# Patient Record
Sex: FEMALE | Race: WHITE | NOT HISPANIC OR LATINO | Employment: UNEMPLOYED | URBAN - METROPOLITAN AREA
[De-identification: names, ages, dates, MRNs, and addresses within clinical notes are randomized per-mention and may not be internally consistent; named-entity substitution may affect disease eponyms.]

---

## 2018-03-05 ENCOUNTER — OFFICE VISIT CONVERTED (OUTPATIENT)
Dept: FAMILY MEDICINE CLINIC | Facility: CLINIC | Age: 68
End: 2018-03-05
Attending: FAMILY MEDICINE

## 2018-03-14 ENCOUNTER — OFFICE VISIT CONVERTED (OUTPATIENT)
Dept: FAMILY MEDICINE CLINIC | Facility: CLINIC | Age: 68
End: 2018-03-14
Attending: FAMILY MEDICINE

## 2018-05-23 ENCOUNTER — OFFICE VISIT CONVERTED (OUTPATIENT)
Dept: NEUROLOGY | Facility: CLINIC | Age: 68
End: 2018-05-23
Attending: PSYCHIATRY & NEUROLOGY

## 2018-05-23 ENCOUNTER — CONVERSION ENCOUNTER (OUTPATIENT)
Dept: NEUROLOGY | Facility: CLINIC | Age: 68
End: 2018-05-23

## 2018-06-07 ENCOUNTER — OFFICE VISIT CONVERTED (OUTPATIENT)
Dept: FAMILY MEDICINE CLINIC | Facility: CLINIC | Age: 68
End: 2018-06-07
Attending: NURSE PRACTITIONER

## 2018-07-06 ENCOUNTER — OFFICE VISIT CONVERTED (OUTPATIENT)
Dept: FAMILY MEDICINE CLINIC | Facility: CLINIC | Age: 68
End: 2018-07-06
Attending: FAMILY MEDICINE

## 2018-09-17 ENCOUNTER — OFFICE VISIT CONVERTED (OUTPATIENT)
Dept: FAMILY MEDICINE CLINIC | Facility: CLINIC | Age: 68
End: 2018-09-17
Attending: FAMILY MEDICINE

## 2018-10-11 ENCOUNTER — CONVERSION ENCOUNTER (OUTPATIENT)
Dept: OTHER | Facility: HOSPITAL | Age: 68
End: 2018-10-11

## 2018-10-19 ENCOUNTER — OFFICE VISIT CONVERTED (OUTPATIENT)
Dept: FAMILY MEDICINE CLINIC | Facility: CLINIC | Age: 68
End: 2018-10-19
Attending: FAMILY MEDICINE

## 2018-10-23 ENCOUNTER — OFFICE VISIT CONVERTED (OUTPATIENT)
Dept: GASTROENTEROLOGY | Facility: CLINIC | Age: 68
End: 2018-10-23
Attending: PHYSICIAN ASSISTANT

## 2018-12-14 ENCOUNTER — OFFICE VISIT CONVERTED (OUTPATIENT)
Dept: FAMILY MEDICINE CLINIC | Facility: CLINIC | Age: 68
End: 2018-12-14
Attending: FAMILY MEDICINE

## 2019-01-29 ENCOUNTER — OFFICE VISIT CONVERTED (OUTPATIENT)
Dept: FAMILY MEDICINE CLINIC | Facility: CLINIC | Age: 69
End: 2019-01-29
Attending: FAMILY MEDICINE

## 2019-01-29 ENCOUNTER — HOSPITAL ENCOUNTER (OUTPATIENT)
Dept: FAMILY MEDICINE CLINIC | Facility: CLINIC | Age: 69
Discharge: HOME | End: 2019-01-29

## 2019-02-02 LAB — CONV DRAINAGE CULTURE: NORMAL

## 2019-04-12 ENCOUNTER — OFFICE VISIT CONVERTED (OUTPATIENT)
Dept: FAMILY MEDICINE CLINIC | Facility: CLINIC | Age: 69
End: 2019-04-12
Attending: FAMILY MEDICINE

## 2019-07-02 ENCOUNTER — OFFICE VISIT CONVERTED (OUTPATIENT)
Dept: FAMILY MEDICINE CLINIC | Facility: CLINIC | Age: 69
End: 2019-07-02
Attending: FAMILY MEDICINE

## 2019-07-30 ENCOUNTER — HOSPITAL ENCOUNTER (OUTPATIENT)
Dept: OTHER | Facility: HOSPITAL | Age: 69
Discharge: HOME | End: 2019-07-30

## 2019-07-30 LAB
ALBUMIN SERPL-MCNC: 4.1 G/DL (ref 3.5–5)
ALBUMIN/GLOB SERPL: 1.5 {RATIO} (ref 1.4–2.6)
ALP SERPL-CCNC: 93 U/L (ref 43–160)
ALT SERPL-CCNC: 10 U/L (ref 10–40)
ANION GAP SERPL CALC-SCNC: 17 MMOL/L (ref 8–19)
AST SERPL-CCNC: 13 U/L (ref 15–50)
BASOPHILS # BLD AUTO: 0.06 10*3/UL (ref 0–0.2)
BASOPHILS NFR BLD AUTO: 0.9 % (ref 0–3)
BILIRUB SERPL-MCNC: 0.27 MG/DL (ref 0.2–1.3)
BUN SERPL-MCNC: 8 MG/DL (ref 5–25)
BUN/CREAT SERPL: 9 {RATIO} (ref 6–20)
CALCIUM SERPL-MCNC: 9.5 MG/DL (ref 8.7–10.4)
CHLORIDE SERPL-SCNC: 96 MMOL/L (ref 99–111)
CHOLEST SERPL-MCNC: 121 MG/DL (ref 107–200)
CHOLEST/HDLC SERPL: 2.4 {RATIO} (ref 3–6)
CONV ABS IMM GRAN: 0.02 10*3/UL (ref 0–0.2)
CONV CO2: 28 MMOL/L (ref 22–32)
CONV IMMATURE GRAN: 0.3 % (ref 0–1.8)
CONV TOTAL PROTEIN: 6.9 G/DL (ref 6.3–8.2)
CREAT UR-MCNC: 0.91 MG/DL (ref 0.5–0.9)
DEPRECATED RDW RBC AUTO: 45.6 FL (ref 36.4–46.3)
EOSINOPHIL # BLD AUTO: 0.19 10*3/UL (ref 0–0.7)
EOSINOPHIL # BLD AUTO: 2.9 % (ref 0–7)
ERYTHROCYTE [DISTWIDTH] IN BLOOD BY AUTOMATED COUNT: 12.6 % (ref 11.7–14.4)
GFR SERPLBLD BASED ON 1.73 SQ M-ARVRAT: >60 ML/MIN/{1.73_M2}
GLOBULIN UR ELPH-MCNC: 2.8 G/DL (ref 2–3.5)
GLUCOSE SERPL-MCNC: 79 MG/DL (ref 65–99)
HBA1C MFR BLD: 14.1 G/DL (ref 12–16)
HCT VFR BLD AUTO: 46 % (ref 37–47)
HDLC SERPL-MCNC: 50 MG/DL (ref 40–60)
LDLC SERPL CALC-MCNC: 51 MG/DL (ref 70–100)
LYMPHOCYTES # BLD AUTO: 2.14 10*3/UL (ref 1–5)
MCH RBC QN AUTO: 29.9 PG (ref 27–31)
MCHC RBC AUTO-ENTMCNC: 30.7 G/DL (ref 33–37)
MCV RBC AUTO: 97.7 FL (ref 81–99)
MONOCYTES # BLD AUTO: 0.65 10*3/UL (ref 0.2–1.2)
MONOCYTES NFR BLD AUTO: 9.8 % (ref 3–10)
NEUTROPHILS # BLD AUTO: 3.6 10*3/UL (ref 2–8)
NEUTROPHILS NFR BLD AUTO: 54 % (ref 30–85)
NRBC CBCN: 0 % (ref 0–0.7)
OSMOLALITY SERPL CALC.SUM OF ELEC: 281 MOSM/KG (ref 273–304)
PLATELET # BLD AUTO: 154 10*3/UL (ref 130–400)
PMV BLD AUTO: 9.8 FL (ref 9.4–12.3)
POTASSIUM SERPL-SCNC: 4.2 MMOL/L (ref 3.5–5.3)
RBC # BLD AUTO: 4.71 10*6/UL (ref 4.2–5.4)
SODIUM SERPL-SCNC: 137 MMOL/L (ref 135–147)
TRIGL SERPL-MCNC: 100 MG/DL (ref 40–150)
VARIANT LYMPHS NFR BLD MANUAL: 32.1 % (ref 20–45)
VLDLC SERPL-MCNC: 20 MG/DL (ref 5–37)
WBC # BLD AUTO: 6.66 10*3/UL (ref 4.8–10.8)

## 2019-09-23 ENCOUNTER — OFFICE VISIT CONVERTED (OUTPATIENT)
Dept: FAMILY MEDICINE CLINIC | Facility: CLINIC | Age: 69
End: 2019-09-23
Attending: FAMILY MEDICINE

## 2019-12-17 ENCOUNTER — OFFICE VISIT CONVERTED (OUTPATIENT)
Dept: FAMILY MEDICINE CLINIC | Facility: CLINIC | Age: 69
End: 2019-12-17
Attending: FAMILY MEDICINE

## 2019-12-17 ENCOUNTER — HOSPITAL ENCOUNTER (OUTPATIENT)
Dept: FAMILY MEDICINE CLINIC | Facility: CLINIC | Age: 69
Discharge: HOME | End: 2019-12-17
Attending: FAMILY MEDICINE

## 2019-12-17 LAB
ALBUMIN SERPL-MCNC: 4.5 G/DL (ref 3.5–5)
ALBUMIN/GLOB SERPL: 1.7 {RATIO} (ref 1.4–2.6)
ALP SERPL-CCNC: 83 U/L (ref 43–160)
ALT SERPL-CCNC: 10 U/L (ref 10–40)
ANION GAP SERPL CALC-SCNC: 19 MMOL/L (ref 8–19)
AST SERPL-CCNC: 17 U/L (ref 15–50)
BASOPHILS # BLD AUTO: 0.06 10*3/UL (ref 0–0.2)
BASOPHILS # BLD: 1 % (ref 0–3)
BASOPHILS NFR BLD AUTO: 0.7 % (ref 0–3)
BILIRUB SERPL-MCNC: 0.35 MG/DL (ref 0.2–1.3)
BUN SERPL-MCNC: 14 MG/DL (ref 5–25)
BUN/CREAT SERPL: 14 {RATIO} (ref 6–20)
BURR CELLS BLD QL SMEAR: SLIGHT
CALCIUM SERPL-MCNC: 9.7 MG/DL (ref 8.7–10.4)
CHLORIDE SERPL-SCNC: 100 MMOL/L (ref 99–111)
CHOLEST SERPL-MCNC: 163 MG/DL (ref 107–200)
CHOLEST/HDLC SERPL: 3.1 {RATIO} (ref 3–6)
CONV ABS BANDS: 1 % (ref 1–5)
CONV ABS IMM GRAN: 0.03 10*3/UL (ref 0–0.2)
CONV ANISOCYTES: SLIGHT
CONV ATYPICAL LYMPHOCYTES: 2 % (ref 0–5)
CONV CO2: 26 MMOL/L (ref 22–32)
CONV CREATININE URINE, RANDOM: 51.7 MG/DL (ref 10–300)
CONV IMMATURE GRAN: 0.3 % (ref 0–1.8)
CONV MICROALBUM.,U,RANDOM: <12 MG/L (ref 0–20)
CONV SEGMENTED NEUTROPHILS: 71 % (ref 45–70)
CONV TOTAL PROTEIN: 7.2 G/DL (ref 6.3–8.2)
CREAT UR-MCNC: 1.02 MG/DL (ref 0.5–0.9)
CRP SERPL-MCNC: 1.7 MG/L (ref 0–5)
DACRYOCYTES BLD QL SMEAR: SLIGHT
DEPRECATED RDW RBC AUTO: 51.5 FL (ref 36.4–46.3)
EOSINOPHIL # BLD AUTO: 0.16 10*3/UL (ref 0–0.7)
EOSINOPHIL # BLD AUTO: 1.8 % (ref 0–7)
EOSINOPHIL NFR BLD AUTO: 1 % (ref 0–7)
ERYTHROCYTE [DISTWIDTH] IN BLOOD BY AUTOMATED COUNT: 13.7 % (ref 11.7–14.4)
ERYTHROCYTE [SEDIMENTATION RATE] IN BLOOD: 5 MM/H (ref 0–30)
EST. AVERAGE GLUCOSE BLD GHB EST-MCNC: 94 MG/DL
GFR SERPLBLD BASED ON 1.73 SQ M-ARVRAT: 56 ML/MIN/{1.73_M2}
GLOBULIN UR ELPH-MCNC: 2.7 G/DL (ref 2–3.5)
GLUCOSE SERPL-MCNC: 88 MG/DL (ref 65–99)
HBA1C MFR BLD: 4.9 % (ref 3.5–5.7)
HCT VFR BLD AUTO: 52.6 % (ref 37–47)
HDLC SERPL-MCNC: 53 MG/DL (ref 40–60)
HGB BLD-MCNC: 16.1 G/DL (ref 12–16)
LDLC SERPL CALC-MCNC: 85 MG/DL (ref 70–100)
LYMPHOCYTES # BLD AUTO: 2.69 10*3/UL (ref 1–5)
LYMPHOCYTES NFR BLD AUTO: 30.8 % (ref 20–45)
MCH RBC QN AUTO: 30.7 PG (ref 27–31)
MCHC RBC AUTO-ENTMCNC: 30.6 G/DL (ref 33–37)
MCV RBC AUTO: 100.2 FL (ref 81–99)
MICROALBUMIN/CREAT UR: 23.2 MG/G{CRE} (ref 0–35)
MONOCYTES # BLD AUTO: 0.63 10*3/UL (ref 0.2–1.2)
MONOCYTES NFR BLD AUTO: 7.2 % (ref 3–10)
MONOCYTES NFR BLD MANUAL: 7 % (ref 3–10)
NEUTROPHILS # BLD AUTO: 5.16 10*3/UL (ref 2–8)
NEUTROPHILS NFR BLD AUTO: 59.2 % (ref 30–85)
NRBC CBCN: 0 % (ref 0–0.7)
NUC CELL # PRT MANUAL: 0 /100{WBCS}
OSMOLALITY SERPL CALC.SUM OF ELEC: 292 MOSM/KG (ref 273–304)
PLAT MORPH BLD: NORMAL
PLATELET # BLD AUTO: 150 10*3/UL (ref 130–400)
PMV BLD AUTO: 9.9 FL (ref 9.4–12.3)
POIKILOCYTOSIS BLD QL SMEAR: SLIGHT
POTASSIUM SERPL-SCNC: 4.4 MMOL/L (ref 3.5–5.3)
RBC # BLD AUTO: 5.25 10*6/UL (ref 4.2–5.4)
ROULEAUX BLD QL SMEAR: SLIGHT
SMALL PLATELETS BLD QL SMEAR: ADEQUATE
SODIUM SERPL-SCNC: 141 MMOL/L (ref 135–147)
SPHEROCYTES BLD QL SMEAR: SLIGHT
T4 FREE SERPL-MCNC: 1.4 NG/DL (ref 0.9–1.8)
TRIGL SERPL-MCNC: 126 MG/DL (ref 40–150)
TSH SERPL-ACNC: 1.37 M[IU]/L (ref 0.27–4.2)
VARIANT LYMPHS NFR BLD MANUAL: 17 % (ref 20–45)
VLDLC SERPL-MCNC: 25 MG/DL (ref 5–37)
WBC # BLD AUTO: 8.73 10*3/UL (ref 4.8–10.8)

## 2019-12-18 LAB — HCV AB SER DONR QL: <0.1 S/CO RATIO (ref 0–0.9)

## 2020-01-08 ENCOUNTER — HOSPITAL ENCOUNTER (OUTPATIENT)
Dept: CARDIOLOGY | Facility: HOSPITAL | Age: 70
Discharge: HOME | End: 2020-01-08
Attending: FAMILY MEDICINE

## 2020-01-10 ENCOUNTER — CONVERSION ENCOUNTER (OUTPATIENT)
Dept: FAMILY MEDICINE CLINIC | Facility: CLINIC | Age: 70
End: 2020-01-10

## 2020-01-10 ENCOUNTER — OFFICE VISIT CONVERTED (OUTPATIENT)
Dept: FAMILY MEDICINE CLINIC | Facility: CLINIC | Age: 70
End: 2020-01-10
Attending: FAMILY MEDICINE

## 2020-04-27 ENCOUNTER — TELEPHONE CONVERTED (OUTPATIENT)
Dept: FAMILY MEDICINE CLINIC | Facility: CLINIC | Age: 70
End: 2020-04-27
Attending: FAMILY MEDICINE

## 2020-11-10 ENCOUNTER — TELEPHONE CONVERTED (OUTPATIENT)
Dept: FAMILY MEDICINE CLINIC | Facility: CLINIC | Age: 70
End: 2020-11-10
Attending: FAMILY MEDICINE

## 2021-01-13 ENCOUNTER — HOSPITAL ENCOUNTER (OUTPATIENT)
Dept: FAMILY MEDICINE CLINIC | Facility: CLINIC | Age: 71
Discharge: HOME | End: 2021-01-13
Attending: FAMILY MEDICINE

## 2021-01-13 ENCOUNTER — OFFICE VISIT CONVERTED (OUTPATIENT)
Dept: FAMILY MEDICINE CLINIC | Facility: CLINIC | Age: 71
End: 2021-01-13
Attending: FAMILY MEDICINE

## 2021-01-13 LAB
ALBUMIN SERPL-MCNC: 4.3 G/DL (ref 3.5–5)
ALBUMIN/GLOB SERPL: 1.3 {RATIO} (ref 1.4–2.6)
ALP SERPL-CCNC: 92 U/L (ref 43–160)
ALT SERPL-CCNC: 7 U/L (ref 10–40)
ANION GAP SERPL CALC-SCNC: 15 MMOL/L (ref 8–19)
AST SERPL-CCNC: 15 U/L (ref 15–50)
BASOPHILS # BLD AUTO: 0.05 10*3/UL (ref 0–0.2)
BASOPHILS # BLD: 1 % (ref 0–3)
BASOPHILS NFR BLD AUTO: 0.6 % (ref 0–3)
BILIRUB SERPL-MCNC: 0.28 MG/DL (ref 0.2–1.3)
BUN SERPL-MCNC: 15 MG/DL (ref 5–25)
BUN/CREAT SERPL: 13 {RATIO} (ref 6–20)
BURR CELLS BLD QL SMEAR: SLIGHT
CALCIUM SERPL-MCNC: 10.2 MG/DL (ref 8.7–10.4)
CHLORIDE SERPL-SCNC: 102 MMOL/L (ref 99–111)
CHOLEST SERPL-MCNC: 177 MG/DL (ref 107–200)
CHOLEST/HDLC SERPL: 3.6 {RATIO} (ref 3–6)
CLUMPED PLATELETS: ABNORMAL
CONV ABS BANDS: 0 % (ref 1–5)
CONV ABS IMM GRAN: 0.04 10*3/UL (ref 0–0.2)
CONV ANISOCYTES: ABNORMAL
CONV ATYPICAL LYMPHOCYTES: 6 % (ref 0–5)
CONV CO2: 29 MMOL/L (ref 22–32)
CONV IMMATURE GRAN: 0.5 % (ref 0–1.8)
CONV SEGMENTED NEUTROPHILS: 58 % (ref 45–70)
CONV TOTAL PROTEIN: 7.6 G/DL (ref 6.3–8.2)
CREAT UR-MCNC: 1.17 MG/DL (ref 0.5–0.9)
DACRYOCYTES BLD QL SMEAR: SLIGHT
DEPRECATED RDW RBC AUTO: 51 FL (ref 36.4–46.3)
EOSINOPHIL # BLD AUTO: 0.17 10*3/UL (ref 0–0.7)
EOSINOPHIL # BLD AUTO: 2 % (ref 0–7)
EOSINOPHIL NFR BLD AUTO: 3 % (ref 0–7)
ERYTHROCYTE [DISTWIDTH] IN BLOOD BY AUTOMATED COUNT: 14.1 % (ref 11.7–14.4)
EST. AVERAGE GLUCOSE BLD GHB EST-MCNC: 108 MG/DL
GFR SERPLBLD BASED ON 1.73 SQ M-ARVRAT: 47 ML/MIN/{1.73_M2}
GIANT PLATELETS: ABNORMAL
GLOBULIN UR ELPH-MCNC: 3.3 G/DL (ref 2–3.5)
GLUCOSE SERPL-MCNC: 80 MG/DL (ref 65–99)
HBA1C MFR BLD: 5.4 % (ref 3.5–5.7)
HCT VFR BLD AUTO: 52.3 % (ref 37–47)
HDLC SERPL-MCNC: 49 MG/DL (ref 40–60)
HGB BLD-MCNC: 16.8 G/DL (ref 12–16)
HYPOGRANULAR PLATELETS: SLIGHT
LARGE PLATELETS: SLIGHT
LDLC SERPL CALC-MCNC: 100 MG/DL (ref 70–100)
LYMPHOCYTES # BLD AUTO: 2.16 10*3/UL (ref 1–5)
LYMPHOCYTES NFR BLD AUTO: 25.5 % (ref 20–45)
MACROCYTES BLD QL SMEAR: ABNORMAL
MCH RBC QN AUTO: 31.3 PG (ref 27–31)
MCHC RBC AUTO-ENTMCNC: 32.1 G/DL (ref 33–37)
MCV RBC AUTO: 97.4 FL (ref 81–99)
MONOCYTES # BLD AUTO: 0.67 10*3/UL (ref 0.2–1.2)
MONOCYTES NFR BLD AUTO: 7.9 % (ref 3–10)
MONOCYTES NFR BLD MANUAL: 10 % (ref 3–10)
NEUTROPHILS # BLD AUTO: 5.37 10*3/UL (ref 2–8)
NEUTROPHILS NFR BLD AUTO: 63.5 % (ref 30–85)
NRBC CBCN: 0 % (ref 0–0.7)
NUC CELL # PRT MANUAL: 0 /100{WBCS}
OSMOLALITY SERPL CALC.SUM OF ELEC: 292 MOSM/KG (ref 273–304)
OVALOCYTES BLD QL SMEAR: SLIGHT
PLASMA CELL PREC NFR BLD MANUAL: 1 %
PLAT MORPH BLD: NORMAL
PLATELET # BLD AUTO: 150 10*3/UL (ref 130–400)
PMV BLD AUTO: 10 FL (ref 9.4–12.3)
POIKILOCYTOSIS BLD QL SMEAR: SLIGHT
POTASSIUM SERPL-SCNC: 5 MMOL/L (ref 3.5–5.3)
RBC # BLD AUTO: 5.37 10*6/UL (ref 4.2–5.4)
SMALL PLATELETS BLD QL SMEAR: ADEQUATE
SODIUM SERPL-SCNC: 141 MMOL/L (ref 135–147)
T4 FREE SERPL-MCNC: 1.3 NG/DL (ref 0.9–1.8)
TRIGL SERPL-MCNC: 140 MG/DL (ref 40–150)
TSH SERPL-ACNC: 2.04 M[IU]/L (ref 0.27–4.2)
VARIANT LYMPHS NFR BLD MANUAL: 21 % (ref 20–45)
VLDLC SERPL-MCNC: 28 MG/DL (ref 5–37)
WBC # BLD AUTO: 8.46 10*3/UL (ref 4.8–10.8)

## 2021-05-07 NOTE — PROGRESS NOTES
Progress Note      Patient Name: Bessy Leggett   Patient ID: [id number]   Sex: Female   YOB: 1950    Primary Care Provider: Traci Jensen MD   Referring Provider: Traci Jensen MD    Visit Date: November 10, 2020    Provider: Erich Joya DO   Location: [facility]   Location Address: [address] [place] [address] [place]  [address]   Location Phone: [phone]          Chief Complaint     RX REFILLS       History Of Present Illness  TELEHEALTH TELEPHONE VISIT  Chief Complaint: RX REFILLS   Bessy Leggett is a 70 year old /White female who is presenting for evaluation via telehealth telephone visit conducted from the [facility] in [place], [place]. Verbal consent obtained before beginning visit. No other person present with the patient during the visit.   Provider spent 5:10 minutes with patient during telehealth visit.   The following staff were present during this visit: Erich Joya DO   Past Medical History/Overview of Patient Symptoms     *The patient presents with a past medical history significant for NIDDM, COPD and seizures. She is requesting refills of all of her medications. She presents with no significant complaints today. She is due for labs.*       Past Medical History  Disease Name Date Onset Notes   Arthritis --  --    Asthma --  --    Cataract --  --    COPD (chronic obstructive pulmonary disease) --  --    Hyperlipidemia --  --    Hypertension --  --    IBS (irritable bowel syndrome) --  --    Non-insulin dependent type 2 diabetes mellitus --  --    Reflux --  --    Rheumatoid arthritis --  --    Seizure --  --          Past Surgical History  Procedure Name Date Notes   Colonoscopy and EGD, with anesthesia 11-02-18 --    Gallbladder --  --    Hysterectomy --  --          Medication List  Name Date Started Instructions   amlodipine 10 mg oral tablet 07/20/2020 TAKE 1 TABLET BY MOUTH EVERY DAY   Anoro Ellipta 62.5-25  mcg/actuation inhalation blister with device 06/07/2020 inhale 1 puff by inhalation route once daily at the same time each day for 30 days   Flovent HFA 44 mcg/actuation inhalation HFA aerosol inhaler 09/28/2020 INHALE 2 PUFFS(88 MCG) BY MOUTH TWICE DAILY   hydroxyzine pamoate 50 mg oral capsule 09/28/2020 TAKE 1 CAPSULE BY MOUTH TWICE DAILY AS NEEDED FOR ANXIETY   Keppra 500 mg oral tablet 04/27/2020 take 1 tablet (500 mg) by oral route 2 times per day for 30 days   levalbuterol HCl 0.31 mg/3 mL inhalation solution for nebulization 12/17/2019 use in nebulizer as directed 3 times a day for 30 days   lisinopril 20 mg oral tablet 08/11/2020 TAKE 1 TABLET BY MOUTH ONCE DAILY   mirtazapine 30 mg oral tablet 09/28/2020 TAKE 1 TABLET BY MOUTH EVERY DAY AT BEDTIME   omeprazole 20 mg oral capsule,delayed release(DR/EC) 07/27/2020 TAKE 1 CAPSULE BY MOUTH EVERY DAY   pravastatin 40 mg oral tablet 07/20/2020 TAKE 1 TABLET BY MOUTH AT BEDTIME   Remeron 30 mg oral tablet 04/27/2020 take 1 tablet (30 mg) by oral route once daily before bedtime for 30 days   Ventolin HFA 90 mcg/actuation inhalation HFA aerosol inhaler 04/27/2020 INHALE 1 TO 2 PUFFS BY MOUTH AND INTO THE LUNGS EVERY 4 TO 6 HOURS IF NEEDED         Allergy List  Allergen Name Date Reaction Notes   BuSpar --  --  --    Celexa --  --  --    Darvocet-N 100 --  --  --    Elavil --  --  --    ibuprofen --  --  --    Imitrex --  --  --    Paxil --  --  --    SULFA (SULFONAMIDES) --  --  --    tramadol --  --  --    Tylenol-Codeine #3 --  --  --    Vioxx --  --  --    Zoloft --  --  --          Family Medical History  Disease Name Relative/Age Notes   Stroke  --    Heart Disease  --    Hypertension  --    Cancer, Unspecified  --    Diabetes  --          Social History  Finding Status Start/Stop Quantity Notes   Alcohol Never --/-- --  03/14/2018 -    Tobacco Former --/-- --  FORMER SMOKER         Immunizations  NameDate Admin Mfg Trade Name Lot Number Route Inj VIS Given  VIS Publication   Fkaxsirxu13/17/2019 [facility] FLUZONE-HIGH DOSE [id number] IM LD 12/17/2019    Comments: [id number]         Review of Systems  · Constitutional  o Denies  o : fatigue, fever  · Eyes  o Denies  o : discharge from eye, redness  · HENT  o Denies  o : headaches, congestion  · Cardiovascular  o Denies  o : chest pain, palpitations  · Respiratory  o Denies  o : shortness of breath, wheezing, cough  · Gastrointestinal  o Denies  o : vomiting, diarrhea, constipation  · Genitourinary  o Denies  o : dysuria, hematuria  · Integument  o Denies  o : rash, new skin lesions  · Neurologic  o Denies  o : altered mental status, seizures  · Musculoskeletal  o Denies  o : weakness, joint swelling  · Psychiatric  o Denies  o : anxiety, depression  · Heme-Lymph  o Denies  o : lymph node enlargement, tenderness          Assessment  · COPD (chronic obstructive pulmonary disease)     496/J44.9    A.) Rx refilled as noted.     · Seizure disorder     345.90/G40.909    A.) Rx refilled as noted.        *Chronic rx refilled as noted. The patient has been advised that she will need to follow up in office in 3 months, as she will be well overdue for labs at that time.*       Plan  · Orders  o ACO-39: Current medications updated and reviewed (1159F, , 1159F, ) - - 11/10/2020  o Physican Telephone evaluation, 5-10 min (98837) - 496/J44.9, 345.90/G40.909 - 11/10/2020  · Medications  o levalbuterol HCl 0.31 mg/3 mL inhalation solution for nebulization   SIG: Use 3 ml via nebulizer up to three times a day PRN   DISP: (10) Milliliter with 2 refills  Adjusted on 11/10/2020     o omeprazole 20 mg oral capsule,delayed release(DR/EC)   SIG: TAKE 1 CAPSULE BY MOUTH EVERY DAY PRN   DISP: (90) Capsule with 0 refills  Adjusted on 11/10/2020     o amlodipine 10 mg oral tablet   SIG: TAKE 1 TABLET BY MOUTH EVERY DAY   DISP: (90) Tablet with 0 refills  Refilled on 11/10/2020     o Anoro Ellipta 62.5-25 mcg/actuation inhalation blister with  device   SIG: inhale 1 puff by inhalation route once daily at the same time each day for 30 days   DISP: (60) Blister with 2 refills  Refilled on 11/10/2020     o Flovent HFA 44 mcg/actuation inhalation HFA aerosol inhaler   SIG: INHALE 2 PUFFS(88 MCG) BY MOUTH TWICE DAILY   DISP: (10.6) Gram with 1 refills  Refilled on 11/10/2020     o hydroxyzine pamoate 50 mg oral capsule   SIG: TAKE 1 CAPSULE BY MOUTH TWICE DAILY AS NEEDED FOR ANXIETY for 90 days   DISP: (180) Capsule with 0 refills  Refilled on 11/10/2020     o Keppra 500 mg oral tablet   SIG: take 1 tablet (500 mg) by oral route 2 times per day for 30 days   DISP: (60) Tablet with 2 refills  Refilled on 11/10/2020     o lisinopril 20 mg oral tablet   SIG: TAKE 1 TABLET BY MOUTH ONCE DAILY   DISP: (90) Tablet with 0 refills  Refilled on 11/10/2020     o mirtazapine 30 mg oral tablet   SIG: TAKE 1 TABLET BY MOUTH EVERY DAY AT BEDTIME   DISP: (30) Tablet with 2 refills  Refilled on 11/10/2020     o pravastatin 40 mg oral tablet   SIG: TAKE 1 TABLET BY MOUTH AT BEDTIME   DISP: (90) Tablet with 0 refills  Refilled on 11/10/2020     o Remeron 30 mg oral tablet   SIG: take 1 tablet (30 mg) by oral route once daily before bedtime for 30 days   DISP: (30) Tablet with 2 refills  Refilled on 11/10/2020     o Ventolin HFA 90 mcg/actuation inhalation HFA aerosol inhaler   SIG: INHALE 1 TO 2 PUFFS BY MOUTH AND INTO THE LUNGS EVERY 4 TO 6 HOURS IF NEEDED   DISP: (18) Gram with 2 refills  Refilled on 11/10/2020     · Instructions  o Plan Of Care: See assessment section.  o Take all medications as prescribed/directed.  o FOLLOW UP IN OFFICE IN 3 MONTHS.            Electronically Signed by: Erich Joya DO - on November 10, 2020 02:26:28 PM

## 2021-05-07 NOTE — PROGRESS NOTES
Progress Note      Patient Name: Bessy Leggett   Patient ID: [id number]   Sex: Female   YOB: 1950    Primary Care Provider: Traci Jensen MD   Referring Provider: Traci Jensen MD    Visit Date: July 2, 2019    Provider: Traci Jensen MD   Location: [facility]   Location Address: [address] [place] [address] [place]  [id number]   Location Phone: [phone]          Chief Complaint     has been hurting all over, medication check up.       History Of Present Illness  Bessy Leggett is a 69 year old /White female who presents for evaluation and treatment of:      She had her surgery on the abdominal aortic aneurysm and she is still sore from it.  She has beenhurting in her back and neck and arms.  She has been taking tylenol .  She had to have surgery on the RLE because of a clot after surgery .  She has fallen and hit the door frame.  She has nearly fallen several times.  She says the Xanax isn't working anymore.  She has been saving 2 for bedtime and she still isn't sleeping.  She feels like she is suffocating and has hot spells. She is anxious over her daughter whose cancer has returned.  She reiterates that the neurologist said she could never wean off the Xanax and they ask if there is something stronger.  She only has tylenol and aspirin for pain.  She has had a bleed with ASA in the past  Sometimes she has constipation.  That is only if she takes a certain medication  She has continued to have a spell in which she threw up.  She thinks it is because she had eaten and she didn't feel like eating.  Sometimes it feels like there is something sharp when her bowels move.       Past Medical History  Disease Name Date Onset Notes   Arthritis --  --    Asthma --  --    Cataract --  --    COPD (chronic obstructive pulmonary disease) --  --    Diabetes --  --    Hyperlipidemia --  --    Hypertension --  --    IBS (irritable bowel syndrome) --  --    Reflux --   --    Rheumatoid arthritis --  --    Seizure --  --          Past Surgical History  Procedure Name Date Notes   Gallbladder --  --    Hysterectomy --  --          Medication List  Name Date Started Instructions   amlodipine 10 mg oral tablet 01/15/2019 take 1 tablet by mouth once daily   Anoro Ellipta 62.5-25 mcg/actuation inhalation blister with device 06/14/2019 inhale 1 puff by inhalation route once daily at the same time each day for 30 days   hydrocodone-acetaminophen 5-325 mg oral tablet 06/14/2019 take 1 tablet by oral route daily for 10 days prn   Keppra 500 mg oral tablet 06/14/2019 take 1 tablet (500 mg) by oral route 2 times per day for 30 days   levalbuterol HCl 0.31 mg/3 mL inhalation solution for nebulization 12/12/2017 use in nebulizer as directed 3 times a day for 30 days   lisinopril 20 mg oral tablet 06/14/2019 take 1 tablet (20 mg) by oral route once daily for 30 days   omeprazole 20 mg oral capsule,delayed release(/EC) 06/14/2019 take 1 capsule by mouth once daily   pravastatin 40 mg oral tablet 06/14/2019 take 1 tablet by mouth at bedtime   Ventolin HFA 90 mcg/actuation inhalation HFA aerosol inhaler 06/14/2019 inhale 1 - 2 puffs (90 - 180 mcg) by inhalation route every 4-6 hours as needed for 30 days   Vistaril 25 mg oral capsule 06/14/2019 take 1 capsule (25 mg) by oral route 3 times per day for 30 days   Xanax 0.5 mg oral tablet 06/14/2019 take 1 tablet (0.5 mg) by oral route 3 times per day for 30 days         Allergy List  Allergen Name Date Reaction Notes   BuSpar --  --  --    Celexa --  --  --    Darvocet-N 100 --  --  --    Elavil --  --  --    ibuprofen --  --  --    Imitrex --  --  --    Paxil --  --  --    SULFA (SULFONAMIDES) --  --  --    tramadol --  --  --    Tylenol-Codeine #3 --  --  --    Vioxx --  --  --    Zoloft --  --  --          Family Medical History  Disease Name Relative/Age Notes   Stroke  --    Heart Disease  --    Hypertension  --    Cancer, Unspecified  --     Diabetes  --          Social History  Finding Status Start/Stop Quantity Notes   Alcohol Never --/-- --  03/14/2018 -    Former smoker --  --/-- --  03/14/2018 -    Tobacco Former --/-- --  FORMER SMOKER         Immunizations  NameDate Admin Mfg Trade Name Lot Number Route Inj VIS Given VIS Publication   Ibetqynsr03/17/2018 [facility] FLUZONE-HIGH DOSE [id number]  09/17/2018 08/07/2015   Comments: ndc [id number]         Vitals  Date Time BP Position Site L\R Cuff Size HR RR TEMP (F) WT  HT  BMI kg/m2 BSA m2 O2 Sat HC       07/02/2019 01:52 /84 Sitting    114 - R  98.8 105lbs 2oz    95 %          Physical Examination  · Constitutional  o Appearance  o : chronically ill and somewhat anxious   · Head and Face  o HEENT  o : Lips are dry  · Eyes  o Conjunctivae  o : conjunctivae normal  · Neck  o Inspection/Palpation  o : supple  o Thyroid  o : no thyromegaly  · Respiratory  o Respiratory Effort  o : breathing unlabored  o Auscultation of Lungs  o : clear to ascultation  · Cardiovascular  o Heart  o :   § Auscultation of Heart  § : mild tachycardia  o Peripheral Vascular System  o :   § Extremities  § : no edema  · Lymphatic  o Neck  o : no lymphadenopathy present  · Musculoskeletal  o General  o :   § General Musculoskeletal  § : tremulous kind of all over  · Skin and Subcutaneous Tissue  o General Inspection  o : She has great looking wounds on both groins.  · Neurologic  o Gait and Station  o :   § Gait Screening  § : normal gait  · Psychiatric  o Mood and Affect  o : mood normal, affect appropriate          Assessment  · Anxiety disorder     300.00/F41.9  · Essential hypertension     401.9/I10  · GERD (gastroesophageal reflux disease)     530.81/K21.9  · Hyperlipidemia     272.4/E78.5  · Hypertension     401.9/I10      Plan  · Orders  o CBC with Auto Diff [facility] ([id number]) - 401.9/I10 - 07/02/2019  o CMP [facility] ([id number]) - 401.9/I10, 272.4/E78.5 - 07/02/2019  o Lipid Panel [facility] ([id number]) - 272.4/E78.5 - 07/02/2019  o ACO-39:  Current medications updated and reviewed () - - 07/02/2019  · Medications  o Remeron 15 mg oral tablet   SIG: take 1 tablet (15 mg) by oral route once daily before bedtime for 30 days   DISP: (30) tablets with 3 refills  Prescribed on 07/02/2019     o amlodipine 10 mg oral tablet   SIG: take 1 tablet by mouth once daily   DISP: (90) Tablet with 1 refills  Adjusted on 07/02/2019     o Anoro Ellipta 62.5-25 mcg/actuation inhalation blister with device   SIG: inhale 1 puff by inhalation route once daily at the same time each day for 30 days   DISP: (60) Blister with 5 refills  Adjusted on 07/02/2019     o Keppra 500 mg oral tablet   SIG: take 1 tablet (500 mg) by oral route 2 times per day for 30 days   DISP: (60) tablets with 5 refills  Adjusted on 07/02/2019     o levalbuterol HCl 0.31 mg/3 mL inhalation solution for nebulization   SIG: use in nebulizer as directed 3 times a day for 30 days   DISP: (10) 3 ml vial with 5 refills  Adjusted on 07/02/2019     o lisinopril 20 mg oral tablet   SIG: take 1 tablet (20 mg) by oral route once daily for 90 days   DISP: (90) tablets with 1 refills  Adjusted on 07/02/2019     o omeprazole 20 mg oral capsule,delayed release(DR/EC)   SIG: take 1 capsule by mouth once daily   DISP: (90) Capsule with 1 refills  Adjusted on 07/02/2019     o pravastatin 40 mg oral tablet   SIG: take 1 tablet by mouth at bedtime   DISP: (90) Tablet with 1 refills  Adjusted on 07/02/2019     o Ventolin HFA 90 mcg/actuation inhalation HFA aerosol inhaler   SIG: inhale 1 - 2 puffs (90 - 180 mcg) by inhalation route every 4-6 hours as needed for 30 days   DISP: (18) Gram with 5 refills  Adjusted on 07/02/2019     o Xanax 0.5 mg oral tablet   SIG: take 1 tablet (0.5 mg) by oral route 3 times per day for 30 days   DISP: (90) tablets with 2 refills  Adjusted on 07/02/2019     · Instructions  o Advised that cheeses and other sources of dairy fats, animal fats, fast food, and the extras (candy, pasteries,  pies, doughnuts and cookies) all contain LDL raising nutrients. Advised to increase fruits, vegetables, whole grains, and to monitor portion sizes.   o Patient was educated/instructed on their diagnosis, treatment and medications prior to discharge from the clinic today.            Electronically Signed by: Traci Jensen MD -Author on July 2, 2019 02:29:53 PM

## 2021-05-07 NOTE — PROGRESS NOTES
Progress Note      Patient Name: Bessy Leggett   Patient ID: [id number]   Sex: Female   YOB: 1950    Primary Care Provider: Traci Jensen MD   Referring Provider: Traci Jensen MD    Visit Date: December 14, 2018    Provider: Traci Jensen MD   Location: [facility]   Location Address: [address] [place] [address]  [id number]   Location Phone: [phone]          Chief Complaint  · Follow up office visit within 7 calender days of discharge from inpatient status (high complexity).      History Of Present Illness  FOLLOW UP OFFICE VISIT WITHIN 7 CALENDER DAYS OF INPATIENT STATUS (SEVERE COMPLEXITY)  Bessy Leggett presents to office for follow up post discharge from inpatient status within 7 calender days. Patient was contacted within 2 business days via phone conversation. Documentation of that phone contact is present in the patient's electronic chart. Patient was admitted to an inpatient faciliity on 12/03/2018 and discharged on 12/08/2018 due to: Encephalopathy   Admitting MD: Dr. Oliveros   Her discharge summary has been reviewed and placed in the patient's electronic chart.   Patient's problem list is: Diabetes, Hyperlipidemia, Hypertension, and encephalopathy, Seizure disorder, weight loss without trying, abdominal aortic aneurysm COPD, tobacco abuse   Patient's medication list has been updated/reconcilled from discharge summary. Medication list is: MEDICATION LIST   Bessy Leggett is a 68 year old /White female who presents for evaluation and treatment of:      She has a long history of agoraphobia with worsening over the years.  She has been on Ativan or Valium or Xanax over the years.  She has never remained on any antidepressants long enough for them to do any good.  She has always reported side effects with any medication that would treat her anxiety/agoraphobia.  I have never thought that she had auditory hallucinations in the past.  She has often thought that people were giving her medications or taking her medications.  I suspected that this might be true.  Mignon, her grand daughter in law checks on her.  She is an CADY.  She has been helping with keeping her meds.  She alleges that her brother came and gave her some extra medication.  She states that the Xanax runs out too soon on the 5 mg TID.  I think medication is her problem and have been trying to wean her for sometime.  She says that she still has voices that she hears that are talking about her.  This time her daughter says her symptoms began when she had her colonoscopy.  She says she woke during the colonoscopy.    The urine C&S was negative this time.  Psychiatry is thinking that she has auditory hallucinations.  She definitely has had paranoid thoughts.  They stopped the Mirtazaine because she had blurred visionn and felt hot but she has continued the Trazadone 50 mg qhs.  She would like to go back to Dr. Couch since Dr. Cardona said that her case is too difficult.  She has an abdominal aortic aneurysm 5.2 x 5.0 x 8.6 infrarenal.  She has an appointment on 12/17/18  She no longer has oxygen.  While she was in the hospital her oxygen was good.  She thinks her oxygen drops at nighttime.  That is when she is SOB.  We will moniter her at home and see if she drops at night.       Past Medical History  Disease Name Date Onset Notes   Arthritis --  --    Asthma --  --    Cataract --  --    COPD (chronic obstructive pulmonary disease) --  --    Diabetes --  --    Hyperlipidemia --  --    Hypertension --  --    IBS (irritable bowel syndrome) --  --    Reflux --  --    Rheumatoid arthritis --  --    Seizure --  --          Past Surgical History  Procedure Name Date Notes   Gallbladder --  --    Hysterectomy --  --          Medication List  Name Date Started Instructions   amlodipine 10 mg oral tablet 07/20/2018 take 1 tablet by mouth once daily   Anoro Ellipta 62.5-25 mcg/actuation inhalation  blister with device 12/11/2018 inhale 1 puff by inhalation route once daily at the same time each day for 30 days   Ativan 0.5 mg oral tablet 09/17/2018 take 1 tablet (0.5 mg) by oral route 3 times per day as needed for 30 days   hydrochlorothiazide 12.5 mg oral capsule 11/13/2018 take 1 capsule by mouth once daily   Keppra 500 mg oral tablet 03/14/2018 take 1 tablet (500 mg) by oral route 2 times per day for 30 days   levalbuterol HCl 0.31 mg/3 mL inhalation solution for nebulization 12/12/2017 use in nebulizer as directed 3 times a day for 30 days   lisinopril 40 mg oral tablet 11/13/2018 take 1 tablet by mouth once daily   omeprazole 20 mg oral capsule,delayed release(DR/EC) 12/04/2018 take 1 capsule by mouth once daily   omeprazole 40 mg oral capsule,delayed release(DR/EC) 06/18/2018 take 1 capsule by oral route daily for 30 days   pravastatin 40 mg oral tablet 07/24/2018 take 1 tablet by mouth at bedtime   Ventolin HFA 90 mcg/actuation inhalation HFA aerosol inhaler 03/28/2018 inhale 1 - 2 puffs (90 - 180 mcg) by inhalation route every 4-6 hours as needed for 30 days   Viibryd 10 mg oral tablet 12/12/2017 take 1 tablet by oral route daily for 30 days   Vitamin B-12 1,000 mcg/mL injection solution 03/14/2018 inject 1 milliliter (1,000 mcg) by subcutaneous route once a month for 30 days   Xanax 0.5 mg oral tablet 12/11/2018 take 1 tablet (0.5 mg) by oral route 3 times per day for 10 days   Zofran ODT 4 mg oral tablet,disintegrating 03/14/2018 dissolve 1 tablet by oral route 3 times a day for 7 days         Allergy List  Allergen Name Date Reaction Notes   BuSpar --  --  --    Celexa --  --  --    Darvocet-N 100 --  --  --    Elavil --  --  --    ibuprofen --  --  --    Imitrex --  --  --    Paxil --  --  --    SULFA (SULFONAMIDES) --  --  --    tramadol --  --  --    Tylenol-Codeine #3 --  --  --    Vioxx --  --  --    Zoloft --  --  --          Family Medical History  Disease Name Relative/Age Notes   Cancer,  Unspecified / --    Diabetes / --    Heart Disease / --    Hypertension / --    Stroke / --          Social History  Finding Status Start/Stop Quantity Notes   Alcohol Never --/-- --  03/14/2018 -    Former smoker --  --/-- --  03/14/2018 -    Tobacco Former --/-- --  FORMER SMOKER         Immunizations  NameDate Admin Mfg Trade Name Lot Number Route Inj VIS Given VIS Publication   [id number]/17/2018 PMC Fluzone High-Dose [id number] IM  09/17/2018 08/07/2015   Comments: ndc [id number]         Review of Systems  · Constitutional  o Denies  o : fever, weight gain, weight loss, malaise/fatigue  · Cardiovascular  o Denies  o : palpitation, chest pain, claudication, lower extremity edema  · Respiratory  o Denies  o : shortness of breath, hemoptysis, wheezing, productive cough  · Gastrointestinal  o Denies  o : nausea, vomiting, diarrhea, constipation, abdominal pain  · Neurologic  o Denies  o : unsteady gait, weakness, dizziness, H/A      Vitals  Date Time BP Position Site L\R Cuff Size HR RR TEMP(F) WT  HT  BMI kg/m2 BSA m2 O2 Sat        12/14/2018 08:46 /68 Sitting    104 - R  97.8 104lbs 0oz    94 %           Physical Examination  · Constitutional  o Appearance  o : well developed, well-nourished, in no acute distress She is still a little unsure of her history Her daughter is with her today  · Eyes  o Conjunctivae  o : conjunctivae normal  · Neck  o Inspection/Palpation  o : supple  o Thyroid  o : no thyromegaly  · Respiratory  o Respiratory Effort  o : breathing unlabored  o Auscultation of Lungs  o : clear to ascultation  · Cardiovascular  o Heart  o :   § Auscultation of Heart  § : regular rate and rhythm  o Peripheral Vascular System  o :   § Extremities  § : no edema  · Gastrointestinal  o Abdominal Examination  o :   § Abdomen  § : diffuse tenderness  · Lymphatic  o Neck  o : no lymphadenopathy present  · Musculoskeletal  o General  o :   § General Musculoskeletal  § : generalized weakness  · Skin and  Subcutaneous Tissue  o General Inspection  o : normal  · Neurologic  o Gait and Station  o :   § Gait Screening  § : normal gait  · Psychiatric  o Mood and Affect  o : mood normal, affect appropriate still sligihtly anxious           Assessment  · Anxiety disorder     300.00/F41.9  · COPD (chronic obstructive pulmonary disease)     496/J44.9  · Essential hypertension     401.9/I10  · Major depressive disorder     296.20/F32.2  · Abdominal aortic aneurysm     441.4/I71.4  · Agoraphobia with panic attacks     300.21/F40.01  · Paranoia     297.1/F22  · Weight loss, non-intentional     783.21/R63.4      Plan  · Orders  o Discharge medications reconciled with the current medication list (1111F) - - 12/14/2018  o ACO-39: Current medications updated and reviewed () - - 12/14/2018  o VASCULAR SURGERY CONSULTATION ([facility]) - 441.4/I71.4 - 12/14/2018   either needs Dr. Campos or Dr. Couch She prefers [facility] this is an infrarenal aortic aneurysm 5.2x 5.0 x 8.6 infrarenal  o Home Health Consultation ([facility]) - - 12/14/2018   She has COPD and thinks her oxygen drops at night. Please due overnight oximetry  · Medications  o lisinopril 20 mg oral tablet   SIG: take 1 tablet (20 mg) by oral route once daily for 30 days   DISP: (30) tablets with 5 refills  Adjusted on 12/14/2018     o Ativan 0.5 mg oral tablet   SIG: take 1 tablet (0.5 mg) by oral route 3 times per day as needed for 30 days   DISP: (90) tablets with 3 refills  Discontinued on 12/14/2018     o Viibryd 10 mg oral tablet   SIG: take 1 tablet by oral route daily for 30 days   DISP: (30) Bottle with 5 refills  Discontinued on 12/14/2018     · Instructions  o Patient discharge summary has been reviewed and placed in patient's electronic medical record.  o Patient received a phone call from my office within 2 business days of discharge from inpatient status, and documented within the patient's chart.  o Also patient was seen (face to face) for follow up evaluation within  7 calender days of discharge from inpatient status for a high complexity issue.  o Patient was educated on their diagnosis, treatment and any medication changes while being evaluated today.  o Patient was educated/instructed on their diagnosis, treatment and medications prior to discharge from the clinic today.            Electronically Signed by: Traci Jensen MD -Author on December 14, 2018 11:33:39 AM

## 2021-05-07 NOTE — PROGRESS NOTES
Progress Note      Patient Name: Bessy Leggett   Patient ID: [id number]   Sex: Female   YOB: 1950    Primary Care Provider: Traci Jensen MD   Referring Provider: Traci Jensen MD    Visit Date: September 23, 2019    Provider: Traci Jensen MD   Location: [facility]   Location Address: [address] [place] [address] [place]  [id number]   Location Phone: [phone]          Chief Complaint     refill xanax and Remron       History Of Present Illness  Bessy Leggett is a 69 year old /White female who presents for evaluation and treatment of:      She is here for refills.  Her weight has remained stable.  She says she craves sweets more than she used to crave.  She has not been to the podiatrist.  Her daughter has another round of chemo and then will have surgery.    The pill at bedtime doesn't keep her asleep .  She still wakes through the night.    She woke up one night and had a dream.  She is doing everything for herself.  At times everything nearly blacks out and she has to hold onto something.  She uses a cane part of the time.  She has been getting out of the house some with only taking 1 Xanax. She has been to [facility] which is quite an accomplishment.  She had a bad migraine in which she lost vision.  Her legs feel shaky and she feels like she will fall.  She is asking for pain pills. She asks for tylenol #3.  She has had 3 migraines in a week.  She has minor headaches that she uses tylenol or Excedrin migraine.  Sometimes she gets ringing in her ears.  I recommend that she see a neurologist. She has had issues with taking too many medications in the past.  I am not comfortable with giving her anything for headaches prn.  Her daughter Candy -that has the cancer.  This has caused her to have more headaches.  She needs to have lenses removed from her eyes.   She has quit smoking       Past Medical History  Disease Name Date Onset Notes   Arthritis --  --     Asthma --  --    Cataract --  --    COPD (chronic obstructive pulmonary disease) --  --    Diabetes --  --    Hyperlipidemia --  --    Hypertension --  --    IBS (irritable bowel syndrome) --  --    Reflux --  --    Rheumatoid arthritis --  --    Seizure --  --          Past Surgical History  Procedure Name Date Notes   Gallbladder --  --    Hysterectomy --  --          Medication List  Name Date Started Instructions   amlodipine 10 mg oral tablet 07/02/2019 take 1 tablet by mouth once daily   Anoro Ellipta 62.5-25 mcg/actuation inhalation blister with device 07/02/2019 inhale 1 puff by inhalation route once daily at the same time each day for 30 days   hydrocodone-acetaminophen 5-325 mg oral tablet 06/14/2019 take 1 tablet by oral route daily for 10 days prn   hydroxyzine pamoate 25 mg oral capsule 09/04/2019 take 1 capsule (25 mg) by oral route 3 times per day for 30 days   Keppra 500 mg oral tablet 07/02/2019 take 1 tablet (500 mg) by oral route 2 times per day for 30 days   levalbuterol HCl 0.31 mg/3 mL inhalation solution for nebulization 07/02/2019 use in nebulizer as directed 3 times a day for 30 days   lisinopril 20 mg oral tablet 07/02/2019 take 1 tablet (20 mg) by oral route once daily for 90 days   omeprazole 20 mg oral capsule,delayed release(DR/EC) 07/02/2019 take 1 capsule by mouth once daily   pravastatin 40 mg oral tablet 07/02/2019 take 1 tablet by mouth at bedtime   Remeron 15 mg oral tablet 07/02/2019 take 1 tablet (15 mg) by oral route once daily before bedtime for 30 days   Ventolin HFA 90 mcg/actuation inhalation HFA aerosol inhaler 07/02/2019 inhale 1 - 2 puffs (90 - 180 mcg) by inhalation route every 4-6 hours as needed for 30 days   Xanax 0.5 mg oral tablet 07/02/2019 take 1 tablet (0.5 mg) by oral route 3 times per day for 30 days         Allergy List  Allergen Name Date Reaction Notes   BuSpar --  --  --    Celexa --  --  --    Darvocet-N 100 --  --  --    Elavil --  --  --    ibuprofen  "--  --  --    Imitrex --  --  --    Paxil --  --  --    SULFA (SULFONAMIDES) --  --  --    tramadol --  --  --    Tylenol-Codeine #3 --  --  --    Vioxx --  --  --    Zoloft --  --  --          Family Medical History  Disease Name Relative/Age Notes   Stroke  --    Heart Disease  --    Hypertension  --    Cancer, Unspecified  --    Diabetes  --          Social History  Finding Status Start/Stop Quantity Notes   Alcohol Never --/-- --  03/14/2018 -    Former smoker --  --/-- --  03/14/2018 -    Tobacco Former --/-- --  FORMER SMOKER         Immunizations  NameDate Admin Mfg Trade Name Lot Number Route Inj VIS Given VIS Publication   [id number]/17/2018 PMC FLUZONE-HIGH DOSE [id number] IM  09/17/2018 08/07/2015   Comments: ndc [id number]         Vitals  Date Time BP Position Site L\R Cuff Size HR RR TEMP (F) WT  HT  BMI kg/m2 BSA m2 O2 Sat        09/23/2019 02:36 /70 Sitting    97 - R  98.5 111lbs 2oz 5'  3\" 19.68 1.5 91 %          Physical Examination  · Constitutional  o Appearance  o : well developed, well-nourished, she is talking less jumbled and more coherently today.   · Eyes  o Conjunctivae  o : conjunctivae normal  · Neck  o Thyroid  o : no thyromegaly  · Respiratory  o Respiratory Effort  o : breathing unlabored  o Auscultation of Lungs  o : clear to ascultation  · Cardiovascular  o Heart  o :   § Auscultation of Heart  § : regular rate and rhythm  o Peripheral Vascular System  o :   § Extremities  § : no edema  · Lymphatic  o Neck  o : no lymphadenopathy present  · Musculoskeletal  o General  o :   § General Musculoskeletal  § : grossly normal.mild tremor involving her head  · Skin and Subcutaneous Tissue  o General Inspection  o : normal  · Neurologic  o Gait and Station  o :   § Gait Screening  § : normal gait  · Psychiatric  o Mood and Affect  o : mild anxiety, affect appropriate          Assessment  · Anxiety disorder     300.00/F41.9  · Headache     784.0/R51    Problems " Reconciled  Plan  · Orders  o ACO-39: Current medications updated and reviewed () - - 09/23/2019  o NEUROLOGY CONSULTATION. (NEURO) - 784.0/R51 - 09/23/2019  · Medications  o Remeron 30 mg oral tablet   SIG: take 1 tablet (30 mg) by oral route once daily before bedtime for 30 days   DISP: (30) tablets with 5 refills  Adjusted on 09/23/2019     o Xanax 0.5 mg oral tablet   SIG: take 1 tablet (0.5 mg) by oral route 3 times per day for 30 days   DISP: (90) tablets with 2 refills  Adjusted on 09/23/2019     o Medications have been Reconciled  o Transition of Care or Provider Policy  · Instructions  o Patient was educated/instructed on their diagnosis, treatment and medications prior to discharge from the clinic today.            Electronically Signed by: Traci Jensen MD -Author on September 23, 2019 03:27:37 PM

## 2021-05-07 NOTE — PROGRESS NOTES
Progress Note      Patient Name: Bessy Leggett   Patient ID: [id number]   Sex: Female   YOB: 1950    Primary Care Provider: Traci Jensen MD   Referring Provider: Traci Jensen MD    Visit Date: March 14, 2018    Provider: Traci Jensen MD   Location: [facility]   Location Address: [address] [place] [address]  [id number]   Location Phone: [phone]          Chief Complaint           Patient  needs med refills and also needs some questions answered regarding her seizures.       History Of Present Illness  Bessy Leggett is a 68 year old /White female who presents for evaluation and treatment of:      She has been getting sick from the B12 and the cholesterol medications.  We will switch to giving her the shot instead.  She vomited last night.  She has been more nervous.  She gets really nervous when she leaves the house or when the family brings all the kids.    When she came home from the hospital they gave her Zofran for nausea and she needs a refill on it.    She has the seizure medication at home.  She has not been taking the gabapentin for awhile.  She thinks it blows her up.  She thinks the Keppra doesn't bother her.    She needs a referral to Dr. Martin Jeffrey.  I think we already did that.  She thinks her memory is getting better.  She brought in her paper about her agoraphobia.  I think the reason is that I suggested that she needs off the Xanax and the paper says it is the best treatment for agoraphobia.  She took the pain pills.  She had 14 pills.  She saves them for road trips because it hurts to ride.  She hurts in her pelvis and back both.    She had an appointment in [place] on the day it was icy and she didn't go. It was with Dr. Couch and she has rescheduled it .  She is unsure what she is suppose to do with the Xanax.  Last visit I told her that the plan was to go to Xanax 0.5mg TID but she had already picked up her Xanax and  "she had #60 from the old script.  [facility]  She mentions how she could go out shopping and do things when she was on Xanax 1 mg TID.  I have offered a referral to a psychiatrist once again.  She knows she needs to see a psychiatrist.  I think Dr. Fred Martinez would be a good fit.  I have explained that she will have seizures if she decides to hold her pills and save them for visits to the doctor.       Vitals  Date Time BP Position Site L\R Cuff Size HR RR TEMP(F) WT  HT  BMI kg/m2 BSA m2 O2 Sat HC       03/14/2018 11:19 /80 Sitting    118 - R  98.2 125lbs 0oz 5'  3\" 22.14 1.59 94 %          Physical Examination  · Constitutional  o Appearance  o : well developed, well-nourished, in no acute distress She is alone today. She is able to talk and make better sense this week. She is less confused and less anxious  · Head and Face  o HEENT  o : Lips are dry.  · Eyes  o Conjunctivae  o : conjunctivae normal  · Neck  o Inspection/Palpation  o : supple  o Thyroid  o : no thyromegaly  · Respiratory  o Respiratory Effort  o : breathing unlabored  o Auscultation of Lungs  o : clear to ascultation  · Cardiovascular  o Heart  o :   § Auscultation of Heart  § : regular rate and rhythm  o Peripheral Vascular System  o :   § Extremities  § : no edema  · Lymphatic  o Neck  o : no lymphadenopathy present  · Musculoskeletal  o General  o :   § General Musculoskeletal  § : She is not as feeble as she was last visit. She is able to get up on the table  · Skin and Subcutaneous Tissue  o General Inspection  o : tenting on her hands  · Neurologic  o Gait and Station  o :   § Gait Screening  § : normal gait  · Psychiatric  o Mood and Affect  o : mood normal, affect appropriate          Assessment  · Vitamin B12 deficiency     266.2/E53.8  · Vitamin B 12 deficiency     266.2/E53.8  · Seizure     780.39/R56.9  · PUD (peptic ulcer disease)     533.90/K27.9  · Nausea & " vomiting     787.01/R11.2      Plan  · Orders  o Vitamin B12 Injection () - 266.2/E53.8 - 03/14/2018  o ACO-39: Current medications updated and reviewed () - - 03/14/2018  o Cyanocobalamin (Vit B12) (61132) - 266.2/E53.8 - 03/14/2018   LOT [id number] EXP 10/2019 NDC 7122-6811-76  o NEUROLOGY CONSULTATION. (NEURO) - 780.39/R56.9 - 03/14/2018   She may already have a referral. She is going for seizures. I think she had withdrawal  o GASTROENTEROLOGY (GASTR) - 787.01/R11.2 - 03/14/2018   She had colonoscopy in the past and she continues to complain of nausea and vomiting. She is followed by Dr. Bernabe and is due a follow up  · Medications  o Keppra 500 mg oral tablet   SIG: take 1 tablet (500 mg) by oral route 2 times per day for 30 days   DISP: (60) tablets with 3 refills  Prescribed on 03/14/2018     o Zofran ODT 4 mg oral tablet,disintegrating   SIG: dissolve 1 tablet by oral route 3 times a day for 7 days   DISP: (21) tablets with 0 refills  Prescribed on 03/14/2018     o Vitamin B-12 1,000 mcg/mL injection solution   SIG: inject 1 milliliter (1,000 mcg) by subcutaneous route once a month for 30 days   DISP: (1) 30 ml vial with 11 refills  Prescribed on 03/14/2018     o gabapentin 300 mg oral capsule   SIG: take 1 capsule (300 mg) by oral route 3 times per day for 30 days   DISP: (90) capsule with 5 refills  Discontinued on 03/14/2018     o Macrodantin 100 mg oral capsule   SIG: take 1 capsule by oral route 2 times a day for 7 days   DISP: (14) capsules with 0 refills  Discontinued on 03/14/2018     · Instructions  o Patient was educated/instructed on their diagnosis, treatment and medications prior to discharge from the clinic today.  o I called [facility] and let Garth know that she is NOT to receive anymore of the Xanax 1mg BID. They do have the Xanax 0.5 mg TID on hold. He has cancelled the other script refills.            Electronically Signed by: Traci Jensen MD -Author on March 23,  2018 04:24:09 PM

## 2021-05-07 NOTE — PROGRESS NOTES
Progress Note      Patient Name: Bessy Leggett   Patient ID: [id number]   Sex: Female   YOB: 1950    Primary Care Provider: Traci Jensen MD   Referring Provider: Traci Jensen MD    Visit Date: January 10, 2020    Provider: Erich Joya DO   Location: [facility]   Location Address: [address] [place] [address] [place]  [address]   Location Phone: [phone]          Chief Complaint     BREATHING CONCERNS; ANXIETY       History Of Present Illness  Bessy Leggett is a 69 year old /White female who presents for evaluation and treatment of:      1.) DIFFICULTY IN BREATHING : Presents for follow-up regarding difficulty in breathing. A recent PFT was performed at [facility]. However PFT is not noted in chart (will request). She reports that she continues to experience intermittent difficulty breathing. She is currently prescribed Anuro and Qvar. Denies any recent upper respiratory illnesses.     2.) ANXIETY : Patient presents with complaints of anxiety symptoms. History of depression where she is currently prescribed antidepressant medications. PHQ 9 score is noted at 15. She reports treated with familial stressors. She notes a previous intermittent prescription for alprazolam, which helped w/ insomnia sxs, as well as anxiety sxs.       Past Medical History  Disease Name Date Onset Notes   Arthritis --  --    Asthma --  --    Cataract --  --    COPD (chronic obstructive pulmonary disease) --  --    Diabetes mellitus, type 2 --  --    Hyperlipidemia --  --    Hypertension --  --    IBS (irritable bowel syndrome) --  --    Reflux --  --    Rheumatoid arthritis --  --    Seizure --  --          Past Surgical History  Procedure Name Date Notes   Colonoscopy and EGD, with anesthesia 11-02-18 --    Gallbladder --  --    Hysterectomy --  --          Medication List  Name Date Started Instructions   amlodipine 10 mg oral tablet 12/17/2019 take 1 tablet by  mouth once daily   Anoro Ellipta 62.5-25 mcg/actuation inhalation blister with device 12/17/2019 inhale 1 puff by inhalation route once daily at the same time each day for 30 days   dicyclomine 10 mg oral capsule 12/17/2019 take 1 capsule (10 mg) by oral route 3 times per day PRN abdominal pain   hydroxyzine pamoate 50 mg oral capsule 12/17/2019 take 1 capsule by oral route BID PRN anxiety   Keppra 500 mg oral tablet 12/17/2019 take 1 tablet (500 mg) by oral route 2 times per day for 30 days   levalbuterol HCl 0.31 mg/3 mL inhalation solution for nebulization 12/17/2019 use in nebulizer as directed 3 times a day for 30 days   lisinopril 20 mg oral tablet 12/17/2019 take 1 tablet (20 mg) by oral route once daily for 90 days   omeprazole 20 mg oral capsule,delayed release(DR/EC) 12/17/2019 take 1 capsule by mouth once daily   pravastatin 40 mg oral tablet 12/17/2019 take 1 tablet by mouth at bedtime   Qvar RediHaler 40 mcg/actuation inhalation HFA aerosol breath activated 12/17/2019 inhale 1 puff (40 mcg) by inhalation route 2 times per day   Remeron 30 mg oral tablet 12/17/2019 take 1 tablet (30 mg) by oral route once daily before bedtime for 30 days   Ventolin HFA 90 mcg/actuation inhalation HFA aerosol inhaler 12/17/2019 inhale 1 - 2 puffs (90 - 180 mcg) by inhalation route every 4-6 hours as needed for 30 days   Zofran 8 mg oral tablet 12/17/2019 Take 1 tablet PO BID PRN nausea         Allergy List  Allergen Name Date Reaction Notes   BuSpar --  --  --    Celexa --  --  --    Darvocet-N 100 --  --  --    Elavil --  --  --    ibuprofen --  --  --    Imitrex --  --  --    Paxil --  --  --    SULFA (SULFONAMIDES) --  --  --    tramadol --  --  --    Tylenol-Codeine #3 --  --  --    Vioxx --  --  --    Zoloft --  --  --          Family Medical History  Disease Name Relative/Age Notes   Stroke  --    Heart Disease  --    Hypertension  --    Cancer, Unspecified  --    Diabetes  --          Social History  Finding Status  "Start/Stop Quantity Notes   Alcohol Never --/-- --  03/14/2018 -    Tobacco Former --/-- --  FORMER SMOKER         Immunizations  NameDate Admin Mfg Trade Name Lot Number Route Inj VIS Given VIS Publication   [id number]/17/2019 [facility] FLUZONE-HIGH DOSE [id number] [facility] 12/17/2019    Comments: [id number]   Pbuyjxjsa99/17/2018 [facility] FLUZONE-HIGH DOSE [id number] IM  09/17/2018 08/07/2015   Comments: ndc [id number]         Review of Systems  · Constitutional  o Denies  o : fever, chills  · Eyes  o Denies  o : discharge from eye, eye discomfort  · HENT  o Denies  o : lightheadedness, nasal congestion  · Cardiovascular  o Denies  o : chest pain, syncope  · Respiratory  o Admits  o : shortness of breath, cough  · Gastrointestinal  o Denies  o : nausea, vomiting, diarrhea  · Integument  o Denies  o : rash, skin dryness  · Neurologic  o Denies  o : altered mental status, tingling or numbness  · Musculoskeletal  o Denies  o : limitation of motion, muscular weakness  · Psychiatric  o Admits  o : anxiety, depression  o Denies  o : suicidal ideation, homicidal ideation  · Heme-Lymph  o Denies  o : petechiae, purpura      Vitals  Date Time BP Position Site L\R Cuff Size HR RR TEMP (F) WT  HT  BMI kg/m2 BSA m2 O2 Sat        01/10/2020 11:07 /68 Sitting    106 - R  98 112lbs 6oz 5'  3\" 19.91 1.51 93 %          Physical Examination  · Constitutional  o Appearance  o : frail and chronically ill appearing  · Head and Face  o HEENT  o : Unremarkable  · Eyes  o Conjunctivae  o : conjunctivae normal  · Neck  o Inspection/Palpation  o : supple  · Respiratory  o Respiratory Effort  o : breathing unlabored  o Auscultation of Lungs  o : decreased breath sounds with bilateral wheezing noted  · Cardiovascular  o Heart  o :   § Auscultation of Heart  § : regular rate and rhythm  o Peripheral Vascular System  o :   § Extremities  § : no edema  · Lymphatic  o Neck  o : no lymphadenopathy present  · Musculoskeletal  o General  o :   § General " Musculoskeletal  § : no joint deformity   · Skin and Subcutaneous Tissue  o General Inspection  o : no rashes appreciated  · Neurologic  o Gait and Station  o :   § Gait Screening  § : normal gait  · Psychiatric  o Mood and Affect  o : mood normal, affect appropriate              Assessment  · Anxiety disorder     300.00/F41.9    A.) Patient was advised that the prescription of Alprazolam will only be used for the short term and she should not expect to be on the medication chronically; she will continue to take anti-depressant PRN; she is not interested in talk therapy at this time.    · Dyspnea     786.09/R06.00    A.) Requesting PFT; continue Anuro; discontinue Qvar and start   Flovent; pending PFT results will likely consider referral to pulmonology.      Problems Reconciled  Plan  · Orders  o ACO-19: Colorectal cancer screening results documented and reviewed (3017F) - - 01/10/2020  o ACO-39: Current medications updated and reviewed () - - 01/10/2020  · Medications  o alprazolam 1 mg oral tablet   SIG: Take 1 tablet PO PRN anxiety   DISP: (30) tablets with 0 refills  Prescribed on 01/10/2020     o Flovent HFA 44 mcg/actuation inhalation HFA aerosol inhaler   SIG: inhale 2 puffs (88 mcg) by inhalation route 2 times per day   DISP: (1) 10.6 gm aer w/adap with 0 refills  Prescribed on 01/10/2020     o Qvar RediHaler 40 mcg/actuation inhalation HFA aerosol breath activated   SIG: inhale 1 puff (40 mcg) by inhalation route 2 times per day   DISP: (1) Hfa aerosol breath activated with 2 refills  Discontinued on 01/10/2020     o Medications have been Reconciled  o Transition of Care or Provider Policy  · Instructions  o Take all medications as prescribed/directed.  o Patient was educated/instructed on their diagnosis, treatment and medications prior to discharge from the clinic today.  o FOLLOW UP IN 1 MONTH.            Electronically Signed by: Erich Joya DO - on January 10, 2020 12:46:51 PM

## 2021-05-07 NOTE — PROGRESS NOTES
"   Progress Note      Patient Name: Bessy Leggett   Patient ID: [id number]   Sex: Female   YOB: 1950    Primary Care Provider: Traci Jensen MD   Referring Provider: Traci Jensen MD    Visit Date: January 13, 2021    Provider: Erich Joya DO   Location: [facility]   Location Address: [address] [place] [address]   [place]  [address]   Location Phone: [phone]          Chief Complaint     Legs weak/numb and feels shaky all the time -- has been going on for \"a very long time\"       History Of Present Illness  Bessy Leggett is a 70 year old /White female who presents for evaluation and treatment of:      1.) NIDDM : Most recent hemoglobin A1c within normal limits. The patient does not take any medications for diabetes. She notes that she does not routinely check her blood glucose at home. She notes that she is in need of a new diabetic testing meter. Admits to numbness and tingling of her feet - admits to an unstable gait. No recent dilated retinal exam in chart - she admits to loss of vision.    2.) COPD : Patient presents with complaints of difficulty breathing. She is prescribed Flovent and Anoro. She uses a rescue inhaler as needed. Recent PFT completed in 1/20/2020 revealing severe obstructive airway disease. She is not followed by pulmonology. History of tobacco use - smoked up to 1 pack/day x 15 years -quit 4 months ago.    3.) HISTORY OF SEIZURES : Prescribed Keppra. The patient is not followed by neurology. Notes that she was previously followed by neurology, but does not remember the name of the practice or physician. She is taking her medication as prescribed. She denies any seizures since her last visit here in our clinic.    4.) DIZZINESS : The patient presents with dizziness. Unknown onset of symptoms. She notes that she feels as if the room is always spinning. She admits to difficulty with her gait secondary to her dizziness. No recent URI symptoms. " She denies any ear symptoms such as congestion, tinnitus or hearing loss.       Past Medical History  Disease Name Date Onset Notes   Arthritis --  --    Asthma --  --    Cataract --  --    COPD (chronic obstructive pulmonary disease) --  --    GERD (gastroesophageal reflux disease) --  --    Hyperlipidemia --  --    Hypertension --  --    IBS (irritable bowel syndrome) --  --    Non-insulin dependent type 2 diabetes mellitus --  --    Rheumatoid arthritis --  --    Seizure --  --          Past Surgical History  Procedure Name Date Notes   Colonoscopy and EGD, with anesthesia 11-02-18 --    Gallbladder --  --    Hysterectomy --  --          Medication List  Name Date Started Instructions   albuterol sulfate 2.5 mg/0.5 mL inhalation solution for nebulization 11/18/2020 inhale 0.5 milliliter (2.5 mg) by nebulization route 3 times per day as needed   amlodipine 10 mg oral tablet 11/10/2020 TAKE 1 TABLET BY MOUTH EVERY DAY   Anoro Ellipta 62.5-25 mcg/actuation inhalation blister with device 11/10/2020 inhale 1 puff by inhalation route once daily at the same time each day for 30 days   Flovent HFA 44 mcg/actuation inhalation HFA aerosol inhaler 11/10/2020 INHALE 2 PUFFS(88 MCG) BY MOUTH TWICE DAILY   FLOVENT HFA 44MCG ORAL INH 120INH 01/05/2021 INHALE 2 PUFFS(88 MCG) BY MOUTH TWICE DAILY   hydroxyzine pamoate 50 mg oral capsule 11/10/2020 TAKE 1 CAPSULE BY MOUTH TWICE DAILY AS NEEDED FOR ANXIETY for 90 days   Keppra 500 mg oral tablet 11/10/2020 take 1 tablet (500 mg) by oral route 2 times per day for 30 days   lisinopril 20 mg oral tablet 11/10/2020 TAKE 1 TABLET BY MOUTH ONCE DAILY   mirtazapine 30 mg oral tablet 11/10/2020 TAKE 1 TABLET BY MOUTH EVERY DAY AT BEDTIME   omeprazole 20 mg oral capsule,delayed release(DR/EC) 11/10/2020 TAKE 1 CAPSULE BY MOUTH EVERY DAY PRN   pravastatin 40 mg oral tablet 11/10/2020 TAKE 1 TABLET BY MOUTH AT BEDTIME   Remeron 30 mg oral tablet 11/10/2020 take 1 tablet (30 mg) by oral route  once daily before bedtime for 30 days   Ventolin HFA 90 mcg/actuation inhalation HFA aerosol inhaler 11/10/2020 INHALE 1 TO 2 PUFFS BY MOUTH AND INTO THE LUNGS EVERY 4 TO 6 HOURS IF NEEDED         Allergy List  Allergen Name Date Reaction Notes   BuSpar --  --  --    Celexa --  --  --    Darvocet-N 100 --  --  --    Elavil --  --  --    ibuprofen --  --  --    Imitrex --  --  --    Paxil --  --  --    SULFA (SULFONAMIDES) --  --  --    tramadol --  --  --    Tylenol-Codeine #3 --  --  --    Vioxx --  --  --    Zoloft --  --  --        Allergies Reconciled  Family Medical History  Disease Name Relative/Age Notes   Stroke  --    Heart Disease  --    Hypertension  --    Cancer, Unspecified  --    Diabetes  --          Social History  Finding Status Start/Stop Quantity Notes   Alcohol Never --/-- --  03/14/2018 -    Tobacco Former --/-- --  FORMER SMOKER         Immunizations  NameDate Admin Mfg Trade Name Lot Number Route Inj VIS Given VIS Publication   Vhfclxfjx49/17/2019 [facility] FLUZONE-HIGH DOSE [id number] IM LD 12/17/2019    Comments: [id number]         Review of Systems  · Constitutional  o Denies  o : fatigue, night sweats  · Eyes  o Admits  o : impaired vision  · HENT  o Admits  o : dizziness  o Denies  o : recent head injury  · Cardiovascular  o Denies  o : chest pain, irregular heart beats  · Respiratory  o Admits  o : shortness of breath, cough  · Gastrointestinal  o Denies  o : nausea, vomiting  · Genitourinary  o Denies  o : dysuria, urinary retention  · Integument  o Denies  o : hair growth change, new skin lesions  · Neurologic  o Denies  o : altered mental status, seizures  · Musculoskeletal  o Admits  o : numbness and tingling of feet  o Denies  o : joint swelling, limitation of motion  · Endocrine  o Denies  o : cold intolerance, heat intolerance  · Psychiatric  o Denies  o : hallucinations, delusions  · Heme-Lymph  o Denies  o : petechiae, lymph node enlargement or  tenderness  · Allergic-Immunologic  o Denies  o : frequent illnesses      Vitals  Date Time BP Position Site L\R Cuff Size HR RR TEMP (F) WT  HT  BMI kg/m2 BSA m2 O2 Sat FR L/min FiO2 HC       01/13/2021 08:35 /94 Sitting    110 - R  97.3 132lbs 0oz    95 %  21%          Physical Examination  · Constitutional  o Appearance  o : alert, in no acute distress  · Eyes  o Conjunctivae  o : conjunctivae normal  · Ears, Nose, Mouth and Throat  o Ears  o :   § External Ears  § : appearance within normal limits, no lesions present  § Otoscopic Examination  § : tympanic membrane appearance within normal limits bilaterally  § Hearing  § : intact to conversational voice both ears  o Nose  o :   § External Nose  § : appearance normal  o Oral Cavity  o :   § Oral Mucosa  § : oral mucosa normal without pallor or cyanosis  § Lips  § : lip appearance normal  § Teeth  § : normal dentition for age  § Gums  § : gums pink, non-swollen, no bleeding present  § Tongue  § : tongue appearance normal  § Palate  § : hard palate normal, soft palate appearance normal  o Throat  o :   § Oropharynx  § : no inflammation or lesions present, tonsils within normal limits  · Neck  o Inspection/Palpation  o : supple  · Respiratory  o Respiratory Effort  o : breathing unlabored  o Auscultation of Lungs  o : diminished breath sounds present    · Cardiovascular  o Heart  o :   § Auscultation of Heart  § : regular rate and rhythm  o Peripheral Vascular System  o :   § Extremities  § : no edema  · Lymphatic  o Neck  o : no lymphadenopathy present  · Skin and Subcutaneous Tissue  o General Inspection  o : xerosis of skin  · Neurologic  o Gait and Station  o :   § Gait Screening  § : normal gait  · Psychiatric  o Mood and Affect  o : mood normal, affect appropriate  · Left DM Foot Exam  o Sensation  o : reduced sensation in some areas   o Visual Inspection  o : visual inspection reveals dry skin, untrimmed nails  o Vascular  o : palpable dorsalis pedis    · Right DM Foot Exam  o Sensation  o : reduced sensation in some areas   o Visual Inspection  o : visual inspection reveals dry skin and untrimmed nails  o Vascular  o : palpable dorsalis pedis               Assessment  · COPD (chronic obstructive pulmonary disease)     496/J44.9    A.) Course of PO Prednisone as noted. Inhalers refilled. Will refer to pulmonology for an evaluation and recommendations.     · Hypertension     401.9/I10    A.)Prescription for blood pressure cuff as an noted. Rx refilled as noted.     · Non-insulin dependent type 2 diabetes mellitus     250.00/E11.9    A.) Labs as noted. Diabetic foot exam completed, but will refer to podiatry for foot care. Will refer to both dibetic eye exam and evaluation of vision loss.     · Screening for thyroid disorder     V77.0/Z13.29    A.) TSH as noted.     · History of seizure     V12.49/Z87.898    A.) Anti-convulsant refilled - needs to re-establish care with neurology - referred.     · Vision loss     369.9/H54.7    A.) See above.     · Dizziness     780.4/R42    A.) Likely multifactorial - labs as noted. Meclizine PRN. Re-evaluation in 1 week.         Plan  · Orders  o ACO-39: Current medications updated and reviewed (1159F, ) - - 01/13/2021  o Diabetes 2 Panel (CMP, Lipid, A1c, Urine Microalbumin) [facility] (96803, 78876, 19365, 01781) - 250.00/E11.9 - 01/13/2021  o CBC with Manual Diff if indicated [facility] (80641, 23295) - 401.9/I10 - 01/13/2021  o TSH + free t4 (15658, 22569) - V77.0/Z13.29 - 01/13/2021  o Diabetic Dilated Eye Exam Consult with Ophthalmology/Optometry (DMEYE) - 250.00/E11.9 - 01/13/2021  o Diabetic Foot (Motor and Sensory) Exam Completed [facility] (, , 2028F) - 250.00/E11.9 - 01/13/2021  o NEUROLOGY CONSULTATION. (NEURO) - V12.49/Z87.898 - 01/13/2021  o Pulmonology Consultation (26) - 496/J44.9 - 01/13/2021   Please send most recent PFT.   o PODIATRY CONSULTATION (PODIA) - 250.00/E11.9 - 01/13/2021  · Medications  o meclizine 12.5 mg  oral tablet   SIG: take 1 tablet by oral route 2 times a day as needed DIZZINESS   DISP: (20) Tablet with 0 refills  Prescribed on 01/13/2021     o prednisone 20 mg oral tablet   SIG: take 3 tablets by oral route daily for 5 days   DISP: (15) Tablet with 0 refills  Prescribed on 01/13/2021     o amlodipine 10 mg oral tablet   SIG: TAKE 1 TABLET BY MOUTH EVERY DAY   DISP: (90) Tablet with 1 refills  Refilled on 01/13/2021     o Anoro Ellipta 62.5-25 mcg/actuation inhalation blister with device   SIG: inhale 1 puff by inhalation route once daily at the same time each day for 30 days   DISP: (60) Blister with 5 refills  Refilled on 01/13/2021     o Flovent HFA 44 mcg/actuation inhalation HFA aerosol inhaler   SIG: INHALE 2 PUFFS(88 MCG) BY MOUTH TWICE DAILY   DISP: (10.6) Gram with 5 refills  Refilled on 01/13/2021     o hydroxyzine pamoate 50 mg oral capsule   SIG: TAKE 1 CAPSULE BY MOUTH TWICE DAILY AS NEEDED FOR ANXIETY for 90 days   DISP: (180) Capsule with 1 refills  Refilled on 01/13/2021     o Keppra 500 mg oral tablet   SIG: take 1 tablet (500 mg) by oral route 2 times per day for 30 days   DISP: (60) Tablet with 5 refills  Refilled on 01/13/2021     o lisinopril 20 mg oral tablet   SIG: TAKE 1 TABLET BY MOUTH ONCE DAILY   DISP: (90) Tablet with 1 refills  Refilled on 01/13/2021     o mirtazapine 30 mg oral tablet   SIG: TAKE 1 TABLET BY MOUTH EVERY DAY AT BEDTIME   DISP: (30) Tablet with 5 refills  Refilled on 01/13/2021     o omeprazole 20 mg oral capsule,delayed release(DR/EC)   SIG: TAKE 1 CAPSULE BY MOUTH EVERY DAY PRN   DISP: (90) Capsule with 1 refills  Refilled on 01/13/2021     o pravastatin 40 mg oral tablet   SIG: TAKE 1 TABLET BY MOUTH AT BEDTIME   DISP: (90) Tablet with 1 refills  Refilled on 01/13/2021     o Ventolin HFA 90 mcg/actuation inhalation HFA aerosol inhaler   SIG: INHALE 1 TO 2 PUFFS BY MOUTH AND INTO THE LUNGS EVERY 4 TO 6 HOURS IF NEEDED   DISP: (18) Gram with 5 refills  Refilled on  01/13/2021     o Medications have been Reconciled  o Transition of Care or Provider Policy  · Instructions  o Take all medications as prescribed/directed.  o Patient was educated/instructed on their diagnosis, treatment and medications prior to discharge from the clinic today.  o SCHEDULE MEDICARE WELLNESS VISIT. RETURN IN 1 WEEK.  · Disposition  o Care Transition  o BENNETT Sent            Electronically Signed by: Erich Joya DO -Author on January 13, 2021 12:21:03 PM

## 2021-05-07 NOTE — PROGRESS NOTES
"   Progress Note      Patient Name: Bessy Leggett   Patient ID: [id number]   Sex: Female   YOB: 1950    Primary Care Provider: Traci Jensen MD   Referring Provider: Traci Jensen MD    Visit Date: June 7, 2018    Provider: CALEB Meadows   Location: [facility]   Location Address: [address] [place] [address]  [id number]   Location Phone: [phone]          Chief Complaint     PATIENT WENT TO [facility] ER 5-30-18.  SHE WAS THERE FOR ABOUT 10 HOURS.  SHE WAS VERY SICK WITH DIARRHEA AND VOMITING.  SHE WAS  VERY CONFUSED.   PUT HER BACK ON THE [facility] MEDICINE.  SHE HAS LOST 16 POUNDS SINCE DECEMBER.       History Of Present Illness  Bessy Leggett is a 68 year old /White female who presents for evaluation and treatment of:      20 yrs--    pt was on the keppra per DR Jeffrey for the seizures--and then sees another neurologist for a scan to check for a brain aneurysm     and already sees a vascular who watches her AAA--was scanning her 2 times yearly and now will be doing in 3 times a yr--    was placed on Ativan at the [facility] --    and the pt been off her Xanax for approx. 1 month-due to not being able to F/U with DR Jensen--and pt admits to canceling several     pt gets very anxious and nervous feeling and then dreams and states she feels like they are real--    and pt states the dreams were so vivid she thought was real--and then she would think was in different times or places--like [place] or the old time western days or would be paranoid--    then the pt had very elevated BP when at the ER and then was so sleep deprived before as well--    and pt admits would tear up things and break the remote and TV and throw her phone--    the ER MD felt like needed to cont the Ativan--and not be taken off--    \">pt was hospitalized on 5/30/18 DC 5/31/18--at [facility]-- having N/V/D--and talking out of her head--  pt was on Xanax per Dr Jensen--and she was tapering " her off slowly--decreasing the dosing and frequency--and pt was weaned down to the 0.5 mg BID on the Xanax--used to be on the Xanax 1 mg TID then over 1 yr was weaned down to BID then was on the full 1 mg BID for approx. 2 months then weaned to the 0.5 mg in March--BID--pt had been on them for >20 yrs--    pt was on the keppra per DR Jeffrey for the seizures--and then sees another neurologist for a scan to check for a brain aneurysm     and already sees a vascular who watches her AAA--was scanning her 2 times yearly and now will be doing in 3 times a yr--    was placed on Ativan at the [facility] --    and the pt been off her Xanax for approx. 1 month-due to not being able to F/U with DR Jensen--and pt admits to canceling several     pt gets very anxious and nervous feeling and then dreams and states she feels like they are real--    and pt states the dreams were so vivid she thought was real--and then she would think was in different times or places--like [place] or the old time western days or would be paranoid--    then the pt had very elevated BP when at the ER and then was so sleep deprived before as well--    and pt admits would tear up things and break the remote and TV and throw her phone--    the ER MD felt like needed to cont the Ativan--and not be taken off--           Past Medical History  Disease Name Date Onset Notes   Arthritis --  --    Asthma --  --    Cataract --  --    COPD (chronic obstructive pulmonary disease) --  --    Diabetes --  --    Hyperlipidemia --  --    Hypertension --  --    IBS (irritable bowel syndrome) --  --    Reflux --  --    Rheumatoid arthritis --  --    Seizure --  --          Past Surgical History  Procedure Name Date Notes   Gallbladder --  --    Hysterectomy --  --          Medication List  Name Date Started Instructions   alprazolam 0.5 mg oral tablet 03/05/2018 take 1 tablet (0.5 mg) by oral route 3 times per day for 30 days   amlodipine 10 mg oral tablet 12/12/2017 take 1  tablet (10 mg) by oral route once daily for 90 days   Anoro Ellipta 62.5-25 mcg/actuation inhalation blister with device 12/12/2017 inhale 1 puff by inhalation route once daily at the same time each day for 30 days   hydrochlorothiazide 12.5 mg oral capsule 12/12/2017 take 1 capsule (12.5 mg) by oral route once daily for 30 days   Keppra 500 mg oral tablet 03/14/2018 take 1 tablet (500 mg) by oral route 2 times per day for 30 days   levalbuterol HCl 0.31 mg/3 mL inhalation solution for nebulization 12/12/2017 use in nebulizer as directed 3 times a day for 30 days   lisinopril 40 mg oral tablet 12/12/2017 take 1 tablet (40 mg) by oral route once daily for 30 days   omeprazole 40 mg oral capsule,delayed release(DR/EC) 12/12/2017 take 1 capsule by oral route daily for 30 days   pravastatin 40 mg oral tablet 12/12/2017 take 1 tablet (40 mg) by oral route once daily at bedtime for 30 days   Ventolin HFA 90 mcg/actuation inhalation HFA aerosol inhaler 03/28/2018 inhale 1 - 2 puffs (90 - 180 mcg) by inhalation route every 4-6 hours as needed for 30 days   Viibryd 10 mg oral tablet 12/12/2017 take 1 tablet by oral route daily for 30 days   Vitamin B-12 1,000 mcg/mL injection solution 03/14/2018 inject 1 milliliter (1,000 mcg) by subcutaneous route once a month for 30 days   Zofran ODT 4 mg oral tablet,disintegrating 03/14/2018 dissolve 1 tablet by oral route 3 times a day for 7 days         Allergy List  Allergen Name Date Reaction Notes   BuSpar --  --  --    Celexa --  --  --    Darvocet-N 100 --  --  --    Elavil --  --  --    ibuprofen --  --  --    Imitrex --  --  --    Paxil --  --  --    SULFA (SULFONAMIDES) --  --  --    tramadol --  --  --    Tylenol-Codeine #3 --  --  --    Vioxx --  --  --    Zoloft --  --  --          Family Medical History  Disease Name Relative/Age Notes   Cancer, Unspecified / --    Diabetes / --    Heart Disease / --    Hypertension / --    Stroke / --          Social History  Finding Status  "Start/Stop Quantity Notes   Alcohol Never --/-- --  03/14/2018 -    Former smoker --  --/-- --  03/14/2018 -    Tobacco --  --/-- --  FORMER SMOKER         Review of Systems  · Constitutional  o Admits  o : fatigue  · HENT  o Denies  o : vertigo, recent head injury  · Cardiovascular  o Denies  o : chest pain, irregular heart beats  · Respiratory  o Denies  o : shortness of breath, productive cough  · Gastrointestinal  o Admits  o : nausea, diarrhea  o Denies  o : vomiting  · Genitourinary  o Denies  o : dysuria, urinary retention  · Integument  o Denies  o : hair growth change, new skin lesions  · Neurologic  o Denies  o : altered mental status, seizures  · Musculoskeletal  o Admits  o : joint pain, muscle pain  o Denies  o : joint swelling, limitation of motion  · Endocrine  o Denies  o : cold intolerance, heat intolerance  · Psychiatric  o Admits  o : anxiety  o Denies  o : suicidal ideation, homicidal ideation  · Heme-Lymph  o Denies  o : petechiae, lymph node enlargement or tenderness  · Allergic-Immunologic  o Denies  o : frequent illnesses      Vitals  Date Time BP Position Site L\R Cuff Size HR RR TEMP(F) WT  HT  BMI kg/m2 BSA m2 O2 Sat        06/07/2018 02:38 /84 Sitting    102 - R  97.8 120lbs 8oz 5'  3\" 21.35 1.56 93 %           Physical Examination  · Constitutional  o Appearance  o : well developed, well-nourished, in no acute distress--DROPPED 16# SINCE DEC 2017  · Eyes  o Conjunctivae  o : conjunctivae normal  · Neck  o Inspection/Palpation  o : supple  o Thyroid  o : no thyromegaly  · Respiratory  o Respiratory Effort  o : breathing unlabored  o Auscultation of Lungs  o : clear to ascultation  · Cardiovascular  o Heart  o :   § Auscultation of Heart  § : regular rate and rhythm  o Peripheral Vascular System  o :   § Extremities  § : no edema  · Gastrointestinal  o Abdominal Examination  o :   § Abdomen  § : soft  · Lymphatic  o Neck  o : no lymphadenopathy " present  · Musculoskeletal  o General  o :   § General Musculoskeletal  § : pt slightly tremulous at times-and appears very anxious   · Skin and Subcutaneous Tissue  o General Inspection  o : skin turgor normal, texture normal  · Neurologic  o Gait and Station  o :   § Gait Screening  § : normal gait  · Psychiatric  o Mood and Affect  o : daughter with her today and pt at times will not make eye contact--then other times makes good eye contact--very nervous acting-          Results     reviewed all the ER dept notes they brought and then also the notes I could find and print from the meditech--       Assessment  · Anxiety     300.00/F41.9  · Insomnia     780.52/G47.00  · Nausea     787.02/R11.0  · Follow up     V67.9/Z09  · Benzodiazepine withdrawal with complication       Sedative, hypnotic or anxiolytic dependence with withdrawal, unspecified     292.0/F13.239      Plan  · Orders  o ACO-39: Current medications updated and reviewed () - - 06/07/2018  · Medications  o Ativan 0.5 mg oral tablet   SIG: take 1 tablet (0.5 mg) by oral route 3 times per day as needed for 30 days   DISP: (90) tablets with 0 refills  Prescribed on 06/07/2018     o alprazolam 0.5 mg oral tablet   SIG: take 1 tablet (0.5 mg) by oral route 3 times per day for 30 days   DISP: (90) tablets with 0 refills  Discontinued on 06/07/2018     · Instructions  o Obtained a written consent for CHRIS query. Discussed the risk and benefits of the use of controlled substances with the patient, including the risk of tolerance and drug dependence. The patient has been counseled on the need to have an exit strategy, including potentially discontinuing the use of controlled substances. CHRIS has or will be reviewed as soon as it becomes avaliable.  o Take all medications as prescribed/directed.  o Rest. Increase Fluids.  o Patient was educated/instructed on their diagnosis, treatment and medications prior to discharge from the clinic today.  o Patient  instructed to seek medical attention urgently for new or worsening symptoms.  o Call the office with any concerns or questions.  o Minutes spent with patient including greater than 50% in Education/Counseling/Care Coordination.  o Time spent with the patient was minutes, more than 50% face to face.  o Time In: 2:40 pm   o Time Out:3:28  o Risks, benefits, and alternatives were discussed with the patient. The patient is aware of risks associated with: CS use   o Chronic conditions reviewed and taken into consideration for today's treatment plan.  · Disposition  o Call or Return if symptoms worsen or persist.  o Appointment Requested ([id number])  Careprovider : Traci Jensen MD ([id number])  Location : [facility]  Appointment : Followup / Office Visit  Date : 1 month +/- 2 days  Override : No  Comments/Instructions : pt to F/U 1 month with DR Jensen             Electronically Signed by: CALEB Meadows -Author on June 7, 2018 05:16:28 PM

## 2021-05-07 NOTE — PROGRESS NOTES
Progress Note      Patient Name: Bessy Leggett   Patient ID: [id number]   Sex: Female   YOB: 1950    Primary Care Provider: Traci Jensen MD   Referring Provider: Traci Jensen MD    Visit Date: October 19, 2018    Provider: Traci Jensen MD   Location: [facility] [place]   Location Address: [address] [place] [address]  [id number]   Location Phone: [phone]          Chief Complaint     Follow up on weight loss       History Of Present Illness  Bessy Leggett is a 68 year old /White female who presents for evaluation and treatment of:      She continues to lose weight.  She eats but continues to lose weight.  She also has 2-3 BOOST per day.  She is either constipated or has diarrhea.  She has had colonoscopies in the past but Dr. Peacock wouldn't repeat the colonoscopy .  She has bleeding from her hemorrhoid.  She is either constipated or diarrhea.  HE said he couldn't do it because of the aneurysm in the aorta.  She has a 4.8 cm aneurysm infrarenal in the aorta.  She needs this taken care of so that she can have a nother colonoscopy.  We are concerned that there may be colon cancer and she can't have the colonoscopy.  She is losing weight despite eating.    She had the mammogram and it was okay.   She had her flu vaccine last time She had a pneumococcal also  When her daughter with cancer got out of the hospital she had extra pain medication and offered it to her.  She refused it.  The family brings her medication.  She says she only takes what I give her but she took the pills that she was given and broke them in half because they were 10 mg.  She has some more at home.  She only takes them if she is in a lot of pain.  She has a lot of pain in her legs  The 3 months she was off the medication her memories started coming back.  Family members have put stuff in her food also.       Past Medical History  Disease Name Date Onset Notes   Arthritis --  --     Asthma --  --    Cataract --  --    COPD (chronic obstructive pulmonary disease) --  --    Diabetes --  --    Hyperlipidemia --  --    Hypertension --  --    IBS (irritable bowel syndrome) --  --    Reflux --  --    Rheumatoid arthritis --  --    Seizure --  --          Past Surgical History  Procedure Name Date Notes   Gallbladder --  --    Hysterectomy --  --          Medication List  Name Date Started Instructions   amlodipine 10 mg oral tablet 07/20/2018 take 1 tablet by mouth once daily   Anoro Ellipta 62.5-25 mcg/actuation inhalation blister with device 06/18/2018 inhale 1 puff by inhalation route once daily at the same time each day for 30 days   Ativan 0.5 mg oral tablet 09/17/2018 take 1 tablet (0.5 mg) by oral route 3 times per day as needed for 30 days   hydrochlorothiazide 12.5 mg oral capsule 12/12/2017 take 1 capsule (12.5 mg) by oral route once daily for 30 days   Keppra 500 mg oral tablet 03/14/2018 take 1 tablet (500 mg) by oral route 2 times per day for 30 days   levalbuterol HCl 0.31 mg/3 mL inhalation solution for nebulization 12/12/2017 use in nebulizer as directed 3 times a day for 30 days   lisinopril 40 mg oral tablet 12/12/2017 take 1 tablet (40 mg) by oral route once daily for 30 days   omeprazole 20 mg oral capsule,delayed release(DR/EC) 06/08/2018 take 1 capsule by mouth once daily   omeprazole 40 mg oral capsule,delayed release(DR/EC) 06/18/2018 take 1 capsule by oral route daily for 30 days   pravastatin 40 mg oral tablet 07/24/2018 take 1 tablet by mouth at bedtime   Ventolin HFA 90 mcg/actuation inhalation HFA aerosol inhaler 03/28/2018 inhale 1 - 2 puffs (90 - 180 mcg) by inhalation route every 4-6 hours as needed for 30 days   Viibryd 10 mg oral tablet 12/12/2017 take 1 tablet by oral route daily for 30 days   Vitamin B-12 1,000 mcg/mL injection solution 03/14/2018 inject 1 milliliter (1,000 mcg) by subcutaneous route once a month for 30 days   Zofran ODT 4 mg oral  tablet,disintegrating 03/14/2018 dissolve 1 tablet by oral route 3 times a day for 7 days         Allergy List  Allergen Name Date Reaction Notes   BuSpar --  --  --    Celexa --  --  --    Darvocet-N 100 --  --  --    Elavil --  --  --    ibuprofen --  --  --    Imitrex --  --  --    Paxil --  --  --    SULFA (SULFONAMIDES) --  --  --    tramadol --  --  --    Tylenol-Codeine #3 --  --  --    Vioxx --  --  --    Zoloft --  --  --          Family Medical History  Disease Name Relative/Age Notes   Cancer, Unspecified / --    Diabetes / --    Heart Disease / --    Hypertension / --    Stroke / --          Social History  Finding Status Start/Stop Quantity Notes   Alcohol Never --/-- --  03/14/2018 -    Former smoker --  --/-- --  03/14/2018 -    Tobacco Former --/-- --  FORMER SMOKER         Immunizations  NameDate Admin Mfg Trade Name Lot Number Route Inj VIS Given VIS Publication   Phofcnrxh10/17/2018 PMC Fluzone High-Dose [id number]  09/17/2018 08/07/2015   Comments: ndc [id number]         Vitals  Date Time BP Position Site L\R Cuff Size HR RR TEMP(F) WT  HT  BMI kg/m2 BSA m2 O2 Sat        10/19/2018 10:33 /60 Sitting    119 - R  99.2 109lbs 0oz    96 %           Physical Examination  · Constitutional  o Appearance  o : well developed, losing weight, chronically ill  · Head and Face  o HEENT  o : lips dry  · Eyes  o Conjunctivae  o : conjunctivae normal  · Neck  o Inspection/Palpation  o : supple  o Thyroid  o : no thyromegaly  · Respiratory  o Respiratory Effort  o : breathing unlabored  o Auscultation of Lungs  o : clear to ascultation  · Cardiovascular  o Heart  o :   § Auscultation of Heart  § : mild tachycardia  o Peripheral Vascular System  o :   § Extremities  § : no edema  · Gastrointestinal  o Abdominal Examination  o :   § Abdomen  § : soft, diffusely tender  · Lymphatic  o Neck  o : no lymphadenopathy present  · Musculoskeletal  o General  o :   § General Musculoskeletal  § : slightly weak and  tremulous some tremor  · Skin and Subcutaneous Tissue  o General Inspection  o : slightly pale  · Neurologic  o Gait and Station  o :   § Gait Screening  § : normal gait  · Psychiatric  o Mood and Affect  o : mood normal, affect appropriate          Assessment  · Abdominal pain     789.00/R10.9  · Abdominal aortic aneurysm     441.4/I71.4  · Weight loss     783.21/R63.4  · Constipation     564.00/K59.00      Plan  · Orders  o CBC with Auto Diff [facility] (28831) - 783.21/R63.4 - 10/19/2018  o CMP [facility] ([id number]) - 441.4/I71.4, 783.21/R63.4 - 10/19/2018  o Thyroid Profile (THYII) - 783.21/R63.4 - 10/19/2018  o Urine Drug Screen ([facility]) (16853) - - 10/19/2018  o ACO-13: Fall Risk Screening with 2 or more falls in past year or any fall with injury in the past year (1100F) - - 10/19/2018  o ACO-14: Influenza immunization administered or previously received () - - 10/19/2018  o ACO-15: Pneumococcal Vaccine Administered or Previously Received (4040F) - - 10/19/2018  o ACO-20: Screening Mammography documented and reviewed (3014F) - - 10/19/2018  o ACO-39: Current medications updated and reviewed () - - 10/19/2018  o VASCULAR SURGERY CONSULTATION (VASCU) - 441.4/I71.4 - 10/19/2018   She wants to go to the woman at [facility] Dr. Couch hasn't done anything and it is holding up getting the colonoscopy and she keeps losing weight gradually.  o GASTROENTEROLOGY (GASTR) - 783.21/R63.4 - 10/19/2018   is losing weight needs colonoscopy has diarrhea or constipation She also had an abdominal aortic aneurysm 4.8 cm  · Instructions  o Instructed to seek medical attention urgently for new or worsening symptoms.  o Patient was educated/instructed on their diagnosis, treatment and medications prior to discharge from the clinic today.            Electronically Signed by: Traci Jensen MD -Author on October 19, 2018 05:46:16 PM

## 2021-05-07 NOTE — PROGRESS NOTES
Progress Note      Patient Name: Bessy Leggett   Patient ID: [id number]   Sex: Female   YOB: 1950    Primary Care Provider: Traci Jensen MD   Referring Provider: Traci Jensen MD    Visit Date: July 6, 2018    Provider: Traci Jensen MD   Location: [facility]   Location Address: [address] [place] [address]  [id number]   Location Phone: [phone]          Chief Complaint     FOLLOW UP ON MEDS   DAUGHTER IS IN TREATMENT FOR CANCER AND SHE IS REALLY NERVOUS       History Of Present Illness  Bessy Leggett is a 68 year old /White female who presents for evaluation and treatment of:      Her daughter just found out and told her last night about her daughter's ca.  She is in [facility].  She has been eating a lot because she eats when she is nervous but she is not gaining weight.  She has another daughter that was in [facility] that has a growth on her GB .  Both daughters have been asking for her.  So far she hasn't gone out of her head but she has cried.  She is not able to sleep but she is eating a lot-beans, cornbread and brownies.  Her BP  is high.  She skipped the water pill today and yesterday.  She usually takes it every other day.  She will take it when she goes home  She was referred to psychiatry by Dr Jeffrey , neurology but she hasn't made that appt.  She is taking the Ativan that Nati prescribed and has several of those pills remaining in her bottle.    One day last month she was nauseated and vomited and she thinks the [facility] did it ,  It made her feel like her dreams were real,  She is afraid to take them. She hasn' had them for a month. This is a familiar story  She has a headache that is smashing.   It's on the R side and affects her vision.  Headaches come daily since the stress.  for a month.     She never had the colonoscopy because she went out of her head  She says there will be a hydrocodone in her system because Pat gave daina  one so that shr could get here.       Past Medical History  Disease Name Date Onset Notes   Arthritis --  --    Asthma --  --    Cataract --  --    COPD (chronic obstructive pulmonary disease) --  --    Diabetes --  --    Hyperlipidemia --  --    Hypertension --  --    IBS (irritable bowel syndrome) --  --    Reflux --  --    Rheumatoid arthritis --  --    Seizure --  --          Past Surgical History  Procedure Name Date Notes   Gallbladder --  --    Hysterectomy --  --          Medication List  Name Date Started Instructions   amlodipine 10 mg oral tablet 12/12/2017 take 1 tablet (10 mg) by oral route once daily for 90 days   Anoro Ellipta 62.5-25 mcg/actuation inhalation blister with device 06/18/2018 inhale 1 puff by inhalation route once daily at the same time each day for 30 days   Ativan 0.5 mg oral tablet 06/07/2018 take 1 tablet (0.5 mg) by oral route 3 times per day as needed for 30 days   hydrochlorothiazide 12.5 mg oral capsule 12/12/2017 take 1 capsule (12.5 mg) by oral route once daily for 30 days   Keppra 500 mg oral tablet 03/14/2018 take 1 tablet (500 mg) by oral route 2 times per day for 30 days   levalbuterol HCl 0.31 mg/3 mL inhalation solution for nebulization 12/12/2017 use in nebulizer as directed 3 times a day for 30 days   lisinopril 40 mg oral tablet 12/12/2017 take 1 tablet (40 mg) by oral route once daily for 30 days   omeprazole 20 mg oral capsule,delayed release(DR/EC) 06/08/2018 take 1 capsule by mouth once daily   omeprazole 40 mg oral capsule,delayed release(DR/EC) 06/18/2018 take 1 capsule by oral route daily for 30 days   pravastatin 40 mg oral tablet 12/12/2017 take 1 tablet (40 mg) by oral route once daily at bedtime for 30 days   Ventolin HFA 90 mcg/actuation inhalation HFA aerosol inhaler 03/28/2018 inhale 1 - 2 puffs (90 - 180 mcg) by inhalation route every 4-6 hours as needed for 30 days   Viibryd 10 mg oral tablet 12/12/2017 take 1 tablet by oral route daily for 30 days    Vitamin B-12 1,000 mcg/mL injection solution 03/14/2018 inject 1 milliliter (1,000 mcg) by subcutaneous route once a month for 30 days   Zofran ODT 4 mg oral tablet,disintegrating 03/14/2018 dissolve 1 tablet by oral route 3 times a day for 7 days         Allergy List  Allergen Name Date Reaction Notes   BuSpar --  --  --    Celexa --  --  --    Darvocet-N 100 --  --  --    Elavil --  --  --    ibuprofen --  --  --    Imitrex --  --  --    Paxil --  --  --    SULFA (SULFONAMIDES) --  --  --    tramadol --  --  --    Tylenol-Codeine #3 --  --  --    Vioxx --  --  --    Zoloft --  --  --          Family Medical History  Disease Name Relative/Age Notes   Cancer, Unspecified / --    Diabetes / --    Heart Disease / --    Hypertension / --    Stroke / --          Social History  Finding Status Start/Stop Quantity Notes   Alcohol Never --/-- --  03/14/2018 -    Former smoker --  --/-- --  03/14/2018 -    Tobacco --  --/-- --  FORMER SMOKER         Vitals  Date Time BP Position Site L\R Cuff Size HR RR TEMP(F) WT  HT  BMI kg/m2 BSA m2 O2 Sat HC       07/06/2018 09:15 /118 Sitting    110 - R  98.6 118lbs 0oz    98 %           Physical Examination  · Constitutional  o Appearance  o : well developed, well-nourished, in no acute distress She is giving her history pretty well. Pale  · Head and Face  o HEENT  o : lips are dry.  · Eyes  o Conjunctivae  o : conjunctivae normal  · Neck  o Inspection/Palpation  o : supple  o Thyroid  o : no thyromegaly  · Respiratory  o Respiratory Effort  o : breathing unlabored  o Auscultation of Lungs  o : clear to ascultation  · Cardiovascular  o Heart  o :   § Auscultation of Heart  § : regular rate and rhythm  o Peripheral Vascular System  o :   § Extremities  § : no edema  · Lymphatic  o Neck  o : no lymphadenopathy present  · Musculoskeletal  o General  o :   § General Musculoskeletal  § : normal.  · Skin and Subcutaneous Tissue  o General Inspection  o : ok  · Neurologic  o Gait and  Station  o :   § Gait Screening  § : normal gait somewhat tremulous  · Psychiatric  o Mood and Affect  o : mood normal, affect appropriate          Assessment  · Anxiety disorder     300.00/F41.9  · Diabetes mellitus, type 2     250.00/E11.9  · Essential hypertension     401.9/I10  · Headache     784.0/R51  · Hyperlipidemia     272.4/E78.5  · Weight loss, non-intentional     783.21/R63.4      Plan  · Orders  o CBC with Auto Diff [facility] ([id number]) - - 07/06/2018  o CMP [facility] ([id number]) - 783.21/R63.4 - 07/06/2018  o Hgb A1c [facility] ([id number]) - - 07/06/2018  o Lipid Panel [facility] ([id number]) - 272.4/E78.5 - 07/06/2018  o Thyroid Profile (THYII) - - 07/06/2018  o Urine Drug Screen ([facility]) ([id number]) - - 07/06/2018  o ACO-39: Current medications updated and reviewed () - - 07/06/2018  o COLONOSCOPY REFERRAL (COLON) - - 07/06/2018  · Medications  o Ativan 0.5 mg oral tablet   SIG: take 1 tablet (0.5 mg) by oral route 3 times per day as needed for 30 days   DISP: (90) tablets with 0 refills  Adjusted on 07/06/2018     · Instructions  o Advised that cheeses and other sources of dairy fats, animal fats, fast food, and the extras (candy, pasteries, pies, doughnuts and cookies) all contain LDL raising nutrients. Advised to increase fruits, vegetables, whole grains, and to monitor portion sizes.   o Patient was educated/instructed on their diagnosis, treatment and medications prior to discharge from the clinic today.            Electronically Signed by: Traci Jensen MD -Author on July 6, 2018 07:34:07 PM

## 2021-05-07 NOTE — PROGRESS NOTES
Progress Note      Patient Name: Bessy Leggett   Patient ID: [id number]   Sex: Female   YOB: 1950    Primary Care Provider: Traci Jensen MD   Referring Provider: Traci Jensen MD    Visit Date: January 29, 2019    Provider: Traci Jensen MD   Location: [facility]   Location Address: [address] [place] [address]  [id number]   Location Phone: [phone]          Chief Complaint     Refills and needs sore on chest checked before surgery       History Of Present Illness  Bessy Leggett is a 68 year old /White female who presents for evaluation and treatment of:      She has been eating better and has gained some weight.  She has seen Dr. Couch who plans to AAA repair.  She has an appointment with Dr. Campos for cardiac clearance.  They understand there is risk for kidney damage.  She understands her part is to eat as healthy as she can.    Dr. Couch wanted us to treat the chest pustule.  He thought it needed to be drained but it opened on its own.  She has been taking the Cipro he prescribed.  She is having more migraines.  They are lasting 2-3 days.  She is losing vision when it happens.  She vomits and can't see her target.  She thinks the migraines are more frequent since she is stressing over the surgery.  She is a good candidate for Aimovig.  She has a lot of pain in the R hip and she feels like there is a tender area where she sits.  Her BP med was not decreased  Once again they are requesting higher doses of anxiety meds.  She complains of her heart pounding.  She has worried about the surgery.  She has had second thoughts about surgery.  She worries about the grandchildren if something happens to her.  She also requests Bupap for her headaches another controlled substance.          Past Medical History  Disease Name Date Onset Notes   Arthritis --  --    Asthma --  --    Cataract --  --    COPD (chronic obstructive pulmonary disease) --   --    Diabetes --  --    Hyperlipidemia --  --    Hypertension --  --    IBS (irritable bowel syndrome) --  --    Reflux --  --    Rheumatoid arthritis --  --    Seizure --  --          Past Surgical History  Procedure Name Date Notes   Gallbladder --  --    Hysterectomy --  --          Medication List  Name Date Started Instructions   amlodipine 10 mg oral tablet 01/15/2019 take 1 tablet by mouth once daily   Anoro Ellipta 62.5-25 mcg/actuation inhalation blister with device 12/11/2018 inhale 1 puff by inhalation route once daily at the same time each day for 30 days   hydrochlorothiazide 12.5 mg oral capsule 11/13/2018 take 1 capsule by mouth once daily   Keppra 500 mg oral tablet 03/14/2018 take 1 tablet (500 mg) by oral route 2 times per day for 30 days   levalbuterol HCl 0.31 mg/3 mL inhalation solution for nebulization 12/12/2017 use in nebulizer as directed 3 times a day for 30 days   lisinopril 20 mg oral tablet 12/14/2018 take 1 tablet (20 mg) by oral route once daily for 30 days   omeprazole 20 mg oral capsule,delayed release(DR/EC) 12/04/2018 take 1 capsule by mouth once daily   omeprazole 40 mg oral capsule,delayed release(DR/EC) 06/18/2018 take 1 capsule by oral route daily for 30 days   pravastatin 40 mg oral tablet 01/22/2019 take 1 tablet by mouth at bedtime   Ventolin HFA 90 mcg/actuation inhalation HFA aerosol inhaler 03/28/2018 inhale 1 - 2 puffs (90 - 180 mcg) by inhalation route every 4-6 hours as needed for 30 days   Vitamin B-12 1,000 mcg/mL injection solution 03/14/2018 inject 1 milliliter (1,000 mcg) by subcutaneous route once a month for 30 days   Xanax 0.5 mg oral tablet 12/17/2018 take 1 tablet (0.5 mg) by oral route 3 times per day for 30 days   Zofran ODT 4 mg oral tablet,disintegrating 03/14/2018 dissolve 1 tablet by oral route 3 times a day for 7 days         Allergy List  Allergen Name Date Reaction Notes   BuSpar --  --  --    Celexa --  --  --    Darvocet-N 100 --  --  --    Elavil  --  --  --    ibuprofen --  --  --    Imitrex --  --  --    Paxil --  --  --    SULFA (SULFONAMIDES) --  --  --    tramadol --  --  --    Tylenol-Codeine #3 --  --  --    Vioxx --  --  --    Zoloft --  --  --          Family Medical History  Disease Name Relative/Age Notes   Cancer, Unspecified / --    Diabetes / --    Heart Disease / --    Hypertension / --    Stroke / --          Social History  Finding Status Start/Stop Quantity Notes   Alcohol Never --/-- --  03/14/2018 -    Former smoker --  --/-- --  03/14/2018 -    Tobacco Former --/-- --  FORMER SMOKER         Immunizations  NameDate Admin Mfg Trade Name Lot Number Route Inj VIS Given VIS Publication   [id number]/17/2018 [facility] Fluzone High-Dose [id number] IM  09/17/2018 08/07/2015   Comments: ndc [id number]         Vitals  Date Time BP Position Site L\R Cuff Size HR RR TEMP(F) WT  HT  BMI kg/m2 BSA m2 O2 Sat        01/29/2019 03:12 /90 Sitting    119 - R  98 109lbs 0oz    96 %           Physical Examination  · Constitutional  o Appearance  o : well developed, well-nourished, in no acute distress  · Eyes  o Conjunctivae  o : conjunctivae normal  · Neck  o Inspection/Palpation  o : supple  o Thyroid  o : no thyromegaly  · Respiratory  o Respiratory Effort  o : breathing unlabored  o Auscultation of Lungs  o : clear to ascultation  · Cardiovascular  o Heart  o :   § Auscultation of Heart  § : regular rate and rhythm  o Peripheral Vascular System  o :   § Extremities  § : no edema  · Lymphatic  o Neck  o : no lymphadenopathy present  · Musculoskeletal  o General  o :   § General Musculoskeletal  § : R hip feels like there is a bursae that is bulging posteriorly  · Skin and Subcutaneous Tissue  o General Inspection  o : Between her breasts is a skin lesion that has drainage and the cap is coming off and there is a little oozing. It is slightly bloody  · Neurologic  o Gait and Station  o :   § Gait Screening  § : normal gait  · Psychiatric  o Mood and  Affect  o : mood normal, affect appropriate          Assessment  · Headache     784.0/R51  · Pustule     686.9/L08.9      Plan  · Orders  o ACO-39: Current medications updated and reviewed () - - 01/29/2019  o Aerobic and anaerobic culture with Gram stain ([id number], [id number], [id number]) - 686.9/L08.9 - 01/29/2019  · Medications  o prednisone 20 mg oral tablet   SIG: take 1 tablet (20 mg) by oral route once daily for 7 days   DISP: (7) tablets with 0 refills  Prescribed on 01/29/2019     o Vistaril 25 mg oral capsule   SIG: take 1 capsule (25 mg) by oral route 3 times per day for 30 days   DISP: (90) capsules with 2 refills  Prescribed on 01/29/2019     · Instructions  o Patient was educated/instructed on their diagnosis, treatment and medications prior to discharge from the clinic today.     Aimovig SQ             Electronically Signed by: Traci Jensen MD -Author on January 29, 2019 05:49:02 PM

## 2021-05-07 NOTE — PROGRESS NOTES
Progress Note      Patient Name: Bessy Leggett   Patient ID: [id number]   Sex: Female   YOB: 1950    Primary Care Provider: Traci Jensen MD   Referring Provider: Traci Jensen MD    Visit Date: March 5, 2018    Provider: Traci Jensen MD   Location: [facility]   Location Address: [address] [place] [address]  [id number]   Location Phone: [phone]          Chief Complaint  · Follow up office visit within 14 calender days of discharge from inpatient status (moderate complexity).      History Of Present Illness  FOLLOW UP OFFICE VISIT WITHIN 14 CALENDER DAYS OF INPATIENT STATUS (MODERATE COMPLEXITY)  Bessy Leggett presents to office for follow up post discharge from inpatient status within 14 calender days. Patient was contacted within 2 business days via phone conversation. Documentation of that phone contact is present in the patient's electronic chart. Patient was admitted to inpatient facility on 02/20/2018 and discharged 02/24/2018 due to: pyelonephritis with delirium.   Admitting MD: justine   Her discharge summary has been reviewed and placed in the patient's electronic chart.   Patient's problem list is: COPD (chronic obstructive pulmonary disease), Diabetes, Hyperlipidemia, Hypertension, and UTI, seizure disorder, peripheral neuropathy,   Patient's medication list has been updated/reconcilled from discharge summary. Medication list is: gabapentin 300 mg oral capsule, hydrochlorothiazide 12.5 mg oral capsule, lisinopril 40 mg oral tablet, omeprazole 40 mg oral capsule,delayed release(DR/EC), pravastatin 40 mg oral tablet, Ventolin HFA 90 mcg/actuation inhalation HFA aerosol inhaler, Vitamin B-12 1,000 mcg/mL injection solution, and keppra 500mg BID, Duo-neb per nebulizer   Bessy Leggett is a 68 year old /White female who presents for evaluation and treatment of:      She is feeling nervous.  She cannot get out of the house unless  she has something for panic attacks.  She starts shaking when she gets around people.    She saw a urologist while she was in the hospital.  She has had several UTI's that have caused confusion.    She has been followed by Dr. Couch but would like to transfer care to the vascular doctor at [facility].  She needs to see Dr. Sosa for follow up of the seizures.    She had low potassium while she was in the hospital.    She isn't doing much at home.  It is painful to get up out of her bed.    She hasn't gotten any sleep.  She is struggling to get out of the tub.  Everything she does takes a long time.  She mentions the pain pills twice.  She was told that she would need to get a doctor that understood what is going on with her own body.  She took her last 1/2 on Friday.    She is on Xanax .  She took Ativan while in the hospital.  She has agoraphobia.  She is not taking the Viibyrd.       Past Medical History  Disease Name Date Onset Notes   Arthritis --  --    Asthma --  --    Cataract --  --    COPD (chronic obstructive pulmonary disease) --  --    Diabetes --  --    Hyperlipidemia --  --    Hypertension --  --    IBS (irritable bowel syndrome) --  --    Reflux --  --    Rheumatoid arthritis --  --    Seizure --  --          Past Surgical History  Procedure Name Date Notes   Gallbladder --  --    Hysterectomy --  --          Medication List  Name Date Started Instructions   alprazolam 1 mg oral tablet 12/12/2017 take 1 tabelt twice daliy   amlodipine 10 mg oral tablet 12/12/2017 take 1 tablet (10 mg) by oral route once daily for 90 days   Anoro Ellipta 62.5-25 mcg/actuation inhalation blister with device 12/12/2017 inhale 1 puff by inhalation route once daily at the same time each day for 30 days   gabapentin 300 mg oral capsule 12/14/2017 take 1 capsule (300 mg) by oral route 3 times per day for 30 days   hydrochlorothiazide 12.5 mg oral capsule 12/12/2017 take 1 capsule (12.5 mg) by oral route once daily for 30 days    levalbuterol HCl 0.31 mg/3 mL inhalation solution for nebulization 12/12/2017 use in nebulizer as directed 3 times a day for 30 days   lisinopril 40 mg oral tablet 12/12/2017 take 1 tablet (40 mg) by oral route once daily for 30 days   Macrodantin 100 mg oral capsule 12/12/2017 take 1 capsule by oral route 2 times a day for 7 days   omeprazole 40 mg oral capsule,delayed release(DR/EC) 12/12/2017 take 1 capsule by oral route daily for 30 days   pravastatin 40 mg oral tablet 12/12/2017 take 1 tablet (40 mg) by oral route once daily at bedtime for 30 days   Ventolin HFA 90 mcg/actuation inhalation HFA aerosol inhaler 12/12/2017 inhale 1 - 2 puffs (90 - 180 mcg) by inhalation route every 4-6 hours as needed for 30 days   Viibryd 10 mg oral tablet 12/12/2017 take 1 tablet by oral route daily for 30 days   Vitamin B-12 1,000 mcg/mL injection solution  inject 1 milliliter (1,000 mcg) by subcutaneous route once a month         Allergy List  Allergen Name Date Reaction Notes   BuSpar --  --  --    Celexa --  --  --    Darvocet-N 100 --  --  --    Elavil --  --  --    ibuprofen --  --  --    Imitrex --  --  --    Paxil --  --  --    SULFA (SULFONAMIDES) --  --  --    tramadol --  --  --    Tylenol-Codeine #3 --  --  --    Vioxx --  --  --    Zoloft --  --  --          Family Medical History  Disease Name Relative/Age Notes   Cancer, Unspecified / --    Diabetes / --    Heart Disease / --    Hypertension / --    Stroke / --          Social History  Finding Status Start/Stop Quantity Notes   Former smoker --  --/-- --  --          Review of Systems  · Constitutional  o Denies  o : fever, weight gain, weight loss, malaise/fatigue  · Eyes  o Denies  o : diplopia, recent changes, blurred vision, redness of eye, eye pain, discharge from eye  · HENT  o Denies  o : sore throat, hearing changes, tinnitus, nose bleeding, sinus pain, nasal discharge, ear pain  · Cardiovascular  o Denies  o : palpitation, chest pain, claudication,  lower extremity edema  · Respiratory  o Denies  o : shortness of breath, MCARTHUR, PND/Orthopnea, hemoptysis, dry cough, productive cough  · Gastrointestinal  o Denies  o : nausea, vomiting, reflux, diarrhea, constipation, abdominal pain, blood in stools  · Neurologic  o Denies  o : unsteady gait, weakness, dizziness, H/A  · Musculoskeletal  o Denies  o : myalgias, joint pain, joint swelling  · Psychiatric  o Denies  o : suicidal ideation, homicidal ideation, mood changes, hallucinations, memory  · Heme-Lymph  o Denies  o : easy bleeding, easy bruising, edema, lymph node enlargement or tenderness  · Allergic-Immunologic  o Denies  o : bees, frequent illnesses, seasonal allergies      Vitals  Date Time BP Position Site L\R Cuff Size HR RR TEMP(F) WT  HT  BMI kg/m2 BSA m2 O2 Sat HC       03/05/2018 03:22 /60 Sitting    114 - R  97.4 127lbs 0oz    99 %           Physical Examination  · Constitutional  o Appearance  o : chronically ill and feeble appearing with some confusion  · Head and Face  o HEENT  o : R TM is red like it would be from a hard cough Mouth is dry  · Eyes  o Conjunctivae  o : conjunctivae normal  · Neck  o Inspection/Palpation  o : supple  o Thyroid  o : no thyromegaly  · Respiratory  o Respiratory Effort  o : breathing unlabored  o Auscultation of Lungs  o : clear to ascultation  · Cardiovascular  o Heart  o :   § Auscultation of Heart  § : regular rate and rhythm  o Peripheral Vascular System  o :   § Extremities  § : no edema  · Lymphatic  o Neck  o : no lymphadenopathy present  · Musculoskeletal  o General  o :   § General Musculoskeletal  § : Holds her muscles stiffly.  · Skin and Subcutaneous Tissue  o General Inspection  o : her skin is dry and there is a little tenting Her nails are yellow colored She has several bruises on her forearms  · Neurologic  o Gait and Station  o :   § Gait Screening  § : Gait is very unsteady. It is wide based and unsteady. She is slow getting out of her  chair  · Psychiatric  o Mood and Affect  o : anxious, confused-gets part of the story correctly          Assessment  · UTI (urinary tract infection)     599.0/N39.0  · Anxiety disorder     300.00/F41.9  · Urinary tract infection     599.0/N39.0  · Hypokalemia     276.8/E87.6      Plan  · Orders  o Discharge medications reconciled with the current medication list (1111F) - - 03/05/2018  o CMP [facility] ([id number]) - - 03/05/2018  o IOP - Urinalysis without Microscopy (Clinitek) [facility] ([id number]) - 599.0/N39.0 - 03/05/2018   glu- neg alyssa- neg ket- neg sg- 1.015 blo- neg ph- 5.5 pro- neg uro- .2 nit- neg kiara- neg   o ACO-39: Current medications updated and reviewed () - - 03/05/2018  · Medications  o alprazolam 0.5 mg oral tablet   SIG: take 1 tablet (0.5 mg) by oral route 3 times per day for 30 days   DISP: (90) tablets with 0 refills  Adjusted on 03/05/2018     · Instructions  o Patient discharge summary has been reviewed and placed in patient's electronic medical record.  o Patient received a phone call from my office within 2 business days of discharge from inpatient status, and documented within the patient's chart.  o Also patient was seen (face to face) for follow up evaluation within 14 calender days of discharge from inpatient status for a severe complexity issue.  o Patient was educated on their diagnosis, treatment and any medication changes while being evaluated today.  o Patient was educated/instructed on their diagnosis, treatment and medications prior to discharge from the clinic today.  o Once before I recommended weaning from all controlled substances. I think that is where we are again. I suspect this is the biggest part of her problem. I want to give Xanax 0.5mg #90 1 po TID this month with plan to drop to less in 1 month. She eventually needs off these medications completely            Electronically Signed by: Traci Jensen MD -Author on March 5, 2018 06:31:23 PM

## 2021-05-07 NOTE — PROGRESS NOTES
Progress Note      Patient Name: Bessy Leggett   Patient ID: [id number]   Sex: Female   YOB: 1950    Primary Care Provider: Traci Jensen MD   Referring Provider: Traci Jensen MD    Visit Date: April 12, 2019    Provider: Traci Jensen MD   Location: [facility]   Location Address: [address] [place] [address]  [id number]   Location Phone: [phone]          Chief Complaint     due medication refills, possibly labs.       History Of Present Illness  Bessy Leggett is a 69 year old /White female who presents for evaluation and treatment of:      She was admitted to [facility] when she was having visual and auditory hallucinations. She is taking every thing they gave her except the HCTZ which she felt drained her.  The BP is actually controlled without it.  She does not have any edema.    She is to have surgery for her abdominal aneurysm.    She was hospitalized 3/30-4/2/19.    She claims to eat well except sometimes she is nauseous.    She needs a refill on Ventolin and she will be out of the Xanax in 7 days.  She has scripts for Haldol and Risperdal.  She has made her Xanax last.  She wants a referral to podiatry because of her toenails bothering her.       Past Medical History  Disease Name Date Onset Notes   Arthritis --  --    Asthma --  --    Cataract --  --    COPD (chronic obstructive pulmonary disease) --  --    Diabetes --  --    Hyperlipidemia --  --    Hypertension --  --    IBS (irritable bowel syndrome) --  --    Reflux --  --    Rheumatoid arthritis --  --    Seizure --  --          Past Surgical History  Procedure Name Date Notes   Gallbladder --  --    Hysterectomy --  --          Medication List  Name Date Started Instructions   amlodipine 10 mg oral tablet 01/15/2019 take 1 tablet by mouth once daily   Anoro Ellipta 62.5-25 mcg/actuation inhalation blister with device 12/11/2018 inhale 1 puff by inhalation route once  daily at the same time each day for 30 days   Keppra 500 mg oral tablet 03/14/2018 take 1 tablet (500 mg) by oral route 2 times per day for 30 days   levalbuterol HCl 0.31 mg/3 mL inhalation solution for nebulization 12/12/2017 use in nebulizer as directed 3 times a day for 30 days   lisinopril 20 mg oral tablet 12/14/2018 take 1 tablet (20 mg) by oral route once daily for 30 days   omeprazole 20 mg oral capsule,delayed release(DR/EC) 12/04/2018 take 1 capsule by mouth once daily   pravastatin 40 mg oral tablet 01/22/2019 take 1 tablet by mouth at bedtime   Ventolin HFA 90 mcg/actuation inhalation HFA aerosol inhaler 04/12/2019 inhale 1 - 2 puffs (90 - 180 mcg) by inhalation route every 4-6 hours as needed for 30 days   Vistaril 25 mg oral capsule 01/29/2019 take 1 capsule (25 mg) by oral route 3 times per day for 30 days   Xanax 0.5 mg oral tablet 04/12/2019 take 1 tablet (0.5 mg) by oral route 3 times per day for 30 days         Allergy List  Allergen Name Date Reaction Notes   BuSpar --  --  --    Celexa --  --  --    Darvocet-N 100 --  --  --    Elavil --  --  --    ibuprofen --  --  --    Imitrex --  --  --    Paxil --  --  --    SULFA (SULFONAMIDES) --  --  --    tramadol --  --  --    Tylenol-Codeine #3 --  --  --    Vioxx --  --  --    Zoloft --  --  --          Family Medical History  Disease Name Relative/Age Notes   Stroke  --    Heart Disease  --    Hypertension  --    Cancer, Unspecified  --    Diabetes  --          Social History  Finding Status Start/Stop Quantity Notes   Alcohol Never --/-- --  03/14/2018 -    Former smoker --  --/-- --  03/14/2018 -    Tobacco Former --/-- --  FORMER SMOKER         Immunizations  NameDate Admin Mfg Trade Name Lot Number Route Inj VIS Given VIS Publication   Dtkyilrtq04/17/2018 [facility] FLUZONE-HIGH DOSE [id number] IM  09/17/2018 08/07/2015   Comments: ndc [id number]         Vitals  Date Time BP Position Site L\R Cuff Size HR RR TEMP (F) WT  HT  BMI kg/m2 BSA m2 O2 Sat HC   "     04/12/2019 09:22 /86 Sitting    110 - R  97.8 104lbs 6oz 5'  3\" 18.49 1.45 93 %          Physical Examination  · Constitutional  o Appearance  o : well developed, well-nourished, is having some difficulty with giving her history  · Head and Face  o HEENT  o : lips are dry  · Eyes  o Conjunctivae  o : conjunctivae normal  · Neck  o Thyroid  o : no thyromegaly  · Respiratory  o Respiratory Effort  o : breathing unlabored  o Auscultation of Lungs  o : clear to ascultation  · Cardiovascular  o Heart  o :   § Auscultation of Heart  § : regular rate and rhythm  o Peripheral Vascular System  o :   § Extremities  § : no edema  · Gastrointestinal  o Abdominal Examination  o :   § Abdomen  § : soft, non-tender  · Musculoskeletal  o General  o :   § General Musculoskeletal  § : development grossly normal.  · Skin and Subcutaneous Tissue  o General Inspection  o : normal  · Neurologic  o Gait and Station  o :   § Gait Screening  § : normal gait  · Psychiatric  o Mood and Affect  o : mood normal, affect appropriate She smiles occasionally She is a little anxious and struggles with telling her story           Assessment  · Anxiety disorder     300.00/F41.9  · Hypertension     401.9/I10  · Toenail deformity     703.9/L60.8      Plan  · Orders  o ACO-39: Current medications updated and reviewed () - - 04/12/2019  o PODIATRY CONSULTATION (PODIA) - 703.9/L60.8 - 04/12/2019  · Medications  o Ventolin HFA 90 mcg/actuation inhalation HFA aerosol inhaler   SIG: inhale 1 - 2 puffs (90 - 180 mcg) by inhalation route every 4-6 hours as needed for 30 days   DISP: (18) Gram with 2 refills  Adjusted on 04/12/2019     o Xanax 0.5 mg oral tablet   SIG: take 1 tablet (0.5 mg) by oral route 3 times per day for 30 days   DISP: (90) tablets with 1 refills  Adjusted on 04/12/2019     · Instructions  o Patient was educated/instructed on their diagnosis, treatment and medications prior to discharge from the clinic " today.            Electronically Signed by: Traci Jensen MD -Author on April 12, 2019 10:12:59 AM

## 2021-05-07 NOTE — PROGRESS NOTES
Progress Note      Patient Name: Bessy Leggett   Patient ID: [id number]   Sex: Female   YOB: 1950    Primary Care Provider: Traci Jensen MD   Referring Provider: Traci Jensen MD    Visit Date: September 17, 2018    Provider: Traci Jensen MD   Location: [facility]   Location Address: [address] [place] [address]  [id number]   Location Phone: [phone]          Chief Complaint     Follow up for refills and weight loss       History Of Present Illness  Bessy Leggett is a 68 year old /White female who presents for evaluation and treatment of:      She has been out of her medication for 1 month.  She has been shaky.  Last week she was vomiting and diarrhea and sneezing.  She was weak.  She was achy like the flu. She has had some chills.  She couldn't go see Dr. Sosa last week.  She re-scheduled  She takes the Ativan TID  She hasn't had diarrhea this week.  She has been using BOOST.  She has a daughter that has cancer and she seemed to make her anxiety worse.  She is not vomiting now.  Her abdominal aortic aneurysm is 4.8 cm and she needs to go back to Dr. Couch.  He has gone to [place]       Past Medical History  Disease Name Date Onset Notes   Arthritis --  --    Asthma --  --    Cataract --  --    COPD (chronic obstructive pulmonary disease) --  --    Diabetes --  --    Hyperlipidemia --  --    Hypertension --  --    IBS (irritable bowel syndrome) --  --    Reflux --  --    Rheumatoid arthritis --  --    Seizure --  --          Past Surgical History  Procedure Name Date Notes   Gallbladder --  --    Hysterectomy --  --          Medication List  Name Date Started Instructions   amlodipine 10 mg oral tablet 07/20/2018 take 1 tablet by mouth once daily   Anoro Ellipta 62.5-25 mcg/actuation inhalation blister with device 06/18/2018 inhale 1 puff by inhalation route once daily at the same time each day for 30 days   Ativan 0.5 mg oral tablet  07/06/2018 take 1 tablet (0.5 mg) by oral route 3 times per day as needed for 30 days   hydrochlorothiazide 12.5 mg oral capsule 12/12/2017 take 1 capsule (12.5 mg) by oral route once daily for 30 days   Keppra 500 mg oral tablet 03/14/2018 take 1 tablet (500 mg) by oral route 2 times per day for 30 days   levalbuterol HCl 0.31 mg/3 mL inhalation solution for nebulization 12/12/2017 use in nebulizer as directed 3 times a day for 30 days   lisinopril 40 mg oral tablet 12/12/2017 take 1 tablet (40 mg) by oral route once daily for 30 days   omeprazole 20 mg oral capsule,delayed release(DR/EC) 06/08/2018 take 1 capsule by mouth once daily   omeprazole 40 mg oral capsule,delayed release(DR/EC) 06/18/2018 take 1 capsule by oral route daily for 30 days   pravastatin 40 mg oral tablet 07/24/2018 take 1 tablet by mouth at bedtime   Ventolin HFA 90 mcg/actuation inhalation HFA aerosol inhaler 03/28/2018 inhale 1 - 2 puffs (90 - 180 mcg) by inhalation route every 4-6 hours as needed for 30 days   Viibryd 10 mg oral tablet 12/12/2017 take 1 tablet by oral route daily for 30 days   Vitamin B-12 1,000 mcg/mL injection solution 03/14/2018 inject 1 milliliter (1,000 mcg) by subcutaneous route once a month for 30 days   Zofran ODT 4 mg oral tablet,disintegrating 03/14/2018 dissolve 1 tablet by oral route 3 times a day for 7 days         Allergy List  Allergen Name Date Reaction Notes   BuSpar --  --  --    Celexa --  --  --    Darvocet-N 100 --  --  --    Elavil --  --  --    ibuprofen --  --  --    Imitrex --  --  --    Paxil --  --  --    SULFA (SULFONAMIDES) --  --  --    tramadol --  --  --    Tylenol-Codeine #3 --  --  --    Vioxx --  --  --    Zoloft --  --  --          Family Medical History  Disease Name Relative/Age Notes   Cancer, Unspecified / --    Diabetes / --    Heart Disease / --    Hypertension / --    Stroke / --          Social History  Finding Status Start/Stop Quantity Notes   Alcohol Never --/-- --  03/14/2018  -    Former smoker --  --/-- --  03/14/2018 -    Tobacco --  --/-- --  FORMER SMOKER         Vitals  Date Time BP Position Site L\R Cuff Size HR RR TEMP(F) WT  HT  BMI kg/m2 BSA m2 O2 Sat HC       09/17/2018 01:14 /80 Sitting    112 - R  98.4 111lbs 0oz    98 %           Physical Examination  · Constitutional  o Appearance  o : well developed, well-nourished, in no acute distress  · Eyes  o Conjunctivae  o : conjunctivae normal  · Neck  o Inspection/Palpation  o : supple  o Thyroid  o : no thyromegaly  · Respiratory  o Respiratory Effort  o : breathing unlabored  o Auscultation of Lungs  o : clear to ascultation  · Cardiovascular  o Heart  o :   § Auscultation of Heart  § : regular rate and rhythm  o Peripheral Vascular System  o :   § Extremities  § : no edema  · Lymphatic  o Neck  o : no lymphadenopathy present  · Musculoskeletal  o General  o :   § General Musculoskeletal  § : grossly normal with generalized weakness  · Skin and Subcutaneous Tissue  o General Inspection  o : no lesions present, no areas of discoloration, skin turgor normal, texture normal  · Neurologic  o Gait and Station  o :   § Gait Screening  § : normal gait  · Psychiatric  o Mood and Affect  o : mood normal, affect appropriate, anxious          Assessment  · Need for influenza vaccination     V04.81/Z23  · Visit for screening mammogram     V76.12/Z12.31  · Anxiety disorder     300.00/F41.9  · Weight loss     783.21/R63.4  · Aortic aneurysm     441.9/I71.9  · Screening for colon cancer     V76.51/Z12.11      Plan  · Orders  o Screening Mammogram 2D Bilateral (28348, ) - V76.12/Z12.31 - 09/17/2018  o CBC with Auto Diff H (49762) - - 09/17/2018  o CMP [facility] ([id number]) - - 09/17/2018  o Thyroid Profile (THYII) - - 09/17/2018  o Fluzone High Dose Flu Vaccine (31710) - V04.81/Z23 - 09/17/2018   Vaccine - Influenza; Dose: 0.5; Site: Left Upper Arm; Route: Intramuscular; Date: 09/17/2018 14:06:00; Exp: 03/30/2019; Lot: [id number]; Mfg: [employer]; TradeName: Fluzone High-Dose; Administered By: Paz Wright MA; Comment: ndc [id number]  o ACO-39: Current medications updated and reviewed () - - 09/17/2018  o VASCULAR SURGERY CONSULTATION (VASCU) - 441.9/I71.9 - 09/17/2018   refer to Dr. Couch who is now at [facility]  o Fecal Occult Blood Diagnostic (Send to Lab) [facility] ([id number]) - V76.51/Z12.11 - 09/17/2018  · Medications  o Ativan 0.5 mg oral tablet   SIG: take 1 tablet (0.5 mg) by oral route 3 times per day as needed for 30 days   DISP: (90) tablets with 3 refills  Adjusted on 09/17/2018     · Instructions  o Patient was educated/instructed on their diagnosis, treatment and medications prior to discharge from the clinic today.            Electronically Signed by: Traci Jensen MD -Author on September 17, 2018 04:10:24 PM

## 2021-05-07 NOTE — PROGRESS NOTES
Progress Note      Patient Name: Bessy Leggett   Patient ID: [id number]   Sex: Female   YOB: 1950    Primary Care Provider: Traci Jensen MD   Referring Provider: Erich Joya DO    Visit Date: December 17, 2019    Provider: Erich Joya DO   Location: [facility]   Location Address: [address] [place] [address] [place]  [address]   Location Phone: [phone]          Chief Complaint     Abdominal sxs; Respiratory sxs; Migraines.          History Of Present Illness  Bessy Leggett is a 69 year old /White female who presents for evaluation and treatment of:      1.) Abdominal sxs : Patient presents with 3 months of abdominal symptoms. She reports symptoms noted at her mid-abdominal region extending from her epigastric region to just below above her bladder. Hx of a ventral hernia. She reports an intermittent achy pain, which is sometimes worsened with the ingestion of food. She admits to intermittent nausea and vomiting.  She denies diarrhea. She admits to at least one episode of ?black stools.  She denies night sweats more than usual. She denies fevers or chills. Her most recent colonoscopy was performed in 2018. Denies any history of colon cancer in her family. She does admit to a brother diagnosed with Crohn's disease. She is status post a cholecystectomy. She has an appendix.     2.) Respiratory sxs : Hx of COPD. The patient presents with complaints of months of experiencing difficulty in breathing. She is currently prescribed Anuro and nebulized albuterol treatments PRN. Admits to difficulty with breathing associated with exertion. She also admits to episodes of feeling lightheaded when moving from a sitting to standing position where she sometimes has to stop for a moment.  No recent pulmonary function tests on file. The patient has not been evaluated by pulmonologist in at least 2 years.    3.) Migraines : Patient presents w/ a history of migraines. She  reports that she has suffered from migraines for years. She admits to auras associated with her migraines.  he admits that she was previously prescribed Topamax for prophylactic therapy, which she continues to take but she reports that she continues to experience migraines 2-3 times a week. She is interested in trying a different medication for her symptoms she feels like her migraine symptoms are now debilitating.    4.) Anxiety : Currently prescribed Xanax, which the patient reports 'is not working.'    5.) Medication management : The patient is requesting refills of her medications.       Past Medical History  Disease Name Date Onset Notes   Arthritis --  --    Asthma --  --    Cataract --  --    COPD (chronic obstructive pulmonary disease) --  --    Diabetes --  --    Hyperlipidemia --  --    Hypertension --  --    IBS (irritable bowel syndrome) --  --    Reflux --  --    Rheumatoid arthritis --  --    Seizure --  --          Past Surgical History  Procedure Name Date Notes   Colonoscopy and EGD, with anesthesia 03-02-18 --    Gallbladder --  --    Hysterectomy --  --          Medication List  Name Date Started Instructions   amlodipine 10 mg oral tablet 12/17/2019 take 1 tablet by mouth once daily   Anoro Ellipta 62.5-25 mcg/actuation inhalation blister with device 12/17/2019 inhale 1 puff by inhalation route once daily at the same time each day for 30 days   hydroxyzine pamoate 50 mg oral capsule 12/17/2019 take 1 capsule by oral route BID PRN anxiety   Keppra 500 mg oral tablet 12/17/2019 take 1 tablet (500 mg) by oral route 2 times per day for 30 days   levalbuterol HCl 0.31 mg/3 mL inhalation solution for nebulization 12/17/2019 use in nebulizer as directed 3 times a day for 30 days   lisinopril 20 mg oral tablet 12/17/2019 take 1 tablet (20 mg) by oral route once daily for 90 days   omeprazole 20 mg oral capsule,delayed release(DR/EC) 12/17/2019 take 1 capsule by mouth once daily   pravastatin 40 mg oral  tablet 12/17/2019 take 1 tablet by mouth at bedtime   Remeron 30 mg oral tablet 12/17/2019 take 1 tablet (30 mg) by oral route once daily before bedtime for 30 days   Ventolin HFA 90 mcg/actuation inhalation HFA aerosol inhaler 12/17/2019 inhale 1 - 2 puffs (90 - 180 mcg) by inhalation route every 4-6 hours as needed for 30 days         Allergy List  Allergen Name Date Reaction Notes   BuSpar --  --  --    Celexa --  --  --    Darvocet-N 100 --  --  --    Elavil --  --  --    ibuprofen --  --  --    Imitrex --  --  --    Paxil --  --  --    SULFA (SULFONAMIDES) --  --  --    tramadol --  --  --    Tylenol-Codeine #3 --  --  --    Vioxx --  --  --    Zoloft --  --  --          Family Medical History  Disease Name Relative/Age Notes   Stroke  --    Heart Disease  --    Hypertension  --    Cancer, Unspecified  --    Diabetes  --          Social History  Finding Status Start/Stop Quantity Notes   Alcohol Never --/-- --  03/14/2018 -    Tobacco Former --/-- --  FORMER SMOKER         Immunizations  NameDate Admin Mfg Trade Name Lot Number Route Inj VIS Given VIS Publication   Tcduiwdtz87/17/2019 [facility] FLUZONE-HIGH DOSE [id number]  LD 12/17/2019    Comments: [id number]   Pqhtkqclx78/17/2018 [facility] FLUZONE-HIGH DOSE [id number]   09/17/2018 08/07/2015   Comments: ndc [id number]         Review of Systems  · Constitutional  o Denies  o : fever, chills, night sweats  · Eyes  o Denies  o : discharge from eye, eye discomfort, eye pain  · HENT  o Admits  o : headaches  o Denies  o : lightheadedness, sinus pain, nasal congestion  · Cardiovascular  o Denies  o : chest pain, rapid heart rate, syncope  · Respiratory  o Admits  o : shortness of breath  o Denies  o : abnormal sputum production, hemoptysis, productive cough  · Gastrointestinal  o Admits  o : nausea, vomiting, abdominal pain, ?black stools  · Genitourinary  o Denies  o : urgency, frequency, hematuria  · Integument  o Denies  o : rash, itching, skin  "dryness  · Neurologic  o Denies  o : altered mental status, tingling or numbness, tremors  · Musculoskeletal  o Denies  o : muscle pain, muscular weakness, muscle cramps  · Psychiatric  o Denies  o : hallucinations, delusions, feeling confused  · Heme-Lymph  o Denies  o : petechiae, purpura, pica      Vitals  Date Time BP Position Site L\R Cuff Size HR RR TEMP (F) WT  HT  BMI kg/m2 BSA m2 O2 Sat        12/17/2019 08:30 /90 Sitting    119 - R  97.9 111lbs 8oz 5'  3\" 19.75 1.5 91 %          Physical Examination  · Constitutional  o Appearance  o : thin   · Head and Face  o HEENT  o : Unremarkable  · Eyes  o Conjunctivae  o : conjunctivae normal  · Neck  o Inspection/Palpation  o : supple  · Respiratory  o Respiratory Effort  o : breathing unlabored  o Auscultation of Lungs  o : diminished breath sounds present, bilateral diffuse wheezing appreciated  · Cardiovascular  o Heart  o :   § Auscultation of Heart  § : regular rate and rhythm  o Peripheral Vascular System  o :   § Extremities  § : no edema  · Gastrointestinal  o Abdominal Examination  o :   § Abdomen  § : normoactive bowel sounds x 4; soft; tender with palpation at epigastric region; no overt hernia's appreciated; no masses appreciated  · Musculoskeletal  o General  o :   § General Musculoskeletal  § : No joint swelling or deformity.  · Skin and Subcutaneous Tissue  o General Inspection  o : no rashes present  · Neurologic  o Gait and Station  o :   § Gait Screening  § : normal gait  · Psychiatric  o Mood and Affect  o : mood normal, affect appropriate              Assessment  · Need for influenza vaccination     V04.81/Z23    A.) Administered here in clinic.     · Abdominal pain     789.00/R10.9    A.) Will order occult stool; will order abdominal series to assess for any structural etiology; will prescribe Bentyl PRN abdominal pain as noted below; Zofran PRN nausea as noted below; the patient will return in 2 weeks for a re-evaluation and plan " moving forward.     · COPD (chronic obstructive pulmonary disease)     496/J44.9    A.) No recent PFT noted in chart, will order; patient is already prescribed a combo anti-cholinergic and LABA; will add an inhaled steroid; will also refill Ventolin PRN; will re-evaluate in 2 weeks.     · Diabetes mellitus, type 2     250.00/E11.9    A.) History of diabetes with no recent labs noted; ordered as noted below and will discuss during next visit.    · Migraine     346.90/G43.909    A.) Continue Remeron as the patient is allergic to many abortive medications; cannot start Propranolol for abortive therapy due to COPD; will continue to discuss during upcoming visits.    · Anxiety     300.00/F41.9    A.) Patient reports no control with Xanax; will increase the dose of Hydroxyzine.    · Medication management     V58.69/Z79.899    A.) Medications refilled as noted below.       Problems Reconciled  Plan  · Orders  o Fluzone High Dose Flu Vaccine (71676) - V04.81/Z23 - 12/17/2019  o CMP [facility] (49160) - 250.00/E11.9 - 12/17/2019  o Hgb A1c [facility] (75144) - 250.00/E11.9 - 12/17/2019  o Lipid Panel [facility] (24120) - 250.00/E11.9 - 12/17/2019  o Urine microalbumin (16956) - 250.00/E11.9 - 12/17/2019  o Immunization Admin Fee (Single) ([facility]) (14089) - V04.81/Z23 - 12/17/2019  o ACO-14: Influenza immunization administered or previously received () - V04.81/Z23 - 12/17/2019  o ACO-39: Current medications updated and reviewed () - - 12/17/2019  o CBC with Manual Diff if indicated [facility] (85835, 13242) - 250.00/E11.9 - 12/17/2019  o TSH + free t4 (49177, 67688) - 789.00/R10.9 - 12/17/2019  o Hepatitis C antibody (41287) - 789.00/R10.9 - 12/17/2019  o ESR (erythrocyte sedimentation rate) (47602) - 789.00/R10.9 - 12/17/2019  o CRP (Inflammation) [facility] (07910) - 789.00/R10.9 - 12/17/2019  o Occult blood, fecal, guaiac, 1-3 samples [facility] (47655) - 789.00/R10.9 - 12/17/2019  o Fluzone High Dose Flu Vaccine (38380) - V04.81/Z23 -  12/17/2019  o Abdomen (Flat and Upright) [facility] (02015) - 789.00/R10.9 - 12/17/2019   New onset abdominal pain.  o PFT Complete/Full Study (including pre/post) [facility] (74848, 25216, 69176) - 496/J44.9 - 12/17/2019  · Medications  o dicyclomine 10 mg oral capsule   SIG: take 1 capsule (10 mg) by oral route 3 times per day PRN abdominal pain   DISP: (60) capsules with 0 refills  Prescribed on 12/17/2019     o Zofran 8 mg oral tablet   SIG: Take 1 tablet PO BID PRN nausea   DISP: (20) tablets with 0 refills  Prescribed on 12/17/2019     o Qvar RediHaler 40 mcg/actuation inhalation HFA aerosol breath activated   SIG: inhale 1 puff (40 mcg) by inhalation route 2 times per day   DISP: (1) Hfa aerosol breath activated with 2 refills  Prescribed on 12/17/2019     o hydroxyzine pamoate 50 mg oral capsule   SIG: take 1 capsule by oral route BID PRN anxiety   DISP: (60) capsules with 2 refills  Adjusted on 12/17/2019     o amlodipine 10 mg oral tablet   SIG: take 1 tablet by mouth once daily   DISP: (90) Tablet with 0 refills  Refilled on 12/17/2019     o Anoro Ellipta 62.5-25 mcg/actuation inhalation blister with device   SIG: inhale 1 puff by inhalation route once daily at the same time each day for 30 days   DISP: (60) Blister with 2 refills  Refilled on 12/17/2019     o Keppra 500 mg oral tablet   SIG: take 1 tablet (500 mg) by oral route 2 times per day for 30 days   DISP: (60) tablets with 2 refills  Refilled on 12/17/2019     o levalbuterol HCl 0.31 mg/3 mL inhalation solution for nebulization   SIG: use in nebulizer as directed 3 times a day for 30 days   DISP: (10) 3 ml vial with 2 refills  Refilled on 12/17/2019     o lisinopril 20 mg oral tablet   SIG: take 1 tablet (20 mg) by oral route once daily for 90 days   DISP: (90) tablets with 0 refills  Refilled on 12/17/2019     o omeprazole 20 mg oral capsule,delayed release(DR/EC)   SIG: take 1 capsule by mouth once daily   DISP: (90) Capsule with 0  refills  Refilled on 12/17/2019     o pravastatin 40 mg oral tablet   SIG: take 1 tablet by mouth at bedtime   DISP: (90) Tablet with 0 refills  Refilled on 12/17/2019     o Remeron 30 mg oral tablet   SIG: take 1 tablet (30 mg) by oral route once daily before bedtime for 30 days   DISP: (30) tablets with 2 refills  Refilled on 12/17/2019     o Ventolin HFA 90 mcg/actuation inhalation HFA aerosol inhaler   SIG: inhale 1 - 2 puffs (90 - 180 mcg) by inhalation route every 4-6 hours as needed for 30 days   DISP: (18) Gram with 2 refills  Refilled on 12/17/2019     o Xanax 0.5 mg oral tablet   SIG: take 1 tablet (0.5 mg) by oral route 3 times per day for 30 days   DISP: (90) tablets with 2 refills  Discontinued on 12/17/2019     o Medications have been Reconciled  o Transition of Care or Provider Policy  · Instructions  o Instructed to seek medical attention urgently for new or worsening symptoms.  o Patient was educated/instructed on their diagnosis, treatment and medications prior to discharge from the clinic today.            Electronically Signed by: Erich Joya DO - on December 20, 2019 09:43:21 AM

## 2021-05-07 NOTE — PROGRESS NOTES
Quick Note      Patient Name: Bessy Leggett   Patient ID: [id number]   Sex: Female   YOB: 1950    Primary Care Provider: Trcai Jensen MD   Referring Provider: Traci Jensen MD    Visit Date: April 27, 2020    Provider: Erich Joya DO   Location: [facility]   Location Address: [address] [place] [address] [place]  [address]   Location Phone: [phone]          History Of Present Illness  TELEHEALTH TELEPHONE VISIT  Chief Complaint: RX REFILLS   Bessy Leggett is a 70 year old /White female who is presenting for evaluation via telehealth telephone visit conducted remotely from provider's home. Verbal consent obtained before beginning visit. No other persons present in the room during this visit.   Provider spent 12 minutes with the patient during telehealth visit.   The following staff were present during this visit: Erich Joya DO   Past Medical History/Overview of Patient Symptoms     1.) RX REFILLS : The patient presents for medications refills. History includes diabetes mellitus type II, COPD, HLD and depression. She reports that she needs refills of all of her medications. Her most recent labs were completed in 12/2019. She denies any current complaints regarding any of her medications.           Assessment  · Anxiety disorder     300.00/F41.9    A.) Hydroxyzine PRN as noted.     · COPD (chronic obstructive pulmonary disease)     496/J44.9    A.) Anuro, Flovent and rescue inhaler refilled as noted. Pt denies any breathing concerns today.    · Depression     311/F32.9    A.) Remeron refilled as noted.     · Diabetes mellitus, type 2     250.00/E11.9    A.) Healthy diet and exercise recommended. Pt does not take any diabetic medications.     · Essential hypertension     401.9/I10    A.) Lisinopril and Amlodipine refilled as noted.     · GERD (gastroesophageal reflux disease)     530.81/K21.9    A.) Omeprazole refilled as noted.      · Hyperlipidemia     272.4/E78.5    A.) Pravastatin refilled as noted.     · Seizure disorder     345.90/G40.909    A.) Keppra refilled as noted.       Problems Reconciled  Plan  · Orders  o Physician Telephone Evaluation, 11-20 minutes (88870) - 250.00/E11.9, 496/J44.9, 272.4/E78.5, 401.9/I10 - 04/27/2020  · Medications  o amlodipine 10 mg oral tablet   SIG: take 1 tablet by mouth once daily   DISP: (90) Tablet with 0 refills  Refilled on 04/27/2020     o Anoro Ellipta 62.5-25 mcg/actuation inhalation blister with device   SIG: inhale 1 puff by inhalation route once daily at the same time each day for 30 days   DISP: (60) Blister with 2 refills  Refilled on 04/27/2020     o Flovent HFA 44 mcg/actuation inhalation HFA aerosol inhaler   SIG: inhale 2 puffs (88 mcg) by inhalation route 2 times per day   DISP: (1) 10.6 gm aer w/adap with 2 refills  Refilled on 04/27/2020     o hydroxyzine pamoate 50 mg oral capsule   SIG: take 1 capsule by oral route BID PRN anxiety   DISP: (60) capsules with 2 refills  Refilled on 04/27/2020     o Keppra 500 mg oral tablet   SIG: take 1 tablet (500 mg) by oral route 2 times per day for 30 days   DISP: (60) tablets with 2 refills  Refilled on 04/27/2020     o lisinopril 20 mg oral tablet   SIG: take 1 tablet (20 mg) by oral route once daily for 90 days   DISP: (90) tablets with 0 refills  Refilled on 04/27/2020     o omeprazole 20 mg oral capsule,delayed release(DR/EC)   SIG: take 1 capsule by mouth once daily   DISP: (90) Capsule with 0 refills  Refilled on 04/27/2020     o pravastatin 40 mg oral tablet   SIG: take 1 tablet by mouth at bedtime   DISP: (90) Tablet with 0 refills  Refilled on 04/27/2020     o Remeron 30 mg oral tablet   SIG: take 1 tablet (30 mg) by oral route once daily before bedtime for 30 days   DISP: (30) tablets with 2 refills  Refilled on 04/27/2020     o Ventolin HFA 90 mcg/actuation inhalation HFA aerosol inhaler   SIG: INHALE 1 TO 2 PUFFS BY MOUTH AND INTO  THE LUNGS EVERY 4 TO 6 HOURS IF NEEDED   DISP: (18) Gram with 2 refills  Refilled on 04/27/2020     o alprazolam 1 mg oral tablet   SIG: Take 1 tablet PO PRN anxiety   DISP: (30) tablets with 0 refills  Discontinued on 04/27/2020     o dicyclomine 10 mg oral capsule   SIG: take 1 capsule (10 mg) by oral route 3 times per day PRN abdominal pain   DISP: (60) capsules with 0 refills  Discontinued on 04/27/2020     o Zofran 8 mg oral tablet   SIG: Take 1 tablet PO BID PRN nausea   DISP: (20) tablets with 0 refills  Discontinued on 04/27/2020     · Instructions  o Continue blood sugar monitoring daily and record. Bring your log to office visits. Call the office for readings below 70 and above 250 or any complications.  o Daily foot care. Avoid walking barefoot. Annual Dilated Eye Exam.  o Discussed with patient blood pressure monitoring, hemoglobin A1C levels need to be below 7.0, and LDL (Lipid) goals below 70.  o Patient advised to monitor blood pressure (B/P) at home and journal readings. Patient informed that a B/P reading at home of more than 130/80 is considered hypertension. For readings greater ewto105/90 or higher patient is advised to follow up in the office with readings for management. Patient advised to limit sodium intake.  o Maintain a healthy weight. Avoid tight fitting clothes. Avoid fried, fatty foods, tomato sauce, chocolate, mint, garlic, onion, alcohol. caffeine. Eat smaller meals, don't lie down after a meal, don't smoke. Elevate the head of your bed 6-9 inches.  o Plan Of Care: See assessment section. Follow up in office in 3 months.            Electronically Signed by: Erich Joya DO -Author on April 27, 2020 01:41:59 PM

## 2021-05-09 VITALS
OXYGEN SATURATION: 98 % | SYSTOLIC BLOOD PRESSURE: 124 MMHG | HEART RATE: 112 BPM | DIASTOLIC BLOOD PRESSURE: 80 MMHG | WEIGHT: 111 LBS | TEMPERATURE: 98.4 F

## 2021-05-09 VITALS
WEIGHT: 112.37 LBS | TEMPERATURE: 98 F | BODY MASS INDEX: 19.91 KG/M2 | SYSTOLIC BLOOD PRESSURE: 128 MMHG | DIASTOLIC BLOOD PRESSURE: 68 MMHG | OXYGEN SATURATION: 93 % | HEIGHT: 63 IN | HEART RATE: 106 BPM

## 2021-05-09 VITALS
DIASTOLIC BLOOD PRESSURE: 70 MMHG | WEIGHT: 111.12 LBS | HEIGHT: 63 IN | BODY MASS INDEX: 19.69 KG/M2 | OXYGEN SATURATION: 91 % | HEART RATE: 97 BPM | SYSTOLIC BLOOD PRESSURE: 118 MMHG | TEMPERATURE: 98.5 F

## 2021-05-09 VITALS
OXYGEN SATURATION: 94 % | HEIGHT: 63 IN | HEART RATE: 118 BPM | DIASTOLIC BLOOD PRESSURE: 80 MMHG | BODY MASS INDEX: 22.15 KG/M2 | WEIGHT: 125 LBS | SYSTOLIC BLOOD PRESSURE: 124 MMHG | TEMPERATURE: 98.2 F

## 2021-05-09 VITALS
OXYGEN SATURATION: 93 % | HEART RATE: 110 BPM | BODY MASS INDEX: 18.49 KG/M2 | WEIGHT: 104.37 LBS | HEIGHT: 63 IN | TEMPERATURE: 97.8 F | DIASTOLIC BLOOD PRESSURE: 86 MMHG | SYSTOLIC BLOOD PRESSURE: 124 MMHG

## 2021-05-09 VITALS
TEMPERATURE: 97.8 F | HEART RATE: 102 BPM | SYSTOLIC BLOOD PRESSURE: 142 MMHG | DIASTOLIC BLOOD PRESSURE: 84 MMHG | BODY MASS INDEX: 21.35 KG/M2 | WEIGHT: 120.5 LBS | HEIGHT: 63 IN | OXYGEN SATURATION: 93 %

## 2021-05-09 VITALS
WEIGHT: 111.5 LBS | DIASTOLIC BLOOD PRESSURE: 90 MMHG | TEMPERATURE: 97.9 F | HEART RATE: 119 BPM | SYSTOLIC BLOOD PRESSURE: 128 MMHG | HEIGHT: 63 IN | BODY MASS INDEX: 19.76 KG/M2 | OXYGEN SATURATION: 91 %

## 2021-05-09 VITALS
WEIGHT: 132 LBS | DIASTOLIC BLOOD PRESSURE: 94 MMHG | OXYGEN SATURATION: 95 % | SYSTOLIC BLOOD PRESSURE: 142 MMHG | HEART RATE: 110 BPM | TEMPERATURE: 97.3 F

## 2021-05-09 VITALS
SYSTOLIC BLOOD PRESSURE: 144 MMHG | OXYGEN SATURATION: 96 % | DIASTOLIC BLOOD PRESSURE: 90 MMHG | HEART RATE: 119 BPM | TEMPERATURE: 98 F | WEIGHT: 109 LBS

## 2021-05-09 VITALS
DIASTOLIC BLOOD PRESSURE: 118 MMHG | WEIGHT: 118 LBS | TEMPERATURE: 98.6 F | HEART RATE: 110 BPM | OXYGEN SATURATION: 98 % | SYSTOLIC BLOOD PRESSURE: 190 MMHG

## 2021-05-09 VITALS
DIASTOLIC BLOOD PRESSURE: 84 MMHG | TEMPERATURE: 98.8 F | SYSTOLIC BLOOD PRESSURE: 134 MMHG | HEART RATE: 114 BPM | WEIGHT: 105.12 LBS | OXYGEN SATURATION: 95 %

## 2021-05-09 VITALS
HEART RATE: 114 BPM | TEMPERATURE: 97.4 F | DIASTOLIC BLOOD PRESSURE: 60 MMHG | WEIGHT: 127 LBS | SYSTOLIC BLOOD PRESSURE: 122 MMHG | OXYGEN SATURATION: 99 %

## 2021-05-09 VITALS
DIASTOLIC BLOOD PRESSURE: 68 MMHG | TEMPERATURE: 97.8 F | SYSTOLIC BLOOD PRESSURE: 108 MMHG | OXYGEN SATURATION: 94 % | HEART RATE: 104 BPM | WEIGHT: 104 LBS

## 2021-05-09 VITALS
OXYGEN SATURATION: 96 % | DIASTOLIC BLOOD PRESSURE: 60 MMHG | SYSTOLIC BLOOD PRESSURE: 142 MMHG | WEIGHT: 109 LBS | HEART RATE: 119 BPM | TEMPERATURE: 99.2 F

## 2021-05-16 VITALS
HEART RATE: 105 BPM | WEIGHT: 109 LBS | OXYGEN SATURATION: 98 % | BODY MASS INDEX: 19.31 KG/M2 | DIASTOLIC BLOOD PRESSURE: 106 MMHG | SYSTOLIC BLOOD PRESSURE: 160 MMHG

## 2021-05-16 VITALS
DIASTOLIC BLOOD PRESSURE: 96 MMHG | SYSTOLIC BLOOD PRESSURE: 159 MMHG | WEIGHT: 128.5 LBS | HEIGHT: 63 IN | HEART RATE: 97 BPM | BODY MASS INDEX: 22.77 KG/M2

## 2021-08-03 ENCOUNTER — TELEPHONE (OUTPATIENT)
Dept: FAMILY MEDICINE CLINIC | Facility: CLINIC | Age: 71
End: 2021-08-03

## 2021-08-03 RX ORDER — MIRTAZAPINE 30 MG/1
30 TABLET, FILM COATED ORAL NIGHTLY
COMMUNITY
End: 2021-08-03 | Stop reason: SDUPTHER

## 2021-08-03 RX ORDER — ALBUTEROL SULFATE 90 UG/1
1 AEROSOL, METERED RESPIRATORY (INHALATION) EVERY 6 HOURS
COMMUNITY
End: 2021-08-03 | Stop reason: SDUPTHER

## 2021-08-03 RX ORDER — ALBUTEROL SULFATE 90 UG/1
1 AEROSOL, METERED RESPIRATORY (INHALATION) EVERY 6 HOURS
Qty: 8 G | Refills: 0 | Status: SHIPPED | OUTPATIENT
Start: 2021-08-03 | End: 2022-02-25 | Stop reason: SDUPTHER

## 2021-08-03 RX ORDER — AMLODIPINE BESYLATE 10 MG/1
10 TABLET ORAL DAILY
Qty: 30 TABLET | Refills: 0 | Status: SHIPPED | OUTPATIENT
Start: 2021-08-03 | End: 2022-02-25 | Stop reason: SDUPTHER

## 2021-08-03 RX ORDER — MIRTAZAPINE 30 MG/1
30 TABLET, FILM COATED ORAL NIGHTLY
Qty: 30 TABLET | Refills: 0 | Status: SHIPPED | OUTPATIENT
Start: 2021-08-03 | End: 2021-09-03

## 2021-08-03 RX ORDER — LISINOPRIL 20 MG/1
20 TABLET ORAL DAILY
COMMUNITY
End: 2021-08-03 | Stop reason: SDUPTHER

## 2021-08-03 RX ORDER — HYDROXYZINE 50 MG/1
50 TABLET, FILM COATED ORAL 3 TIMES DAILY PRN
Qty: 30 TABLET | Refills: 0 | Status: SHIPPED | OUTPATIENT
Start: 2021-08-03 | End: 2022-02-25 | Stop reason: SDUPTHER

## 2021-08-03 RX ORDER — PRAVASTATIN SODIUM 40 MG
40 TABLET ORAL DAILY
Qty: 30 TABLET | Refills: 0 | Status: SHIPPED | OUTPATIENT
Start: 2021-08-03 | End: 2022-02-25 | Stop reason: SDUPTHER

## 2021-08-03 RX ORDER — AMLODIPINE BESYLATE 10 MG/1
10 TABLET ORAL DAILY
COMMUNITY
End: 2021-08-03 | Stop reason: SDUPTHER

## 2021-08-03 RX ORDER — HYDROXYZINE 50 MG/1
50 TABLET, FILM COATED ORAL 3 TIMES DAILY PRN
COMMUNITY
End: 2021-08-03 | Stop reason: SDUPTHER

## 2021-08-03 RX ORDER — PRAVASTATIN SODIUM 40 MG
40 TABLET ORAL DAILY
COMMUNITY
End: 2021-08-03 | Stop reason: SDUPTHER

## 2021-08-03 RX ORDER — LISINOPRIL 20 MG/1
20 TABLET ORAL DAILY
Qty: 30 TABLET | Refills: 0 | Status: SHIPPED | OUTPATIENT
Start: 2021-08-03 | End: 2022-02-25 | Stop reason: SDUPTHER

## 2021-08-03 NOTE — TELEPHONE ENCOUNTER
Caller: JANELLE THOMAS    Best call back number: [phone]    Medication needed:   Requested Prescriptions      No prescriptions requested or ordered in this encounter   PRAVASTATIN 40 MG, ONCE A DAY  LISINOPRIL 20 MG, ONCE A DAY  HYDROXYZINE 50 MG, ONCE A DAY  MIRTAZAPINE 30 MG, ONCE A DAY  AMLODIPINE BESYLATE 10 MG, ONCE A DAY  ALBUTEROL INHALER 18 GM, 1-2 PUFFS A DAY  ELLIPTA INHALER    When do you need the refill by: ASAP, PLEASE CALL PATIENT TO ADVISE ON REFILL STATUS    Does the patient have less than a 3 day supply:  [x] Yes  [] No    What is the patient's preferred pharmacy: [id number] [address] AT [facility] [place] [facility] [place] - [phone]  - [phone]

## 2021-09-03 RX ORDER — MIRTAZAPINE 30 MG/1
30 TABLET, FILM COATED ORAL NIGHTLY
Qty: 30 TABLET | Refills: 0 | Status: SHIPPED | OUTPATIENT
Start: 2021-09-03 | End: 2021-10-04

## 2021-10-04 RX ORDER — MIRTAZAPINE 30 MG/1
30 TABLET, FILM COATED ORAL NIGHTLY
Qty: 30 TABLET | Refills: 0 | Status: SHIPPED | OUTPATIENT
Start: 2021-10-04 | End: 2022-02-25 | Stop reason: SDUPTHER

## 2021-12-17 ENCOUNTER — APPOINTMENT (OUTPATIENT)
Dept: GENERAL RADIOLOGY | Facility: HOSPITAL | Age: 71
End: 2021-12-17

## 2021-12-17 ENCOUNTER — HOSPITAL ENCOUNTER (EMERGENCY)
Facility: HOSPITAL | Age: 71
Discharge: HOME | End: 2021-12-17
Attending: EMERGENCY MEDICINE | Admitting: EMERGENCY MEDICINE

## 2021-12-17 VITALS
HEART RATE: 91 BPM | RESPIRATION RATE: 20 BRPM | DIASTOLIC BLOOD PRESSURE: 79 MMHG | TEMPERATURE: 98.8 F | BODY MASS INDEX: 26.31 KG/M2 | SYSTOLIC BLOOD PRESSURE: 135 MMHG | OXYGEN SATURATION: 92 % | HEIGHT: 61 IN | WEIGHT: 139.33 LBS

## 2021-12-17 DIAGNOSIS — K64.9 HEMORRHOIDS, UNSPECIFIED HEMORRHOID TYPE: ICD-10-CM

## 2021-12-17 DIAGNOSIS — K62.89 RECTAL PAIN: Primary | ICD-10-CM

## 2021-12-17 DIAGNOSIS — K59.00 CONSTIPATION, UNSPECIFIED CONSTIPATION TYPE: ICD-10-CM

## 2021-12-17 LAB
ALBUMIN SERPL-MCNC: 4.1 G/DL (ref 3.5–5.2)
ALBUMIN/GLOB SERPL: 1.7 G/DL
ALP SERPL-CCNC: 79 U/L (ref 39–117)
ALT SERPL W P-5'-P-CCNC: 15 U/L (ref 1–33)
ANION GAP SERPL CALCULATED.3IONS-SCNC: 8.6 MMOL/L (ref 5–15)
AST SERPL-CCNC: 15 U/L (ref 1–32)
BASOPHILS # BLD AUTO: 0.05 10*3/MM3 (ref 0–0.2)
BASOPHILS NFR BLD AUTO: 0.7 % (ref 0–1.5)
BILIRUB SERPL-MCNC: 0.2 MG/DL (ref 0–1.2)
BUN SERPL-MCNC: 23 MG/DL (ref 8–23)
BUN/CREAT SERPL: 25 (ref 7–25)
CALCIUM SPEC-SCNC: 9.4 MG/DL (ref 8.6–10.5)
CHLORIDE SERPL-SCNC: 106 MMOL/L (ref 98–107)
CK MB SERPL-CCNC: 2.73 NG/ML
CK SERPL-CCNC: 70 U/L (ref 20–180)
CO2 SERPL-SCNC: 25.4 MMOL/L (ref 22–29)
CREAT SERPL-MCNC: 0.92 MG/DL (ref 0.57–1)
DEPRECATED RDW RBC AUTO: 44.1 FL (ref 37–54)
EOSINOPHIL # BLD AUTO: 0.23 10*3/MM3 (ref 0–0.4)
EOSINOPHIL NFR BLD AUTO: 3.1 % (ref 0.3–6.2)
ERYTHROCYTE [DISTWIDTH] IN BLOOD BY AUTOMATED COUNT: 12.8 % (ref 12.3–15.4)
GFR SERPL CREATININE-BSD FRML MDRD: 60 ML/MIN/1.73
GLOBULIN UR ELPH-MCNC: 2.4 GM/DL
GLUCOSE SERPL-MCNC: 99 MG/DL (ref 65–99)
HCT VFR BLD AUTO: 47.1 % (ref 34–46.6)
HGB BLD-MCNC: 16 G/DL (ref 12–15.9)
HOLD SPECIMEN: NORMAL
IMM GRANULOCYTES # BLD AUTO: 0.02 10*3/MM3 (ref 0–0.05)
IMM GRANULOCYTES NFR BLD AUTO: 0.3 % (ref 0–0.5)
LIPASE SERPL-CCNC: 32 U/L (ref 13–60)
LYMPHOCYTES # BLD AUTO: 2.38 10*3/MM3 (ref 0.7–3.1)
LYMPHOCYTES NFR BLD AUTO: 31.6 % (ref 19.6–45.3)
MAGNESIUM SERPL-MCNC: 2.1 MG/DL (ref 1.6–2.4)
MCH RBC QN AUTO: 31.8 PG (ref 26.6–33)
MCHC RBC AUTO-ENTMCNC: 34 G/DL (ref 31.5–35.7)
MCV RBC AUTO: 93.6 FL (ref 79–97)
MONOCYTES # BLD AUTO: 0.61 10*3/MM3 (ref 0.1–0.9)
MONOCYTES NFR BLD AUTO: 8.1 % (ref 5–12)
NEUTROPHILS NFR BLD AUTO: 4.25 10*3/MM3 (ref 1.7–7)
NEUTROPHILS NFR BLD AUTO: 56.2 % (ref 42.7–76)
NRBC BLD AUTO-RTO: 0 /100 WBC (ref 0–0.2)
NT-PROBNP SERPL-MCNC: 142.3 PG/ML (ref 0–900)
PLATELET # BLD AUTO: 181 10*3/MM3 (ref 140–450)
PMV BLD AUTO: 9.4 FL (ref 6–12)
POTASSIUM SERPL-SCNC: 4.1 MMOL/L (ref 3.5–5.2)
PROT SERPL-MCNC: 6.5 G/DL (ref 6–8.5)
RBC # BLD AUTO: 5.03 10*6/MM3 (ref 3.77–5.28)
SODIUM SERPL-SCNC: 140 MMOL/L (ref 136–145)
TROPONIN I SERPL-MCNC: 0.01 NG/ML (ref 0–0.6)
WBC NRBC COR # BLD: 7.54 10*3/MM3 (ref 3.4–10.8)
WHOLE BLOOD HOLD SPECIMEN: NORMAL
WHOLE BLOOD HOLD SPECIMEN: NORMAL

## 2021-12-17 PROCEDURE — 83735 ASSAY OF MAGNESIUM: CPT | Performed by: EMERGENCY MEDICINE

## 2021-12-17 PROCEDURE — 83690 ASSAY OF LIPASE: CPT | Performed by: EMERGENCY MEDICINE

## 2021-12-17 PROCEDURE — 82553 CREATINE MB FRACTION: CPT | Performed by: EMERGENCY MEDICINE

## 2021-12-17 PROCEDURE — 82550 ASSAY OF CK (CPK): CPT | Performed by: EMERGENCY MEDICINE

## 2021-12-17 PROCEDURE — 93010 ELECTROCARDIOGRAM REPORT: CPT | Performed by: INTERNAL MEDICINE

## 2021-12-17 PROCEDURE — 80053 COMPREHEN METABOLIC PANEL: CPT | Performed by: EMERGENCY MEDICINE

## 2021-12-17 PROCEDURE — 83880 ASSAY OF NATRIURETIC PEPTIDE: CPT | Performed by: EMERGENCY MEDICINE

## 2021-12-17 PROCEDURE — 99285 EMERGENCY DEPT VISIT HI MDM: CPT

## 2021-12-17 PROCEDURE — 71045 X-RAY EXAM CHEST 1 VIEW: CPT

## 2021-12-17 PROCEDURE — 85025 COMPLETE CBC W/AUTO DIFF WBC: CPT | Performed by: EMERGENCY MEDICINE

## 2021-12-17 PROCEDURE — 93005 ELECTROCARDIOGRAM TRACING: CPT | Performed by: EMERGENCY MEDICINE

## 2021-12-17 PROCEDURE — 84484 ASSAY OF TROPONIN QUANT: CPT

## 2021-12-17 PROCEDURE — 93005 ELECTROCARDIOGRAM TRACING: CPT

## 2021-12-17 PROCEDURE — 74019 RADEX ABDOMEN 2 VIEWS: CPT

## 2021-12-17 RX ORDER — POLYETHYLENE GLYCOL 3350 17 G/17G
17 POWDER, FOR SOLUTION ORAL DAILY
Qty: 20 EACH | Refills: 0 | Status: SHIPPED | OUTPATIENT
Start: 2021-12-17 | End: 2022-10-21

## 2021-12-17 RX ORDER — SODIUM CHLORIDE 0.9 % (FLUSH) 0.9 %
10 SYRINGE (ML) INJECTION AS NEEDED
Status: DISCONTINUED | OUTPATIENT
Start: 2021-12-17 | End: 2021-12-17 | Stop reason: HOSPADM

## 2021-12-17 RX ORDER — ASPIRIN 81 MG/1
324 TABLET, CHEWABLE ORAL ONCE
Status: DISCONTINUED | OUTPATIENT
Start: 2021-12-17 | End: 2021-12-17 | Stop reason: HOSPADM

## 2021-12-17 RX ORDER — HYDROCORTISONE ACETATE 25 MG/1
25 SUPPOSITORY RECTAL 2 TIMES DAILY
Qty: 10 SUPPOSITORY | Refills: 0 | Status: SHIPPED | OUTPATIENT
Start: 2021-12-17 | End: 2021-12-22

## 2021-12-17 RX ADMIN — Medication 300 ML: at 07:00

## 2021-12-17 NOTE — ED PROVIDER NOTES
Time: 4:40 AM EST  Arrived by: ambulance  Chief Complaint: rectal pain    History of Present Illness:  Patient is a 71 y.o. year old female that presents to the emergency department with rectal pain that started 3 years ago after having a colonoscopy. Pt has a sensation like something is in her rectum. Pt reports that the pain is worse when she lays on her side. Pt had the scope performed by Dr. Cotter and pt hasn't followed up since the procedure. Pt notes that it's been worse over the last 3 days. Pt last BM was a few weeks ago. Pt has had nausea, but no vomiting. Pt denies any chest pain.       Rectal Pain  Severity:  Mild  Onset quality:  Gradual  Duration:  36 months  Timing:  Constant  Progression:  Worsening  Context:  Since after colonoscopy  Relieved by:  Nothing  Worsened by:  Nothing  Ineffective treatments:  None tried  Associated symptoms: nausea    Associated symptoms: no chest pain, no cough, no diarrhea, no fever, no rash, no shortness of breath and no vomiting        Similar Symptoms Previously: no  Recently seen: no      Patient Care Team  Primary Care Provider: Erich Joya DO    Past Medical History:     No Known Allergies  Past Medical History:   Diagnosis Date   • Anxiety 05/23/2018    PATIENT REPORTS A LONG HISTORY OF ANXIETY ALONG IWTH AGOURA PHOBIA. SHE HAS BEEN PREVIOUSLY BEEN SEEN BY PSYCH RECENTLY., HER XANAX WAS STOPPED. I WILL REFER THE PATIENT FOR EVALUATION BY MENTAL HEALTH SPECIALIST   • Arthritis    • Asthma    • Cataract    • COPD (chronic obstructive pulmonary disease) ([facility])    • Depression    • Diabetes mellitus type 2, noninsulin dependent ([facility])    • GERD (gastroesophageal reflux disease)    • History of seizures    • Hyperlipidemia    • Hypertension    • IBS (irritable bowel syndrome)    • Low back pain    • Spells of decreased attentiveness 05/23/2018    THE PATIENT WAS INTIALLY SEEN IN FEB 2018. AT THAT TIME SHE PRESENTED THE HOSP WITH ALTERED MENTAL STATUS. SHE WAS  DIAGNOSED WITH UTI. DURING HOSPITAL STAY, SHE IS NOTED HAVE 2 SPELLS OF LOSS OF CONSCIOUSNESS, ASSOCIATED WITH GENERALIZED SHAKING FOLLOWED BY CONFUSION. CONCERN WAS RAISED FOR POSSIBLE SEIZURES. SHE NOTED A PRIORSPELL THAT OCCURED 30-40 YEARS AGO. ULTIMATELY SHE WAS STARTED ON KEPPRA     Past Surgical History:   Procedure Laterality Date   • ABDOMINAL SURGERY     • COLONOSCOPY     • GALLBLADDER SURGERY     • HYSTERECTOMY     • VASCULAR SURGERY       Family History   Problem Relation Age of Onset   • Stroke Other    • Heart disease Other    • Hypertension Other    • Cancer Other    • Diabetes Other        Home Medications:  Prior to Admission medications    Medication Sig Start Date End Date Taking? Authorizing Provider   albuterol sulfate HFA (Ventolin HFA) 108 (90 Base) MCG/ACT inhaler Inhale 1 puff Every 6 (Six) Hours. 8/3/21   Erich Joya DO   amLODIPine (NORVASC) 10 MG tablet Take 1 tablet by mouth Daily. 8/3/21   Erich Joya DO   Anoro Ellipta 62.5-25 MCG/INH aerosol powder  inhaler INHALE 1 PUFF BY MOUTH DAILY 10/4/21   Erich Joya DO   hydrOXYzine (ATARAX) 50 MG tablet Take 1 tablet by mouth 3 (Three) Times a Day As Needed for Itching. 8/3/21   Erich Joya DO   lisinopril (PRINIVIL,ZESTRIL) 20 MG tablet Take 1 tablet by mouth Daily. 8/3/21   Erich Joya DO   mirtazapine (REMERON) 30 MG tablet TAKE 1 TABLET BY MOUTH EVERY NIGHT 10/4/21   Erich Joya DO   pravastatin (PRAVACHOL) 40 MG tablet Take 1 tablet by mouth Daily. 8/3/21   Erich Joya DO        Social History:   Social History     Tobacco Use   • Smoking status: Current Every Day Smoker   • Smokeless tobacco: Never Used   Substance Use Topics   • Alcohol use: Never   • Drug use: Never       Record Review:  I have reviewed the patient's records in [facility].     Review of Systems:  Review of Systems   Constitutional: Negative for chills and fever.   HENT: Negative for nosebleeds.    Eyes: Negative for redness.   Respiratory: Negative for  "cough and shortness of breath.    Cardiovascular: Negative for chest pain.   Gastrointestinal: Positive for nausea and rectal pain. Negative for diarrhea and vomiting.   Genitourinary: Negative for dysuria and frequency.   Musculoskeletal: Negative for back pain and neck pain.   Skin: Negative for rash.   Neurological: Negative for seizures and syncope.   All other systems reviewed and are negative.       Physical Exam:  /79   Pulse 91   Temp 98.8 °F (37.1 °C) (Oral)   Resp 20   Ht 154.9 cm (61\")   Wt 63.2 kg (139 lb 5.3 oz)   SpO2 92%   BMI 26.33 kg/m²     Physical Exam  Vitals and nursing note reviewed.   Constitutional:       General: She is not in acute distress.     Appearance: She is not toxic-appearing.      Comments: Appears uncomfortable.    HENT:      Head: Normocephalic and atraumatic.      Nose: Nose normal.      Mouth/Throat:      Mouth: Mucous membranes are moist.   Eyes:      General: Lids are normal. No scleral icterus.     Conjunctiva/sclera: Conjunctivae normal.   Cardiovascular:      Rate and Rhythm: Normal rate and regular rhythm.      Heart sounds: Normal heart sounds. No murmur heard.      Pulmonary:      Effort: Pulmonary effort is normal. No respiratory distress.      Breath sounds: Normal breath sounds and air entry.   Chest:      Chest wall: No tenderness.   Abdominal:      Palpations: Abdomen is soft.      Tenderness: There is no abdominal tenderness. There is no guarding or rebound.   Genitourinary:     Comments: Rectal exam performed with SAMM Og at bedside.    Patient with small hemorrhoids around anal verge no evidence of thrombosis or infected hemorrhoids.  No evidence of anal fissure.   Musculoskeletal:         General: No tenderness. Normal range of motion.      Cervical back: Normal range of motion and neck supple.      Right lower leg: No edema.      Left lower leg: No edema.   Skin:     General: Skin is warm and dry.      Findings: No rash.   Neurological:      " Mental Status: She is alert and oriented to person, place, and time. Mental status is at baseline.      Sensory: Sensation is intact.      Motor: Motor function is intact.   Psychiatric:         Mood and Affect: Mood is anxious.         Behavior: Behavior normal.                Medications in the Emergency Department:  Medications   molasses enema (300 mL Rectal Given by Other 12/17/21 0700)        Labs  Lab Results (last 24 hours)     ** No results found for the last 24 hours. **           Imaging:  No Radiology Exams Resulted Within Past 24 Hours    Procedures:  Procedures     EKG:  Rhythm: sinus tachycardia  Rate: 103  Normal P waves  Normal QRS  Normal ST segment  Normal qT    EKG Comparison: unavailable    Interpreted by me    Progress                            Medical Decision Making:  MDM     X-ray shows moderate amount of constipation.  Patient treated with enema in the emergency department.  Patient was able to have bowel movement.  We will continue with oral MiraLAX.  Encourage patient to follow-up with PCP.  We discussed return precautions including worsening symptoms or any additional concerns.    Final diagnoses:   Rectal pain   Constipation, unspecified constipation type   Hemorrhoids, unspecified hemorrhoid type        Disposition:  ED Disposition     ED Disposition Condition Comment    Discharge Stable             Portions of this medical record may have been created using voice recognition software.    Documentation assistance provided by Jeimy Desai acting as scribe for Martin Betts MD. Information recorded by the scribe was done at my direction and has been verified and validated by me.          Jeimy Desai  12/17/21 0449       Jeimy Desai  12/17/21 0449       Martin Betts MD  12/18/21 8442

## 2021-12-17 NOTE — ED NOTES
Received by [facility] for Chest Pain and Rectal pain. Patient reports Rectal pain for several years and chest pain began over the last few days. Patient reports she has been under a lot of stress due to daughter having cancer.      Lenka Lozano RN  12/17/21 0324

## 2021-12-27 LAB — QT INTERVAL: 351 MS

## 2022-01-31 RX ORDER — PRAVASTATIN SODIUM 40 MG
TABLET ORAL
Qty: 90 TABLET | OUTPATIENT
Start: 2022-01-31

## 2022-01-31 RX ORDER — AMLODIPINE BESYLATE 10 MG/1
10 TABLET ORAL DAILY
Qty: 30 TABLET | Refills: 0 | OUTPATIENT
Start: 2022-01-31

## 2022-01-31 RX ORDER — LISINOPRIL 20 MG/1
TABLET ORAL
Qty: 90 TABLET | OUTPATIENT
Start: 2022-01-31

## 2022-01-31 RX ORDER — OMEPRAZOLE 20 MG/1
CAPSULE, DELAYED RELEASE ORAL
Qty: 90 CAPSULE | OUTPATIENT
Start: 2022-01-31

## 2022-02-10 ENCOUNTER — TELEPHONE (OUTPATIENT)
Dept: FAMILY MEDICINE CLINIC | Facility: CLINIC | Age: 72
End: 2022-02-10

## 2022-02-10 NOTE — TELEPHONE ENCOUNTER
Caller: MARLEE     Relationship to patient: Child    Best call back number: [phone]    Chief complaint: FOLLOW UP    Type of visit: MYCHART     Additional notes: PATIENT'S DAUGHTER, MARLEE, CALLED SAYING THAT PATIENT HAD SPOKE WITH [place]SANA TODAY, 02/10/2022, AND DISCUSSED POSSIBLY SCHEDULING A VIRTUAL APPOINTMENT WHERE PATIENT CAN BE SEEN BUT NOT HAVE TO LEAVE THE HOUSE. PATIENT IS CONFUSED ON HOW THAT WORKS. SO, PATIENT'S DAUGHTER, MARLEE, CALLED TO FIND OUT HOW THAT WOULD WORK. SHE IS WANTING TO ADD THE EMAIL [email or web address] AS THE EMAIL IN CHART TO JOIN THE MY CHART KATY FOR THE PATIENT TO DO THE VIRTUAL VISITS, BECAUSE SHE WAS UNSURE OF PATIENT'S EMAIL OR IF SHE EVEN HAS ONE.

## 2022-02-25 ENCOUNTER — TELEMEDICINE (OUTPATIENT)
Dept: FAMILY MEDICINE CLINIC | Facility: CLINIC | Age: 72
End: 2022-02-25

## 2022-02-25 DIAGNOSIS — J44.9 CHRONIC OBSTRUCTIVE PULMONARY DISEASE, UNSPECIFIED COPD TYPE: Primary | ICD-10-CM

## 2022-02-25 DIAGNOSIS — F33.2 SEVERE EPISODE OF RECURRENT MAJOR DEPRESSIVE DISORDER, WITHOUT PSYCHOTIC FEATURES: ICD-10-CM

## 2022-02-25 DIAGNOSIS — I10 PRIMARY HYPERTENSION: ICD-10-CM

## 2022-02-25 PROBLEM — E78.00 HYPERCHOLESTEROLEMIA: Status: ACTIVE | Noted: 2022-02-25

## 2022-02-25 PROBLEM — H26.9 CATARACT: Status: ACTIVE | Noted: 2022-02-25

## 2022-02-25 PROBLEM — M54.50 LOW BACK PAIN: Status: ACTIVE | Noted: 2022-02-25

## 2022-02-25 PROBLEM — K21.9 GERD (GASTROESOPHAGEAL REFLUX DISEASE): Status: ACTIVE | Noted: 2022-02-25

## 2022-02-25 PROBLEM — J45.909 ASTHMA: Status: ACTIVE | Noted: 2022-02-25

## 2022-02-25 PROBLEM — Z82.49 FAMILY HISTORY OF BLOOD CLOTS: Status: ACTIVE | Noted: 2022-02-25

## 2022-02-25 PROBLEM — Z87.898 HISTORY OF SEIZURE: Status: ACTIVE | Noted: 2022-02-25

## 2022-02-25 PROBLEM — K58.9 IBS (IRRITABLE BOWEL SYNDROME): Status: ACTIVE | Noted: 2022-02-25

## 2022-02-25 PROBLEM — F41.9 ANXIETY: Status: ACTIVE | Noted: 2018-05-23

## 2022-02-25 PROBLEM — I71.40 AAA (ABDOMINAL AORTIC ANEURYSM) WITHOUT RUPTURE: Status: ACTIVE | Noted: 2018-12-19

## 2022-02-25 PROBLEM — F32.A DEPRESSION: Status: ACTIVE | Noted: 2022-02-25

## 2022-02-25 PROBLEM — E11.9 NON-INSULIN DEPENDENT TYPE 2 DIABETES MELLITUS: Status: ACTIVE | Noted: 2022-02-25

## 2022-02-25 PROBLEM — Z98.890 HISTORY OF AAA (ABDOMINAL AORTIC ANEURYSM) REPAIR: Status: ACTIVE | Noted: 2020-09-09

## 2022-02-25 PROCEDURE — 99214 OFFICE O/P EST MOD 30 MIN: CPT | Performed by: FAMILY MEDICINE

## 2022-02-25 RX ORDER — ALBUTEROL SULFATE 90 UG/1
1 AEROSOL, METERED RESPIRATORY (INHALATION) EVERY 6 HOURS
Qty: 8 G | Refills: 2 | Status: SHIPPED | OUTPATIENT
Start: 2022-02-25 | End: 2022-09-19

## 2022-02-25 RX ORDER — HYDROXYZINE 50 MG/1
50 TABLET, FILM COATED ORAL 3 TIMES DAILY PRN
Qty: 30 TABLET | Refills: 2 | Status: SHIPPED | OUTPATIENT
Start: 2022-02-25 | End: 2022-10-21

## 2022-02-25 RX ORDER — LISINOPRIL 20 MG/1
20 TABLET ORAL DAILY
Qty: 90 TABLET | Refills: 0 | Status: SHIPPED | OUTPATIENT
Start: 2022-02-25 | End: 2022-05-02

## 2022-02-25 RX ORDER — AMLODIPINE BESYLATE 10 MG/1
10 TABLET ORAL DAILY
Qty: 30 TABLET | Refills: 0 | Status: SHIPPED | OUTPATIENT
Start: 2022-02-25 | End: 2022-03-01

## 2022-02-25 RX ORDER — PRAVASTATIN SODIUM 40 MG
40 TABLET ORAL DAILY
Qty: 90 TABLET | Refills: 0 | Status: SHIPPED | OUTPATIENT
Start: 2022-02-25 | End: 2022-05-02

## 2022-02-25 RX ORDER — MIRTAZAPINE 30 MG/1
30 TABLET, FILM COATED ORAL NIGHTLY
Qty: 90 TABLET | Refills: 0 | Status: SHIPPED | OUTPATIENT
Start: 2022-02-25 | End: 2022-04-01

## 2022-02-25 NOTE — PROGRESS NOTES
Mode of Visit: Video  Location of patient: home  You have chosen to receive care through a telehealth visit.  Does the patient consent to use a video/audio connection for your medical care today? Yes  The visit included audio and video interaction. No technical issues occurred during this visit.     Chief Complaint    Subjective      Bessy Leggett presents to [facility]     History of Present Illness     1.) COPD : Stable. No breathing complaints today.     2.) HTN : No complaints regarding current dosages of medications. No complaints of elevated blood pressures at home.     3.) DEPRESSION : Stable. No complaints of worsening mood sxs.     Objective      Vital Signs:     There were no vitals taken for this visit.      Physical Exam   Constitutional: She appears well-developed and well-nourished. No distress.   HENT:   Head: Normocephalic and atraumatic.   Eyes: Conjunctivae and EOM are normal.   Neck: Neck normal appearance.  Pulmonary/Chest: Effort normal.   Neurological: She is alert.   Psychiatric: She has a normal mood and affect.      Assessment and Plan    Diagnoses and all orders for this visit:    1. Chronic obstructive pulmonary disease, unspecified COPD type (HCC) (Primary)    2. Primary hypertension    3. Severe episode of recurrent major depressive disorder, without psychotic features ([facility])    Other orders  -     umeclidinium-vilanterol (Anoro Ellipta) 62.5-25 MCG/INH aerosol powder  inhaler; Inhale 1 puff Daily.  Dispense: 60 each; Refill: 2  -     albuterol sulfate HFA (Ventolin HFA) 108 (90 Base) MCG/ACT inhaler; Inhale 1 puff Every 6 (Six) Hours.  Dispense: 8 g; Refill: 2  -     amLODIPine (NORVASC) 10 MG tablet; Take 1 tablet by mouth Daily.  Dispense: 30 tablet; Refill: 0  -     lisinopril (PRINIVIL,ZESTRIL) 20 MG tablet; Take 1 tablet by mouth Daily.  Dispense: 90 tablet; Refill: 0  -     mirtazapine (REMERON) 30 MG tablet; Take 1 tablet by mouth Every Night.   Dispense: 90 tablet; Refill: 0  -     pravastatin (PRAVACHOL) 40 MG tablet; Take 1 tablet by mouth Daily.  Dispense: 90 tablet; Refill: 0  -     hydrOXYzine (ATARAX) 50 MG tablet; Take 1 tablet by mouth 3 (Three) Times a Day As Needed for Itching.  Dispense: 30 tablet; Refill: 2    · Patient appears stable. Refills as noted x 90 day supply. Patient advised that a visit is warranted for her next refill, as no recent labs noted in chart. She is agreeable.     Follow Up     Return in about 3 months (around 5/25/2022) for labs and follow up chronic conditions.     Patient was given instructions and counseling regarding her condition or for health maintenance advice. Please see specific information pulled into the AVS if appropriate.

## 2022-03-01 RX ORDER — AMLODIPINE BESYLATE 10 MG/1
10 TABLET ORAL DAILY
Qty: 90 TABLET | Refills: 0 | Status: SHIPPED | OUTPATIENT
Start: 2022-03-01 | End: 2022-10-21

## 2022-04-01 RX ORDER — MIRTAZAPINE 30 MG/1
30 TABLET, FILM COATED ORAL NIGHTLY
Qty: 90 TABLET | Refills: 0 | Status: SHIPPED | OUTPATIENT
Start: 2022-04-01 | End: 2022-10-21

## 2022-05-01 ENCOUNTER — HOSPITAL ENCOUNTER (EMERGENCY)
Facility: HOSPITAL | Age: 72
Discharge: HOME | End: 2022-05-01
Attending: EMERGENCY MEDICINE | Admitting: EMERGENCY MEDICINE

## 2022-05-01 ENCOUNTER — APPOINTMENT (OUTPATIENT)
Dept: GENERAL RADIOLOGY | Facility: HOSPITAL | Age: 72
End: 2022-05-01

## 2022-05-01 ENCOUNTER — APPOINTMENT (OUTPATIENT)
Dept: CT IMAGING | Facility: HOSPITAL | Age: 72
End: 2022-05-01

## 2022-05-01 VITALS
BODY MASS INDEX: 25.81 KG/M2 | RESPIRATION RATE: 25 BRPM | OXYGEN SATURATION: 92 % | SYSTOLIC BLOOD PRESSURE: 109 MMHG | WEIGHT: 136.69 LBS | HEART RATE: 98 BPM | TEMPERATURE: 98.1 F | DIASTOLIC BLOOD PRESSURE: 66 MMHG | HEIGHT: 61 IN

## 2022-05-01 DIAGNOSIS — F41.1 ANXIETY REACTION: Primary | ICD-10-CM

## 2022-05-01 DIAGNOSIS — R53.1 GENERALIZED WEAKNESS: ICD-10-CM

## 2022-05-01 LAB
ALBUMIN SERPL-MCNC: 4.2 G/DL (ref 3.5–5.2)
ALBUMIN/GLOB SERPL: 1.8 G/DL
ALP SERPL-CCNC: 78 U/L (ref 39–117)
ALT SERPL W P-5'-P-CCNC: 5 U/L (ref 1–33)
AMPHET+METHAMPHET UR QL: NEGATIVE
ANION GAP SERPL CALCULATED.3IONS-SCNC: 11.5 MMOL/L (ref 5–15)
AST SERPL-CCNC: 15 U/L (ref 1–32)
BARBITURATES UR QL SCN: NEGATIVE
BASOPHILS # BLD AUTO: 0.04 10*3/MM3 (ref 0–0.2)
BASOPHILS NFR BLD AUTO: 0.4 % (ref 0–1.5)
BENZODIAZ UR QL SCN: NEGATIVE
BILIRUB SERPL-MCNC: 0.2 MG/DL (ref 0–1.2)
BILIRUB UR QL STRIP: NEGATIVE
BUN SERPL-MCNC: 23 MG/DL (ref 8–23)
BUN/CREAT SERPL: 22.3 (ref 7–25)
CALCIUM SPEC-SCNC: 9.8 MG/DL (ref 8.6–10.5)
CANNABINOIDS SERPL QL: NEGATIVE
CHLORIDE SERPL-SCNC: 102 MMOL/L (ref 98–107)
CK MB SERPL-CCNC: 4.14 NG/ML
CK SERPL-CCNC: 144 U/L (ref 20–180)
CLARITY UR: CLEAR
CO2 SERPL-SCNC: 24.5 MMOL/L (ref 22–29)
COCAINE UR QL: NEGATIVE
COLOR UR: YELLOW
CREAT SERPL-MCNC: 1.03 MG/DL (ref 0.57–1)
D-LACTATE SERPL-SCNC: 0.7 MMOL/L (ref 0.5–2)
DEPRECATED RDW RBC AUTO: 41.9 FL (ref 37–54)
EGFRCR SERPLBLD CKD-EPI 2021: 58.3 ML/MIN/1.73
EOSINOPHIL # BLD AUTO: 0.11 10*3/MM3 (ref 0–0.4)
EOSINOPHIL NFR BLD AUTO: 1.2 % (ref 0.3–6.2)
ERYTHROCYTE [DISTWIDTH] IN BLOOD BY AUTOMATED COUNT: 12 % (ref 12.3–15.4)
ETHANOL BLD-MCNC: <10 MG/DL (ref 0–10)
ETHANOL UR QL: <0.01 %
GLOBULIN UR ELPH-MCNC: 2.3 GM/DL
GLUCOSE SERPL-MCNC: 107 MG/DL (ref 65–99)
GLUCOSE UR STRIP-MCNC: NEGATIVE MG/DL
HCT VFR BLD AUTO: 47.3 % (ref 34–46.6)
HGB BLD-MCNC: 15.9 G/DL (ref 12–15.9)
HGB UR QL STRIP.AUTO: NEGATIVE
HOLD SPECIMEN: NORMAL
HOLD SPECIMEN: NORMAL
IMM GRANULOCYTES # BLD AUTO: 0.03 10*3/MM3 (ref 0–0.05)
IMM GRANULOCYTES NFR BLD AUTO: 0.3 % (ref 0–0.5)
KETONES UR QL STRIP: NEGATIVE
LEUKOCYTE ESTERASE UR QL STRIP.AUTO: NEGATIVE
LIPASE SERPL-CCNC: 25 U/L (ref 13–60)
LYMPHOCYTES # BLD AUTO: 1.63 10*3/MM3 (ref 0.7–3.1)
LYMPHOCYTES NFR BLD AUTO: 17.7 % (ref 19.6–45.3)
MAGNESIUM SERPL-MCNC: 2.1 MG/DL (ref 1.6–2.4)
MCH RBC QN AUTO: 31.7 PG (ref 26.6–33)
MCHC RBC AUTO-ENTMCNC: 33.6 G/DL (ref 31.5–35.7)
MCV RBC AUTO: 94.4 FL (ref 79–97)
METHADONE UR QL SCN: NEGATIVE
MONOCYTES # BLD AUTO: 0.68 10*3/MM3 (ref 0.1–0.9)
MONOCYTES NFR BLD AUTO: 7.4 % (ref 5–12)
NEUTROPHILS NFR BLD AUTO: 6.73 10*3/MM3 (ref 1.7–7)
NEUTROPHILS NFR BLD AUTO: 73 % (ref 42.7–76)
NITRITE UR QL STRIP: NEGATIVE
NRBC BLD AUTO-RTO: 0 /100 WBC (ref 0–0.2)
NT-PROBNP SERPL-MCNC: 456.5 PG/ML (ref 0–900)
OPIATES UR QL: NEGATIVE
OXYCODONE UR QL SCN: NEGATIVE
PH UR STRIP.AUTO: 5.5 [PH] (ref 5–8)
PLATELET # BLD AUTO: 170 10*3/MM3 (ref 140–450)
PMV BLD AUTO: 9.5 FL (ref 6–12)
POTASSIUM SERPL-SCNC: 4.1 MMOL/L (ref 3.5–5.2)
PROT SERPL-MCNC: 6.5 G/DL (ref 6–8.5)
PROT UR QL STRIP: NEGATIVE
RBC # BLD AUTO: 5.01 10*6/MM3 (ref 3.77–5.28)
SARS-COV-2 RNA RESP QL NAA+PROBE: NOT DETECTED
SODIUM SERPL-SCNC: 138 MMOL/L (ref 136–145)
SP GR UR STRIP: 1.01 (ref 1–1.03)
TROPONIN I SERPL-MCNC: 0.01 NG/ML (ref 0–0.6)
UROBILINOGEN UR QL STRIP: NORMAL
WBC NRBC COR # BLD: 9.22 10*3/MM3 (ref 3.4–10.8)
WHOLE BLOOD HOLD SPECIMEN: NORMAL
WHOLE BLOOD HOLD SPECIMEN: NORMAL

## 2022-05-01 PROCEDURE — 83605 ASSAY OF LACTIC ACID: CPT | Performed by: EMERGENCY MEDICINE

## 2022-05-01 PROCEDURE — 96374 THER/PROPH/DIAG INJ IV PUSH: CPT

## 2022-05-01 PROCEDURE — 25010000002 HYDRALAZINE PER 20 MG: Performed by: EMERGENCY MEDICINE

## 2022-05-01 PROCEDURE — 80307 DRUG TEST PRSMV CHEM ANLYZR: CPT | Performed by: EMERGENCY MEDICINE

## 2022-05-01 PROCEDURE — 82553 CREATINE MB FRACTION: CPT | Performed by: EMERGENCY MEDICINE

## 2022-05-01 PROCEDURE — 96361 HYDRATE IV INFUSION ADD-ON: CPT

## 2022-05-01 PROCEDURE — 80053 COMPREHEN METABOLIC PANEL: CPT | Performed by: EMERGENCY MEDICINE

## 2022-05-01 PROCEDURE — 36415 COLL VENOUS BLD VENIPUNCTURE: CPT

## 2022-05-01 PROCEDURE — 96372 THER/PROPH/DIAG INJ SC/IM: CPT

## 2022-05-01 PROCEDURE — 87040 BLOOD CULTURE FOR BACTERIA: CPT | Performed by: EMERGENCY MEDICINE

## 2022-05-01 PROCEDURE — 85025 COMPLETE CBC W/AUTO DIFF WBC: CPT | Performed by: EMERGENCY MEDICINE

## 2022-05-01 PROCEDURE — 25010000002 ZIPRASIDONE MESYLATE PER 10 MG: Performed by: EMERGENCY MEDICINE

## 2022-05-01 PROCEDURE — U0005 INFEC AGEN DETEC AMPLI PROBE: HCPCS | Performed by: EMERGENCY MEDICINE

## 2022-05-01 PROCEDURE — 83735 ASSAY OF MAGNESIUM: CPT | Performed by: EMERGENCY MEDICINE

## 2022-05-01 PROCEDURE — 70450 CT HEAD/BRAIN W/O DYE: CPT

## 2022-05-01 PROCEDURE — 84484 ASSAY OF TROPONIN QUANT: CPT

## 2022-05-01 PROCEDURE — 82077 ASSAY SPEC XCP UR&BREATH IA: CPT | Performed by: EMERGENCY MEDICINE

## 2022-05-01 PROCEDURE — U0003 INFECTIOUS AGENT DETECTION BY NUCLEIC ACID (DNA OR RNA); SEVERE ACUTE RESPIRATORY SYNDROME CORONAVIRUS 2 (SARS-COV-2) (CORONAVIRUS DISEASE [COVID-19]), AMPLIFIED PROBE TECHNIQUE, MAKING USE OF HIGH THROUGHPUT TECHNOLOGIES AS DESCRIBED BY CMS-2020-01-R: HCPCS | Performed by: EMERGENCY MEDICINE

## 2022-05-01 PROCEDURE — 93010 ELECTROCARDIOGRAM REPORT: CPT | Performed by: SPECIALIST

## 2022-05-01 PROCEDURE — 74177 CT ABD & PELVIS W/CONTRAST: CPT

## 2022-05-01 PROCEDURE — 0 IOPAMIDOL PER 1 ML: Performed by: EMERGENCY MEDICINE

## 2022-05-01 PROCEDURE — 93005 ELECTROCARDIOGRAM TRACING: CPT

## 2022-05-01 PROCEDURE — 83880 ASSAY OF NATRIURETIC PEPTIDE: CPT | Performed by: EMERGENCY MEDICINE

## 2022-05-01 PROCEDURE — 93005 ELECTROCARDIOGRAM TRACING: CPT | Performed by: EMERGENCY MEDICINE

## 2022-05-01 PROCEDURE — 83690 ASSAY OF LIPASE: CPT | Performed by: EMERGENCY MEDICINE

## 2022-05-01 PROCEDURE — 71045 X-RAY EXAM CHEST 1 VIEW: CPT

## 2022-05-01 PROCEDURE — 82550 ASSAY OF CK (CPK): CPT | Performed by: EMERGENCY MEDICINE

## 2022-05-01 PROCEDURE — 99284 EMERGENCY DEPT VISIT MOD MDM: CPT

## 2022-05-01 PROCEDURE — 81003 URINALYSIS AUTO W/O SCOPE: CPT | Performed by: EMERGENCY MEDICINE

## 2022-05-01 RX ORDER — SODIUM CHLORIDE 0.9 % (FLUSH) 0.9 %
10 SYRINGE (ML) INJECTION AS NEEDED
Status: DISCONTINUED | OUTPATIENT
Start: 2022-05-01 | End: 2022-05-01 | Stop reason: HOSPADM

## 2022-05-01 RX ORDER — LISINOPRIL 10 MG/1
20 TABLET ORAL ONCE
Status: COMPLETED | OUTPATIENT
Start: 2022-05-01 | End: 2022-05-01

## 2022-05-01 RX ORDER — ZIPRASIDONE MESYLATE 20 MG/ML
10 INJECTION, POWDER, LYOPHILIZED, FOR SOLUTION INTRAMUSCULAR ONCE
Status: COMPLETED | OUTPATIENT
Start: 2022-05-01 | End: 2022-05-01

## 2022-05-01 RX ORDER — HYDRALAZINE HYDROCHLORIDE 20 MG/ML
20 INJECTION INTRAMUSCULAR; INTRAVENOUS ONCE
Status: COMPLETED | OUTPATIENT
Start: 2022-05-01 | End: 2022-05-01

## 2022-05-01 RX ORDER — ASPIRIN 81 MG/1
324 TABLET, CHEWABLE ORAL ONCE
Status: DISCONTINUED | OUTPATIENT
Start: 2022-05-01 | End: 2022-05-01 | Stop reason: HOSPADM

## 2022-05-01 RX ADMIN — SODIUM CHLORIDE 1000 ML: 9 INJECTION, SOLUTION INTRAVENOUS at 13:58

## 2022-05-01 RX ADMIN — IOPAMIDOL 100 ML: 755 INJECTION, SOLUTION INTRAVENOUS at 13:56

## 2022-05-01 RX ADMIN — HYDRALAZINE HYDROCHLORIDE 20 MG: 20 INJECTION, SOLUTION INTRAMUSCULAR; INTRAVENOUS at 16:17

## 2022-05-01 RX ADMIN — LISINOPRIL 20 MG: 10 TABLET ORAL at 16:19

## 2022-05-01 RX ADMIN — ZIPRASIDONE MESYLATE 10 MG: 20 INJECTION, POWDER, LYOPHILIZED, FOR SOLUTION INTRAMUSCULAR at 16:54

## 2022-05-01 NOTE — ED PROVIDER NOTES
Time: 1:16 PM EDT  Arrived by: ambulance  Chief Complaint: CP, Fall  History provided by: Patient, daughter  History is limited by: N/A     History of Present Illness:  Patient is a 72 y.o. female that presents to the emergency department with a fall today at 1000. Pt leaned against the bedside table, she slipped, then fell forward on hardwood floor. EMS brought her into the ED. She has has generalized weakness and confusion for a few days. She also reports sharp left sided abd pain that is worse when she pees. She has not had constipation or diarrhea. She has had 3 episodes of vomiting today. She reports a fever but is unsure how high.  Pt also reports that she feels suicidal and has thought about self harm. She denies plan for self harm.       History provided by:  Patient and relative  History limited by:  Mental status change   used: No        Similar Symptoms Previously: N/A  Recently seen: N/A      Patient Care Team  Primary Care Provider: Erich Joya DO    Past Medical History:     Allergies   Allergen Reactions   • Tylenol With Codeine #3 [Acetaminophen-Codeine] Unknown - High Severity     Past Medical History:   Diagnosis Date   • Anxiety 05/23/2018    PATIENT REPORTS A LONG HISTORY OF ANXIETY ALONG IWTH AGOURA PHOBIA. SHE HAS BEEN PREVIOUSLY BEEN SEEN BY PSYCH RECENTLY., HER XANAX WAS STOPPED. I WILL REFER THE PATIENT FOR EVALUATION BY MENTAL HEALTH SPECIALIST   • Asthma    • Cataract    • COPD (chronic obstructive pulmonary disease) (HCC)    • Depression    • GERD (gastroesophageal reflux disease)    • Hypertension    • IBS (irritable bowel syndrome)    • Low back pain      Past Surgical History:   Procedure Laterality Date   • ABDOMINAL SURGERY     • COLONOSCOPY     • GALLBLADDER SURGERY     • HYSTERECTOMY     • VASCULAR SURGERY       Family History   Problem Relation Age of Onset   • Stroke Other    • Heart disease Other    • Hypertension Other    • Cancer Other    • Diabetes  "Other        Home Medications:  Prior to Admission medications    Medication Sig Start Date End Date Taking? Authorizing Provider   albuterol sulfate HFA (Ventolin HFA) 108 (90 Base) MCG/ACT inhaler Inhale 1 puff Every 6 (Six) Hours. 2/25/22   Erich Joya DO   amLODIPine (NORVASC) 10 MG tablet TAKE 1 TABLET BY MOUTH DAILY 3/1/22   Erich Joya DO   hydrOXYzine (ATARAX) 50 MG tablet Take 1 tablet by mouth 3 (Three) Times a Day As Needed for Itching. 2/25/22   Erich Joya DO   lisinopril (PRINIVIL,ZESTRIL) 20 MG tablet Take 1 tablet by mouth Daily. 2/25/22   Erich Joya DO   mirtazapine (REMERON) 30 MG tablet TAKE 1 TABLET BY MOUTH EVERY NIGHT 4/1/22   Erich Joya DO   polyethylene glycol (MIRALAX) 17 g packet Take 17 g by mouth Daily. 12/17/21   Martin Betts MD   pravastatin (PRAVACHOL) 40 MG tablet Take 1 tablet by mouth Daily. 2/25/22   Erich Joya DO   umeclidinium-vilanterol (Anoro Ellipta) 62.5-25 MCG/INH aerosol powder  inhaler Inhale 1 puff Daily. 2/25/22   Erich Joya DO        Social History:   Social History     Tobacco Use   • Smoking status: Current Every Day Smoker   • Smokeless tobacco: Never Used   Substance Use Topics   • Alcohol use: Never   • Drug use: Never     Recent travel: not applicable     Review of Systems:  Review of Systems   Unable to perform ROS: Mental status change   Constitutional: Positive for fever.   Gastrointestinal: Positive for abdominal pain and vomiting. Negative for constipation, diarrhea and nausea.   Neurological: Positive for weakness.   Psychiatric/Behavioral: Positive for confusion and suicidal ideas.        Physical Exam:  /66 (BP Location: Left arm, Patient Position: Lying)   Pulse 98   Temp 98.1 °F (36.7 °C) (Oral)   Resp 25   Ht 154.9 cm (61\")   Wt 62 kg (136 lb 11 oz)   SpO2 92%   BMI 25.83 kg/m²     Physical Exam  Vitals and nursing note reviewed.   Constitutional:       General: She is not in acute distress.     Appearance: Normal " appearance.   HENT:      Head: Normocephalic and atraumatic.      Nose: Nose normal.   Eyes:      General: No scleral icterus.  Cardiovascular:      Rate and Rhythm: Regular rhythm. Tachycardia present.      Heart sounds: Normal heart sounds.   Pulmonary:      Effort: Pulmonary effort is normal. No respiratory distress.      Breath sounds: Normal breath sounds.   Abdominal:      Palpations: Abdomen is soft.      Tenderness: There is no abdominal tenderness.   Musculoskeletal:         General: Normal range of motion.      Cervical back: Neck supple.      Right lower leg: No edema.      Left lower leg: No edema.   Skin:     General: Skin is warm and dry.   Neurological:      General: No focal deficit present.      Mental Status: She is alert. She is confused.      Sensory: No sensory deficit.      Motor: No weakness.                Medications in the Emergency Department:  Medications   sodium chloride 0.9 % bolus 1,000 mL (0 mL Intravenous Stopped 5/1/22 1605)   iopamidol (ISOVUE-370) 76 % injection 100 mL (100 mL Intravenous Given 5/1/22 1356)   lisinopril (PRINIVIL,ZESTRIL) tablet 20 mg (20 mg Oral Given 5/1/22 1619)   hydrALAZINE (APRESOLINE) injection 20 mg (20 mg Intravenous Given 5/1/22 1617)   ziprasidone (GEODON) injection 10 mg (10 mg Intramuscular Given 5/1/22 1654)        Labs  Lab Results (last 24 hours)     Procedure Component Value Units Date/Time    CBC & Differential [[id number]]  (Abnormal) Collected: 05/01/22 1244    Specimen: Blood Updated: 05/01/22 1304    Narrative:      The following orders were created for panel order CBC & Differential.  Procedure                               Abnormality         Status                     ---------                               -----------         ------                     CBC Auto Differential[[id number]]        Abnormal            Final result                 Please view results for these tests on the individual orders.    Comprehensive Metabolic Panel  [[id number]]  (Abnormal) Collected: 05/01/22 1244    Specimen: Blood Updated: 05/01/22 1323     Glucose 107 mg/dL      BUN 23 mg/dL      Creatinine 1.03 mg/dL      Sodium 138 mmol/L      Potassium 4.1 mmol/L      Comment: Slight hemolysis detected by analyzer. Results may be affected.        Chloride 102 mmol/L      CO2 24.5 mmol/L      Calcium 9.8 mg/dL      Total Protein 6.5 g/dL      Albumin 4.20 g/dL      ALT (SGPT) 5 U/L      AST (SGOT) 15 U/L      Alkaline Phosphatase 78 U/L      Total Bilirubin 0.2 mg/dL      Globulin 2.3 gm/dL      A/G Ratio 1.8 g/dL      BUN/Creatinine Ratio 22.3     Anion Gap 11.5 mmol/L      eGFR 58.3 mL/min/1.73      Comment: National Kidney Foundation and American Society of Nephrology (ASN) Task Force recommended calculation based on the Chronic Kidney Disease Epidemiology Collaboration (CKD-EPI) equation refit without adjustment for race.       Narrative:      GFR Normal >60  Chronic Kidney Disease <60  Kidney Failure <15      Lactic Acid, Plasma [[id number]]  (Normal) Collected: 05/01/22 1244    Specimen: Blood Updated: 05/01/22 1321     Lactate 0.7 mmol/L     Blood Culture - Blood, Arm, Right [[id number]] Collected: 05/01/22 1244    Specimen: Blood from Arm, Right Updated: 05/01/22 1304    Lipase [[id number]]  (Normal) Collected: 05/01/22 1244    Specimen: Blood Updated: 05/01/22 1323     Lipase 25 U/L     BNP [[id number]]  (Normal) Collected: 05/01/22 1244    Specimen: Blood Updated: 05/01/22 1321     proBNP 456.5 pg/mL     Narrative:      Among patients with dyspnea, NT-proBNP is highly sensitive for the detection of acute congestive heart failure. In addition NT-proBNP of <300 pg/ml effectively rules out acute congestive heart failure with 99% negative predictive value.    Results may be falsely decreased if patient taking Biotin.      Magnesium [[id number]]  (Normal) Collected: 05/01/22 1244    Specimen: Blood Updated: 05/01/22 1323     Magnesium 2.1 mg/dL     CK Total & CKMB  [[id number]]  (Normal) Collected: 05/01/22 1244    Specimen: Blood Updated: 05/01/22 1323     CKMB 4.14 ng/mL      Creatine Kinase 144 U/L     Narrative:      CKMB results may be falsely decreased if patient taking Biotin.    CBC Auto Differential [[id number]]  (Abnormal) Collected: 05/01/22 1244    Specimen: Blood Updated: 05/01/22 1304     WBC 9.22 10*3/mm3      RBC 5.01 10*6/mm3      Hemoglobin 15.9 g/dL      Hematocrit 47.3 %      MCV 94.4 fL      MCH 31.7 pg      MCHC 33.6 g/dL      RDW 12.0 %      RDW-SD 41.9 fl      MPV 9.5 fL      Platelets 170 10*3/mm3      Neutrophil % 73.0 %      Lymphocyte % 17.7 %      Monocyte % 7.4 %      Eosinophil % 1.2 %      Basophil % 0.4 %      Immature Grans % 0.3 %      Neutrophils, Absolute 6.73 10*3/mm3      Lymphocytes, Absolute 1.63 10*3/mm3      Monocytes, Absolute 0.68 10*3/mm3      Eosinophils, Absolute 0.11 10*3/mm3      Basophils, Absolute 0.04 10*3/mm3      Immature Grans, Absolute 0.03 10*3/mm3      nRBC 0.0 /100 WBC     Ethanol [[id number]] Collected: 05/01/22 1244    Specimen: Blood Updated: 05/01/22 1345     Ethanol <10 mg/dL      Ethanol % <0.010 %     Narrative:      Ethanol (Plasma)  <10 Essentially Negative    Toxic Concentrations           mg/dL    Flushing, slowing of reflexes    Impaired visual activity         Depression of CNS              >100  Possible Coma                  >300       Blood Culture - Blood, Hand, Left [[id number]] Collected: 05/01/22 1250    Specimen: Blood from Hand, Left Updated: 05/01/22 1302    POC Troponin I [[id number]]  (Normal) Collected: 05/01/22 1258    Specimen: Blood Updated: 05/01/22 1310     Troponin I 0.01 ng/mL      Comment: Serial Number: [id number]:  [id number]       COVID-19,CEPHEID/NATACHA,COR/JADE/PAD/KYLE IN-HOUSE(OR EMERGENT/ADD-ON),NP SWAB IN TRANSPORT MEDIA 3-4 HR TAT, RT-PCR - Swab, Nasopharynx [[id number]]  (Normal) Collected: 05/01/22 1358    Specimen: Swab from Nasopharynx Updated: 05/01/22 1504      COVID19 Not Detected    Narrative:      Fact sheet for providers: https://www.fda.gov/media/190973/download     Fact sheet for patients: https://www.fda.gov/media/401142/download  Fact sheet for providers: https://www.fda.gov/media/994098/download     Fact sheet for patients: https://www.fda.gov/media/340090/download    Urine Drug Screen - Urine, Clean Catch [[id number]]  (Normal) Collected: 05/01/22 1402    Specimen: Urine, Clean Catch Updated: 05/01/22 1451     Amphet/Methamphet, Screen Negative     Barbiturates Screen, Urine Negative     Benzodiazepine Screen, Urine Negative     Cocaine Screen, Urine Negative     Opiate Screen Negative     THC, Screen, Urine Negative     Methadone Screen, Urine Negative     Oxycodone Screen, Urine Negative    Narrative:      Negative Thresholds Per Drugs Screened:    Amphetamines                 500 ng/ml  Barbiturates                 200 ng/ml  Benzodiazepines              100 ng/ml  Cocaine                      300 ng/ml  Methadone                    300 ng/ml  Opiates                      300 ng/ml  Oxycodone                    100 ng/ml  THC                           50 ng/ml    The Normal Value for all drugs tested is negative. This report includes final unconfirmed screening results to be used for medical treatment purposes only. Unconfirmed results must not be used for non-medical purposes such as employment or legal testing. Clinical consideration should be applied to any drug of abuse test, particularly when unconfirmed results are used.            Urinalysis With Culture If Indicated - Urine, Clean Catch [[id number]]  (Normal) Collected: 05/01/22 1402    Specimen: Urine, Clean Catch Updated: 05/01/22 1419     Color, UA Yellow     Appearance, UA Clear     pH, UA 5.5     Specific Gravity, UA 1.006     Glucose, UA Negative     Ketones, UA Negative     Bilirubin, UA Negative     Blood, UA Negative     Protein, UA Negative     Leuk Esterase, UA Negative     Nitrite, UA  Negative     Urobilinogen, UA 0.2 E.U./dL    Narrative:      Urine microscopic not indicated.           Imaging:  CT Head Without Contrast    Result Date: 5/1/2022  PROCEDURE: CT HEAD WO CONTRAST  COMPARISON:  [facility], CT, HEAD W/O CONTRAST, 12/03/2018, 18:47. INDICATIONS: trauma  PROTOCOL:   Standard imaging protocol performed    RADIATION:   DLP: 1081.2mGy*cm   MA and/or KV was adjusted to minimize radiation dose.     TECHNIQUE: After obtaining the patient's consent, CT images were obtained without non-ionic intravenous contrast material.  FINDINGS:  There is a chronic appearing lacunar infarct in the left thalamus.  There is severe low density in the cerebral white matter consistent with small vessel ischemic disease.  No intracranial hemorrhage is seen.  No mass or mass-effect is evident.  The ventricles are normal in size and configuration.  No focal calvarial abnormality is seen.  No fracture is evident.        1. Chronic appearing left thalamic lacunar infarct 2. Severe small vessel ischemic changes in the white matter 3. No calvarial fracture or intracranial hemorrhage     Jermaine Biggs M.D.       Electronically Signed and Approved By: Jermaine Biggs M.D. on 5/01/2022 at 14:19             CT Abdomen Pelvis With Contrast    Result Date: 5/1/2022  PROCEDURE: CT ABDOMEN PELVIS W CONTRAST  COMPARISON: [facility], CT, ABDOMEN PELVIS W/WO CHEST W/O CONTRAST, 12/04/2018, 14:43.  INDICATIONS: Fall, confusion, abdominal pain  TECHNIQUE: After obtaining the patient's consent, CT images were created with non-ionic intravenous contrast material.   PROTOCOL:   Standard imaging protocol performed    RADIATION:   DLP: 616.4mGy*cm   Automated exposure control was utilized to minimize radiation dose. CONTRAST: 100cc Isovue 370 I.V.  FINDINGS:  The lung bases are clear bilaterally.  The patient has an infrarenal abdominal aortic aneurysm which measures 5.3 cm AP.  The aneurysm has been repaired  utilizing a bifurcated aortic endograft.  No endoleak is identified.  The graft remains patent.  There is no evidence of leak or rupture.  A cholecystectomy has been performed.  A 2.0 cm right hepatic lobe cyst is noted.  The left hepatic lobe is a stable 0.7 cm hypodense focus, also compatible with a cyst.  The spleen, pancreas appear unremarkable.  There is bulky enlargement of both adrenal glands which may represent multiple adenomas and or hyperplasia, unchanged.  There is moderate to severe left renal atrophy, a new finding.  Multiple bilateral renal cysts are noted.  No adenopathy or free fluid is seen in the abdomen or pelvis.  Immediately inferior to the umbilicus is a bowel containing ventral hernia measuring 1.9 cm transverse.  There is no evidence of bowel strangulation or incarceration.  The urinary bladder appears normal.  A hysterectomy has been performed.  The adnexal regions appear unremarkable.  Colonic diverticulosis is noted.  No fracture or malalignment is demonstrated.        1. Infra renal abdominal aortic aneurysm measuring 5.3 cm AP status post repair with a bifurcated endograft.  No evidence of aneurysm rupture or endoleak. 2. Previous cholecystectomy and hysterectomy 3. Multiple a Paddock and renal cysts 4. Bilateral adrenal adenomas versus hyperplasia 5. Moderate to severe left renal atrophy, new in the interval 6. 1.9 cm paraumbilical hernia containing nonobstructed bowel     Jermaine Biggs M.D.       Electronically Signed and Approved By: Jermaine Biggs M.D. on 5/01/2022 at 15:46             XR Chest 1 View    Result Date: 5/1/2022  PROCEDURE: XR CHEST 1 VW  COMPARISON: [facility], CR, CHEST PA/AP & LAT 2V, 11/17/2018, 20:11.  [facility], CT, ABDOMEN PELVIS W/WO CHEST W/O CONTRAST, 12/04/2018, 14:43.  [facility], CR, XR CHEST 1 VW, 12/17/2021, 3:38.  INDICATIONS: CHEST PAIN TODAY  FINDINGS:  A portion of the left hemidiaphragm is obscured.  The  right lung is clear.  The cardiac and mediastinal silhouettes appear normal.  No effusion is seen.        1. Obscuration of a portion of the left hemidiaphragm.  A small hiatal hernia or left basilar consolidation is not excluded.  This could be further evaluated on the scheduled abdomen CT.       Jermaine Biggs M.D.       Electronically Signed and Approved By: Jermaine Biggs M.D. on 5/01/2022 at 14:24               Procedures:  Procedures    Progress  ED Course as of 05/01/22 2342   Sun May 01, 2022   1317 EKG interpretation: Normal sinus tachycardia, heart rate 104, normal VA and QT intervals, normal QRS duration, normal axis, nonspecific ST segment changes with no acute ischemia. [RP]      ED Course User Index  [RP] Patricio Davis MD                            Medical Decision Making:  MDM  Number of Diagnoses or Management Options  Anxiety reaction: established and worsening  Generalized weakness: established and worsening     Amount and/or Complexity of Data Reviewed  Clinical lab tests: reviewed  Tests in the radiology section of CPT®: reviewed  Tests in the medicine section of CPT®: reviewed  Decide to obtain previous medical records or to obtain history from someone other than the patient: yes    Risk of Complications, Morbidity, and/or Mortality  Presenting problems: moderate  Management options: low    Patient Progress  Patient progress: stable       Final diagnoses:   Anxiety reaction   Generalized weakness        Disposition:  ED Disposition     ED Disposition   Discharge    Condition   Stable    Comment   --             Documentation assistance provided by Jarod Medina acting as scribe for Dr. Patricio Davis MD. Information recorded by the scribe was done at my direction and has been verified and validated by me.          Jarod Medina  05/01/22 6415       Patricio Davis MD  05/01/22 1150

## 2022-05-01 NOTE — SIGNIFICANT NOTE
05/01/22 1916   Plan   Final Note SW notified that ED consult needed. SW updated after consult and pt is able to discharge home per [facility] once medically cleared this date.

## 2022-05-02 LAB
QT INTERVAL: 363 MS
QT INTERVAL: 363 MS

## 2022-05-02 RX ORDER — PRAVASTATIN SODIUM 40 MG
40 TABLET ORAL DAILY
Qty: 90 TABLET | Refills: 0 | Status: SHIPPED | OUTPATIENT
Start: 2022-05-02 | End: 2022-09-20 | Stop reason: SDUPTHER

## 2022-05-02 RX ORDER — LISINOPRIL 20 MG/1
20 TABLET ORAL DAILY
Qty: 90 TABLET | Refills: 0 | Status: SHIPPED | OUTPATIENT
Start: 2022-05-02 | End: 2022-09-20 | Stop reason: SDUPTHER

## 2022-05-06 LAB
BACTERIA SPEC AEROBE CULT: NORMAL
BACTERIA SPEC AEROBE CULT: NORMAL

## 2022-09-19 RX ORDER — ALBUTEROL SULFATE 90 UG/1
AEROSOL, METERED RESPIRATORY (INHALATION)
Qty: 8.5 G | Refills: 0 | Status: SHIPPED | OUTPATIENT
Start: 2022-09-19 | End: 2022-10-21 | Stop reason: SDUPTHER

## 2022-09-20 RX ORDER — LISINOPRIL 20 MG/1
20 TABLET ORAL DAILY
Qty: 90 TABLET | Refills: 0 | Status: SHIPPED | OUTPATIENT
Start: 2022-09-20 | End: 2022-10-21 | Stop reason: SDUPTHER

## 2022-09-20 RX ORDER — PRAVASTATIN SODIUM 40 MG
40 TABLET ORAL DAILY
Qty: 90 TABLET | Refills: 0 | Status: SHIPPED | OUTPATIENT
Start: 2022-09-20 | End: 2022-10-21 | Stop reason: SDUPTHER

## 2022-09-26 RX ORDER — MIRTAZAPINE 30 MG/1
30 TABLET, FILM COATED ORAL NIGHTLY
Qty: 90 TABLET | Refills: 0 | OUTPATIENT
Start: 2022-09-26

## 2022-10-21 ENCOUNTER — OFFICE VISIT (OUTPATIENT)
Dept: FAMILY MEDICINE CLINIC | Facility: CLINIC | Age: 72
End: 2022-10-21

## 2022-10-21 VITALS
DIASTOLIC BLOOD PRESSURE: 66 MMHG | HEART RATE: 114 BPM | SYSTOLIC BLOOD PRESSURE: 110 MMHG | TEMPERATURE: 98 F | WEIGHT: 126 LBS | HEIGHT: 61 IN | OXYGEN SATURATION: 94 % | BODY MASS INDEX: 23.79 KG/M2

## 2022-10-21 DIAGNOSIS — Z23 NEED FOR INFLUENZA VACCINATION: ICD-10-CM

## 2022-10-21 DIAGNOSIS — R41.89 COGNITIVE DECLINE: ICD-10-CM

## 2022-10-21 DIAGNOSIS — E11.9 NON-INSULIN DEPENDENT TYPE 2 DIABETES MELLITUS: Primary | ICD-10-CM

## 2022-10-21 DIAGNOSIS — I10 PRIMARY HYPERTENSION: ICD-10-CM

## 2022-10-21 DIAGNOSIS — J44.9 CHRONIC OBSTRUCTIVE PULMONARY DISEASE, UNSPECIFIED COPD TYPE: ICD-10-CM

## 2022-10-21 LAB
ALBUMIN SERPL-MCNC: 4.3 G/DL (ref 3.5–5.2)
ALBUMIN/GLOB SERPL: 1.6 G/DL
ALP SERPL-CCNC: 68 U/L (ref 39–117)
ALT SERPL W P-5'-P-CCNC: 7 U/L (ref 1–33)
ANION GAP SERPL CALCULATED.3IONS-SCNC: 11 MMOL/L (ref 5–15)
AST SERPL-CCNC: 15 U/L (ref 1–32)
BILIRUB SERPL-MCNC: 0.2 MG/DL (ref 0–1.2)
BUN SERPL-MCNC: 14 MG/DL (ref 8–23)
BUN/CREAT SERPL: 13.1 (ref 7–25)
CALCIUM SPEC-SCNC: 9.8 MG/DL (ref 8.6–10.5)
CHLORIDE SERPL-SCNC: 104 MMOL/L (ref 98–107)
CHOLEST SERPL-MCNC: 168 MG/DL (ref 0–200)
CO2 SERPL-SCNC: 26 MMOL/L (ref 22–29)
CREAT SERPL-MCNC: 1.07 MG/DL (ref 0.57–1)
EGFRCR SERPLBLD CKD-EPI 2021: 55.6 ML/MIN/1.73
GLOBULIN UR ELPH-MCNC: 2.7 GM/DL
GLUCOSE SERPL-MCNC: 90 MG/DL (ref 65–99)
HDLC SERPL-MCNC: 47 MG/DL (ref 40–60)
LDLC SERPL CALC-MCNC: 92 MG/DL (ref 0–100)
LDLC/HDLC SERPL: 1.86 {RATIO}
POTASSIUM SERPL-SCNC: 4.8 MMOL/L (ref 3.5–5.2)
PROT SERPL-MCNC: 7 G/DL (ref 6–8.5)
SODIUM SERPL-SCNC: 141 MMOL/L (ref 136–145)
TRIGL SERPL-MCNC: 168 MG/DL (ref 0–150)
TSH SERPL DL<=0.05 MIU/L-ACNC: 1.04 UIU/ML (ref 0.27–4.2)
VLDLC SERPL-MCNC: 29 MG/DL (ref 5–40)

## 2022-10-21 PROCEDURE — 90662 IIV NO PRSV INCREASED AG IM: CPT | Performed by: FAMILY MEDICINE

## 2022-10-21 PROCEDURE — 36415 COLL VENOUS BLD VENIPUNCTURE: CPT | Performed by: FAMILY MEDICINE

## 2022-10-21 PROCEDURE — 83036 HEMOGLOBIN GLYCOSYLATED A1C: CPT | Performed by: FAMILY MEDICINE

## 2022-10-21 PROCEDURE — G0008 ADMIN INFLUENZA VIRUS VAC: HCPCS | Performed by: FAMILY MEDICINE

## 2022-10-21 PROCEDURE — 85025 COMPLETE CBC W/AUTO DIFF WBC: CPT | Performed by: FAMILY MEDICINE

## 2022-10-21 PROCEDURE — G0009 ADMIN PNEUMOCOCCAL VACCINE: HCPCS | Performed by: FAMILY MEDICINE

## 2022-10-21 PROCEDURE — 80061 LIPID PANEL: CPT | Performed by: FAMILY MEDICINE

## 2022-10-21 PROCEDURE — 99214 OFFICE O/P EST MOD 30 MIN: CPT | Performed by: FAMILY MEDICINE

## 2022-10-21 PROCEDURE — 80053 COMPREHEN METABOLIC PANEL: CPT | Performed by: FAMILY MEDICINE

## 2022-10-21 PROCEDURE — 84443 ASSAY THYROID STIM HORMONE: CPT | Performed by: FAMILY MEDICINE

## 2022-10-21 RX ORDER — PREGABALIN 50 MG/1
CAPSULE ORAL
COMMUNITY
End: 2022-10-21

## 2022-10-21 RX ORDER — OMEPRAZOLE 40 MG/1
CAPSULE, DELAYED RELEASE ORAL
COMMUNITY
End: 2022-10-21 | Stop reason: SDUPTHER

## 2022-10-21 RX ORDER — CITALOPRAM 40 MG/1
TABLET ORAL
Status: CANCELLED | OUTPATIENT
Start: 2022-10-21

## 2022-10-21 RX ORDER — VENLAFAXINE HYDROCHLORIDE 37.5 MG/1
CAPSULE, EXTENDED RELEASE ORAL
COMMUNITY
End: 2022-10-21

## 2022-10-21 RX ORDER — HYDROCHLOROTHIAZIDE 12.5 MG/1
TABLET ORAL
COMMUNITY
End: 2022-10-21

## 2022-10-21 RX ORDER — HYDROXYZINE PAMOATE 25 MG/1
25 CAPSULE ORAL
COMMUNITY
End: 2022-10-21

## 2022-10-21 RX ORDER — CITALOPRAM 40 MG/1
TABLET ORAL
COMMUNITY
End: 2022-10-21

## 2022-10-21 RX ORDER — GABAPENTIN 300 MG/1
CAPSULE ORAL
COMMUNITY
End: 2022-10-21

## 2022-10-21 RX ORDER — BUPRENORPHINE AND NALOXONE 8; 2 MG/1; MG/1
FILM, SOLUBLE BUCCAL; SUBLINGUAL
COMMUNITY
End: 2022-10-21

## 2022-10-21 RX ORDER — OMEPRAZOLE 40 MG/1
40 CAPSULE, DELAYED RELEASE ORAL DAILY PRN
Qty: 90 CAPSULE | Refills: 1 | Status: SHIPPED | OUTPATIENT
Start: 2022-10-21 | End: 2023-01-04 | Stop reason: SDUPTHER

## 2022-10-21 RX ORDER — ALBUTEROL SULFATE 90 UG/1
1 AEROSOL, METERED RESPIRATORY (INHALATION) EVERY 6 HOURS PRN
Qty: 8.5 G | Refills: 3 | Status: SHIPPED | OUTPATIENT
Start: 2022-10-21 | End: 2023-01-04

## 2022-10-21 RX ORDER — LISINOPRIL 20 MG/1
20 TABLET ORAL DAILY
Qty: 90 TABLET | Refills: 1 | Status: SHIPPED | OUTPATIENT
Start: 2022-10-21 | End: 2023-01-04 | Stop reason: SDUPTHER

## 2022-10-21 RX ORDER — ALPRAZOLAM 0.5 MG/1
TABLET ORAL
Status: CANCELLED | OUTPATIENT
Start: 2022-10-21

## 2022-10-21 RX ORDER — ALPRAZOLAM 0.5 MG/1
TABLET ORAL
COMMUNITY
End: 2022-10-21

## 2022-10-21 RX ORDER — CITALOPRAM 20 MG/1
20 TABLET ORAL EVERY MORNING
Qty: 90 TABLET | Refills: 1 | Status: SHIPPED | OUTPATIENT
Start: 2022-10-21 | End: 2023-01-04 | Stop reason: SDUPTHER

## 2022-10-21 RX ORDER — AMLODIPINE BESYLATE 10 MG/1
TABLET ORAL
COMMUNITY
End: 2022-10-21

## 2022-10-21 RX ORDER — LORAZEPAM 0.5 MG/1
TABLET ORAL
COMMUNITY
End: 2022-10-21

## 2022-10-21 RX ORDER — POLYETHYLENE GLYCOL 3350 17 G/17G
17 POWDER, FOR SOLUTION ORAL
COMMUNITY
End: 2022-10-21

## 2022-10-21 RX ORDER — PRAVASTATIN SODIUM 40 MG
40 TABLET ORAL DAILY
Qty: 90 TABLET | Refills: 1 | Status: SHIPPED | OUTPATIENT
Start: 2022-10-21 | End: 2023-01-04 | Stop reason: SDUPTHER

## 2022-10-21 NOTE — PROGRESS NOTES
"Chief Complaint    Follow-up    Subjective      Bessy Leggett presents to [facility]    History of Present Illness - Poor Historian - Pt presents w/ her daughter    1.) NIDDM : No recent A1C on chart. Patient does not take any medications for her diagnosis.     2.) COPD : No breathing complaints during this visit. Pt admits to smoking intermittently. She admits that her children usually 'takes it away from me.'     3.) ANXIETY AND DEPRESSION : Patient reports that she does not remember taking a medication for depression. Citalopram + Wellbutrin noted on chart. Her daughter admits to patient likely taking Citalopram.    4.) HTN : Controlled. Patient reports that she only takes Lisinopril - although he medication list includes amlodipine, as well as HCTZ.    5.) COGNITIVE CHANGES : Pt's daughter reports recent cognitive changes. Onset is unclear. Patient appears to be more confused than usual. Unclear regarding memory loss.    Objective     Vital Signs:     /66 (BP Location: Right arm, Patient Position: Sitting, Cuff Size: Adult)   Pulse 114   Temp 98 °F (36.7 °C) (Temporal)   Ht 154.9 cm (61\")   Wt 57.2 kg (126 lb)   SpO2 94%   BMI 23.81 kg/m²       Physical Exam  Vitals reviewed.   Constitutional:       General: She is not in acute distress.     Appearance: She is well-developed.      Comments: Chronically ill-appearing   HENT:      Head: Normocephalic and atraumatic.      Right Ear: Hearing and external ear normal.      Left Ear: Hearing and external ear normal.      Nose: Nose normal.   Eyes:      General: Lids are normal.         Right eye: No discharge.         Left eye: No discharge.      Conjunctiva/sclera: Conjunctivae normal.   Cardiovascular:      Rate and Rhythm: Normal rate.   Pulmonary:      Effort: Pulmonary effort is normal.      Breath sounds: Decreased air movement present.   Abdominal:      General: There is no distension.   Musculoskeletal:         " General: No swelling.      Cervical back: Neck supple.   Skin:     Coloration: Skin is not jaundiced.      Findings: No erythema.   Neurological:      Mental Status: She is alert. Mental status is at baseline.   Psychiatric:         Speech: Speech is tangential.     Assessment and Plan     Diagnoses and all orders for this visit:    1. Non-insulin dependent type 2 diabetes mellitus (HCC) (Primary)  Comments:  Labs as noted. Additional recs per review.  Orders:  -     Hemoglobin A1c  -     Lipid Panel  -     CBC & Differential  -     Comprehensive Metabolic Panel  -     Microalbumin / Creatinine Urine Ratio - Urine, Clean Catch  -     TSH    2. Need for influenza vaccination  Comments:  Administered.    3. Primary hypertension  Comments:  Lisinopril refilled as noted.    4. Chronic obstructive pulmonary disease, unspecified COPD type (HCC)  Comments:  Remain on medications as refilled.     5. Cognitive decline  Comments:  MRI as noted for initial imaging. Additional recs per review.  Orders:  -     MRI Brain Without Contrast; Future    Other orders  -     omeprazole (priLOSEC) 40 MG capsule; Take 1 capsule by mouth Daily As Needed (GERD).  Dispense: 90 capsule; Refill: 1  -     lisinopril (PRINIVIL,ZESTRIL) 20 MG tablet; Take 1 tablet by mouth Daily.  Dispense: 90 tablet; Refill: 1  -     pravastatin (PRAVACHOL) 40 MG tablet; Take 1 tablet by mouth Daily.  Dispense: 90 tablet; Refill: 1  -     albuterol sulfate  (90 Base) MCG/ACT inhaler; Inhale 1 puff Every 6 (Six) Hours As Needed for Wheezing or Shortness of Air.  Dispense: 8.5 g; Refill: 3  -     umeclidinium-vilanterol (Anoro Ellipta) 62.5-25 MCG/INH aerosol powder  inhaler; Inhale 1 puff Daily.  Dispense: 60 each; Refill: 2  -     citalopram (CeleXA) 20 MG tablet; Take 1 tablet by mouth Every Morning for 90 days.  Dispense: 90 tablet; Refill: 1  -     Fluzone High-Dose 65+yrs ([id number])    Follow Up     Return in about 2 weeks (around 11/4/2022) for Next  scheduled follow up, Video visit.    Patient was given instructions and counseling regarding her condition or for health maintenance advice. Please see specific information pulled into the AVS if appropriate.

## 2022-10-21 NOTE — PROGRESS NOTES
Venipuncture Blood Specimen Collection  Venipuncture performed in left arm by Candelaria Frey with good hemostasis. Patient tolerated the procedure well without complications.   10/21/22   Candelaria Frey

## 2022-10-22 LAB
BASOPHILS # BLD AUTO: 0.05 10*3/MM3 (ref 0–0.2)
BASOPHILS NFR BLD AUTO: 0.6 % (ref 0–1.5)
DEPRECATED RDW RBC AUTO: 46.6 FL (ref 37–54)
EOSINOPHIL # BLD AUTO: 0.15 10*3/MM3 (ref 0–0.4)
EOSINOPHIL NFR BLD AUTO: 1.9 % (ref 0.3–6.2)
ERYTHROCYTE [DISTWIDTH] IN BLOOD BY AUTOMATED COUNT: 13.2 % (ref 12.3–15.4)
HBA1C MFR BLD: 5.3 % (ref 4.8–5.6)
HCT VFR BLD AUTO: 52.8 % (ref 34–46.6)
HGB BLD-MCNC: 17.4 G/DL (ref 12–15.9)
LYMPHOCYTES # BLD AUTO: 1.98 10*3/MM3 (ref 0.7–3.1)
LYMPHOCYTES NFR BLD AUTO: 25.6 % (ref 19.6–45.3)
MCH RBC QN AUTO: 31.6 PG (ref 26.6–33)
MCHC RBC AUTO-ENTMCNC: 33 G/DL (ref 31.5–35.7)
MCV RBC AUTO: 95.8 FL (ref 79–97)
MONOCYTES # BLD AUTO: 0.57 10*3/MM3 (ref 0.1–0.9)
MONOCYTES NFR BLD AUTO: 7.4 % (ref 5–12)
NEUTROPHILS NFR BLD AUTO: 4.95 10*3/MM3 (ref 1.7–7)
NEUTROPHILS NFR BLD AUTO: 64.1 % (ref 42.7–76)
PLATELET # BLD AUTO: 214 10*3/MM3 (ref 140–450)
PMV BLD AUTO: 9.4 FL (ref 6–12)
RBC # BLD AUTO: 5.51 10*6/MM3 (ref 3.77–5.28)
WBC NRBC COR # BLD: 7.73 10*3/MM3 (ref 3.4–10.8)

## 2023-01-04 ENCOUNTER — OFFICE VISIT (OUTPATIENT)
Dept: FAMILY MEDICINE CLINIC | Facility: CLINIC | Age: 73
End: 2023-01-04
Payer: MEDICARE

## 2023-01-04 VITALS
BODY MASS INDEX: 23.81 KG/M2 | TEMPERATURE: 97.1 F | HEART RATE: 101 BPM | SYSTOLIC BLOOD PRESSURE: 104 MMHG | HEIGHT: 61 IN | OXYGEN SATURATION: 95 % | DIASTOLIC BLOOD PRESSURE: 66 MMHG

## 2023-01-04 DIAGNOSIS — R41.3 MEMORY DEFICIT: ICD-10-CM

## 2023-01-04 DIAGNOSIS — Z00.00 MEDICARE ANNUAL WELLNESS VISIT, SUBSEQUENT: Primary | ICD-10-CM

## 2023-01-04 DIAGNOSIS — L60.9 NAIL DISORDER: ICD-10-CM

## 2023-01-04 DIAGNOSIS — Z12.11 SCREENING FOR COLON CANCER: ICD-10-CM

## 2023-01-04 DIAGNOSIS — M79.672 BILATERAL FOOT PAIN: ICD-10-CM

## 2023-01-04 DIAGNOSIS — Z12.31 ENCOUNTER FOR SCREENING MAMMOGRAM FOR MALIGNANT NEOPLASM OF BREAST: ICD-10-CM

## 2023-01-04 DIAGNOSIS — Z71.89 ADVANCE DIRECTIVE DISCUSSED WITH PATIENT: ICD-10-CM

## 2023-01-04 DIAGNOSIS — J44.9 CHRONIC OBSTRUCTIVE PULMONARY DISEASE, UNSPECIFIED COPD TYPE: ICD-10-CM

## 2023-01-04 DIAGNOSIS — Z23 NEED FOR PNEUMOCOCCAL VACCINE: ICD-10-CM

## 2023-01-04 DIAGNOSIS — M79.671 BILATERAL FOOT PAIN: ICD-10-CM

## 2023-01-04 DIAGNOSIS — R41.89 COGNITIVE IMPAIRMENT: ICD-10-CM

## 2023-01-04 PROCEDURE — 1159F MED LIST DOCD IN RCRD: CPT | Performed by: FAMILY MEDICINE

## 2023-01-04 PROCEDURE — 1170F FXNL STATUS ASSESSED: CPT | Performed by: FAMILY MEDICINE

## 2023-01-04 PROCEDURE — 90677 PCV20 VACCINE IM: CPT | Performed by: FAMILY MEDICINE

## 2023-01-04 PROCEDURE — G0009 ADMIN PNEUMOCOCCAL VACCINE: HCPCS | Performed by: FAMILY MEDICINE

## 2023-01-04 PROCEDURE — 99214 OFFICE O/P EST MOD 30 MIN: CPT | Performed by: FAMILY MEDICINE

## 2023-01-04 PROCEDURE — G0402 INITIAL PREVENTIVE EXAM: HCPCS | Performed by: FAMILY MEDICINE

## 2023-01-04 RX ORDER — OMEPRAZOLE 40 MG/1
40 CAPSULE, DELAYED RELEASE ORAL DAILY PRN
Qty: 90 CAPSULE | Refills: 1 | Status: ON HOLD | OUTPATIENT
Start: 2023-01-04 | End: 2023-04-05 | Stop reason: SDUPTHER

## 2023-01-04 RX ORDER — PRAVASTATIN SODIUM 40 MG
40 TABLET ORAL DAILY
Qty: 90 TABLET | Refills: 1 | Status: SHIPPED | OUTPATIENT
Start: 2023-01-04

## 2023-01-04 RX ORDER — ALBUTEROL SULFATE 90 UG/1
2 AEROSOL, METERED RESPIRATORY (INHALATION) EVERY 4 HOURS PRN
Qty: 18 G | Refills: 2 | Status: SHIPPED | OUTPATIENT
Start: 2023-01-04 | End: 2023-02-10 | Stop reason: SDUPTHER

## 2023-01-04 RX ORDER — LISINOPRIL 20 MG/1
20 TABLET ORAL DAILY
Qty: 90 TABLET | Refills: 1 | Status: SHIPPED | OUTPATIENT
Start: 2023-01-04

## 2023-01-04 RX ORDER — CITALOPRAM 20 MG/1
20 TABLET ORAL EVERY MORNING
Qty: 90 TABLET | Refills: 1 | Status: SHIPPED | OUTPATIENT
Start: 2023-01-04 | End: 2023-04-04

## 2023-01-04 NOTE — PROGRESS NOTES
Subsequent Medicare Wellness Visit    Subjective      Bessy Leggett is a 72 y.o. female who presents for a Subsequent Medicare Wellness Visit.    The following portions of the patient's history were reviewed and   updated as appropriate: allergies, current medications, past family history, past medical history, past social history, past surgical history and problem list.    Compared to one year ago, the patient feels her physical   health is worse.  History of COPD- patient appears to have difficulty remembering to use her medications.  Currently prescribed Anoro and a rescue inhaler.  She reports that she has not been able to take her medications secondary to those medications not being refilled.  No recent visits with pulmonology.    Compared to one year ago, the patient feels her mental   health is worse.    Recent Hospitalizations:  She was not admitted to the hospital during the last year.     Current Medical Providers:  Patient Care Team:  Erich Joya DO as PCP - General (Family Medicine)    Outpatient Medications Prior to Visit   Medication Sig Dispense Refill   • albuterol sulfate  (90 Base) MCG/ACT inhaler Inhale 1 puff Every 6 (Six) Hours As Needed for Wheezing or Shortness of Air. 8.5 g 3   • citalopram (CeleXA) 20 MG tablet Take 1 tablet by mouth Every Morning for 90 days. 90 tablet 1   • lisinopril (PRINIVIL,ZESTRIL) 20 MG tablet Take 1 tablet by mouth Daily. 90 tablet 1   • omeprazole (priLOSEC) 40 MG capsule Take 1 capsule by mouth Daily As Needed (GERD). 90 capsule 1   • pravastatin (PRAVACHOL) 40 MG tablet Take 1 tablet by mouth Daily. 90 tablet 1   • umeclidinium-vilanterol (Anoro Ellipta) 62.5-25 MCG/INH aerosol powder  inhaler Inhale 1 puff Daily. 60 each 2     No facility-administered medications prior to visit.       No opioid medication identified on active medication list. I have reviewed chart for other potential  high risk medication/s and harmful drug interactions in the  elderly.          Aspirin is not on active medication list.  Aspirin use is not indicated based on review of current medical condition/s. Risk of harm outweighs potential benefits.      Patient Active Problem List   Diagnosis   • AAA (abdominal aortic aneurysm) without rupture   • Hypertension   • Anxiety   • Asthma   • Cataract   • COPD (chronic obstructive pulmonary disease) (HCC)   • Depression   • Non-insulin dependent type 2 diabetes mellitus (HCC)   • Family history of blood clots   • GERD (gastroesophageal reflux disease)   • History of AAA (abdominal aortic aneurysm) repair   • History of seizure   • Hypercholesterolemia   • IBS (irritable bowel syndrome)   • Low back pain     Advance Care Planning  Advance Directive is not on file.  ACP discussion was held with the patient during this visit. Patient does not have an advance directive, declines further assistance.     Objective    Vitals:    01/04/23 1035   BP: 104/66   BP Location: Left arm   Patient Position: Sitting   Cuff Size: Adult   Pulse: 101   Temp: 97.1 °F (36.2 °C)   TempSrc: Temporal   SpO2: 95%   Height: 154.9 cm (61\")     Estimated body mass index is 23.81 kg/m² as calculated from the following:    Height as of this encounter: 154.9 cm (61\").    Weight as of 10/21/22: 57.2 kg (126 lb).    BMI is within normal parameters. No other follow-up for BMI required.    Does the patient have evidence of cognitive impairment?   Yes: Referral to neurology ordered.  MRI brain ordered.    Lab Results   Component Value Date    TRIG 168 (H) 10/21/2022    HDL 47 10/21/2022    LDL 92 10/21/2022    VLDL 29 10/21/2022    HGBA1C 5.30 10/21/2022       HEALTH RISK ASSESSMENT    Smoking Status:  Social History     Tobacco Use   Smoking Status Every Day   Smokeless Tobacco Never     Alcohol Consumption:  Social History     Substance and Sexual Activity   Alcohol Use Never     Fall Risk Screen:    STEADI Fall Risk Assessment was completed, and patient is at MODERATE  risk for falls. Assessment completed on:1/4/2023    Depression Screening:  PHQ-2/PHQ-9 Depression Screening 1/4/2023   Little Interest or Pleasure in Doing Things 3-->nearly every day   Feeling Down, Depressed or Hopeless 3-->nearly every day   Trouble Falling or Staying Asleep, or Sleeping Too Much 3-->nearly every day   Feeling Tired or Having Little Energy 3-->nearly every day   Poor Appetite or Overeating 0-->not at all   Feeling Bad about Yourself - or that You are a Failure or Have Let Yourself or Your Family Down 1-->several days   Trouble Concentrating on Things, Such as Reading the Newspaper or Watching Television 1-->several days   Moving or Speaking So Slowly that Other People Could Have Noticed? Or the Opposite - Being So Fidgety 2-->more than half the days   Thoughts that You Would be Better Off Dead or of Hurting Yourself in Some Way 0-->not at all   PHQ-9: Brief Depression Severity Measure Score 16   If You Checked Off Any Problems, How Difficult Have These Problems Made It For You to Do Your Work, Take Care of Things at Home, or Get Along with Other People? somewhat difficult     Health Habits and Functional and Cognitive Screening:  Functional & Cognitive Status 1/4/2023   Do you have difficulty preparing food and eating? Yes   Do you have difficulty bathing yourself, getting dressed or grooming yourself? Yes   Do you have difficulty using the toilet? Yes   Do you have difficulty moving around from place to place? Yes   Do you have trouble with steps or getting out of a bed or a chair? Yes   Current Diet Well Balanced Diet   Dental Exam Not up to date        Dental Exam Comment top dentures   Eye Exam Not up to date        Eye Exam Comment 2-3 yrs since she's had eye exam   Exercise (times per week) 0 times per week   Current Exercises Include No Regular Exercise   Do you need help using the phone?  Yes   Are you deaf or do you have serious difficulty hearing?  No   Do you need help with  transportation? Yes   Do you need help shopping? Yes   Do you need help preparing meals?  Yes   Do you need help with housework?  Yes   Do you need help with laundry? Yes   Do you need help taking your medications? Yes   Do you need help managing money? Yes   Do you ever drive or ride in a car without wearing a seat belt? No   Have you felt unusual stress, anger or loneliness in the last month? No   Who do you live with? Child   If you need help, do you have trouble finding someone available to you? No   Have you been bothered in the last four weeks by sexual problems? No   Do you have difficulty concentrating, remembering or making decisions? Yes     Age-appropriate Screening Schedule:  Refer to the list below for future screening recommendations based on patient's age, sex and/or medical conditions. Orders for these recommended tests are listed in the plan section. The patient has been provided with a written plan.    Health Maintenance   Topic Date Due   • DXA SCAN  Never done   • TDAP/TD VACCINES (1 - Tdap) Never done   • ZOSTER VACCINE (1 of 2) Never done   • URINE MICROALBUMIN  12/17/2020   • DIABETIC EYE EXAM  Never done   • DIABETIC FOOT EXAM  01/13/2022   • MAMMOGRAM  01/01/2024 (Originally 10/11/2020)   • HEMOGLOBIN A1C  04/21/2023   • LIPID PANEL  10/21/2023   • INFLUENZA VACCINE  Completed        Diagnoses and all orders for this visit:    1. Medicare annual wellness visit, subsequent (Primary)  Comments:  See note.     2. Advance directive discussed with patient  Comments:  Daughter listed as surrogate decision-maker.  Patient is not interested in paperwork regarding a living will.      3. Screening for colon cancer  Comments:  Most recent colonoscopy completed 2018 with recs to repeat in 5 years.   Orders:  -     Ambulatory Referral to General Surgery    4. Encounter for screening mammogram for malignant neoplasm of breast  Comments:  Patient would like to hold off at this time.    5. Need for  pneumococcal vaccine  Comments:  Administered.     6. Cognitive impairment  Comments:  2/3 recall with significant difficulty and prompting. Daughter in the room reports memory concerns. MRI and referral to neurology as noted.  Orders:  -     MRI Brain Without Contrast; Future  -     Ambulatory Referral to Neurology    7. Memory deficit  Comments:  Same as above.  Orders:  -     MRI Brain Without Contrast; Future  -     Ambulatory Referral to Neurology    8. Bilateral foot pain  Comments:  Referred to podiatry for eval and recs.   Orders:  -     Ambulatory Referral to Podiatry    9. Nail disorder  -     Ambulatory Referral to Podiatry    Other orders  -     citalopram (CeleXA) 20 MG tablet; Take 1 tablet by mouth Every Morning for 90 days.  Dispense: 90 tablet; Refill: 1  -     lisinopril (PRINIVIL,ZESTRIL) 20 MG tablet; Take 1 tablet by mouth Daily.  Dispense: 90 tablet; Refill: 1  -     omeprazole (priLOSEC) 40 MG capsule; Take 1 capsule by mouth Daily As Needed (GERD).  Dispense: 90 capsule; Refill: 1  -     pravastatin (PRAVACHOL) 40 MG tablet; Take 1 tablet by mouth Daily.  Dispense: 90 tablet; Refill: 1  -     Umeclidinium-Vilanterol (Anoro Ellipta) 62.5-25 MCG/ACT aerosol powder  inhaler; Inhale 1 puff Daily.  Dispense: 60 each; Refill: 2  -     albuterol sulfate  (90 Base) MCG/ACT inhaler; Inhale 2 puffs Every 4 (Four) Hours As Needed for Wheezing.  Dispense: 18 g; Refill: 2  -     Pneumococcal Conjugate Vaccine 20-Valent (PCV20)      Follow Up:   Next Medicare Wellness visit to be scheduled in 1 year.      An After Visit Summary and PPPS were made available to the patient.

## 2023-01-04 NOTE — PROGRESS NOTES
Chief Complaint    BILATERAL FOOT PAIN AND OVERGROWN NAILS     Subjective      Bessy Leggett presents to [facility]     History of Present Illness    1.) BILATERAL FOOT PAIN AND OVERGROWN NAILS : Patient presents with complaint of bilateral foot pain.  She also presents with overgrown nails.  Admits to significant difficulty in ambulation.  Difficulty with trimming nails and would like to be evaluated by podiatry.    2.) COPD : Unstable.  Patient admits that she has not been using her inhaled medications as prescribed.  No recent visit with pulmonology.    Objective      Vital Signs:    /66 (BP Location: Left arm, Patient Position: Sitting, Cuff Size: Adult)   Pulse 101   Temp 97.1 °F (36.2 °C) (Temporal)   Ht 154.9 cm (61\")   SpO2 95%   BMI 23.81 kg/m²   Estimated body mass index is 23.81 kg/m² as calculated from the following:    Height as of this encounter: 154.9 cm (61\").    Weight as of 10/21/22: 57.2 kg (126 lb).    BMI is within normal parameters. No other follow-up for BMI required.    Physical Exam  Vitals reviewed.   Constitutional:       General: She is not in acute distress.     Appearance: Normal appearance. She is well-developed.   HENT:      Head: Normocephalic and atraumatic.      Right Ear: Hearing and external ear normal.      Left Ear: Hearing and external ear normal.      Nose: Nose normal.   Eyes:      General: Lids are normal.         Right eye: No discharge.         Left eye: No discharge.      Conjunctiva/sclera: Conjunctivae normal.   Pulmonary:      Effort: Pulmonary effort is normal.   Abdominal:      General: There is no distension.   Musculoskeletal:         General: No swelling.      Cervical back: Neck supple.   Feet:      Right foot:      Skin integrity: Dry skin present.      Toenail Condition: Right toenails are abnormally thick and long.      Left foot:      Skin integrity: Dry skin present.      Toenail Condition: Left toenails are  abnormally thick and long.   Skin:     Coloration: Skin is not jaundiced.      Findings: No erythema.   Neurological:      Mental Status: She is alert. Mental status is at baseline.   Psychiatric:         Mood and Affect: Mood and affect normal.         Thought Content: Thought content normal.        Assessment and Plan    Diagnoses and all orders for this visit:    1. Medicare annual wellness visit, subsequent (Primary)  Comments:  See note.     2. Advance directive discussed with patient  Comments:  Daughter listed as surrogate decision-maker.  Patient is not interested in paperwork regarding a living will.      3. Screening for colon cancer  Comments:  Most recent colonoscopy completed 2018 with recs to repeat in 5 years.   Orders:  -     Ambulatory Referral to General Surgery    4. Encounter for screening mammogram for malignant neoplasm of breast  Comments:  Patient would like to hold off at this time.    5. Need for pneumococcal vaccine  Comments:  Administered.     6. Cognitive impairment  Comments:  2/3 recall with significant difficulty and prompting. Daughter in the room reports memory concerns. MRI and referral to neurology as noted.  Orders:  -     MRI Brain Without Contrast; Future  -     Ambulatory Referral to Neurology    7. Memory deficit  Comments:  Same as above.  Orders:  -     MRI Brain Without Contrast; Future  -     Ambulatory Referral to Neurology    8. Bilateral foot pain  Comments:  Referred to podiatry for eval and recs.   Orders:  -     Ambulatory Referral to Podiatry    9. Nail disorder  -     Ambulatory Referral to Podiatry    10. Chronic obstructive pulmonary disease, unspecified COPD type (HCC)  Comments:  Restart medications as noted.  If no improvement in symptoms, contact office and we will provide additional recommendations.    Other orders  -     citalopram (CeleXA) 20 MG tablet; Take 1 tablet by mouth Every Morning for 90 days.  Dispense: 90 tablet; Refill: 1  -     lisinopril  (PRINIVIL,ZESTRIL) 20 MG tablet; Take 1 tablet by mouth Daily.  Dispense: 90 tablet; Refill: 1  -     omeprazole (priLOSEC) 40 MG capsule; Take 1 capsule by mouth Daily As Needed (GERD).  Dispense: 90 capsule; Refill: 1  -     pravastatin (PRAVACHOL) 40 MG tablet; Take 1 tablet by mouth Daily.  Dispense: 90 tablet; Refill: 1  -     Umeclidinium-Vilanterol (Anoro Ellipta) 62.5-25 MCG/ACT aerosol powder  inhaler; Inhale 1 puff Daily.  Dispense: 60 each; Refill: 2  -     albuterol sulfate  (90 Base) MCG/ACT inhaler; Inhale 2 puffs Every 4 (Four) Hours As Needed for Wheezing.  Dispense: 18 g; Refill: 2  -     Pneumococcal Conjugate Vaccine 20-Valent (PCV20)      Return in about 6 months (around 7/4/2023).     Patient was given instructions and counseling regarding her condition or for health maintenance advice. Please see specific information pulled into the AVS if appropriate.

## 2023-02-10 RX ORDER — ALBUTEROL SULFATE 90 UG/1
2 AEROSOL, METERED RESPIRATORY (INHALATION) EVERY 4 HOURS PRN
Qty: 18 G | Refills: 2 | Status: SHIPPED | OUTPATIENT
Start: 2023-02-10

## 2023-03-13 ENCOUNTER — TELEPHONE (OUTPATIENT)
Dept: FAMILY MEDICINE CLINIC | Facility: CLINIC | Age: 73
End: 2023-03-13

## 2023-03-13 RX ORDER — DIAZEPAM 5 MG/1
5 TABLET ORAL 2 TIMES DAILY PRN
Qty: 4 TABLET | Refills: 0 | Status: SHIPPED | OUTPATIENT
Start: 2023-03-13 | End: 2023-04-04

## 2023-03-13 NOTE — TELEPHONE ENCOUNTER
Caller: SKYLAR PHELPS    Relationship: Emergency Contact    Best call back number: [phone]       Yes  [] No    If a prescription is needed, what is your preferred pharmacy and phone number:        [id number] - [place], [place] - [address] AT [facility] - [phone]  - [phone] FX  [phone]    Additional notes:     THE PATIENT DAUGHTER SAID THE PATIENT HAS AN APPOINTMENT  TOMORROW FOR MRI. SHE IS WANTING TO KNOW IF PCP  WOULD  PRESCRIBE SOME MEDICATION TO HELP HER THROUGH IT.       THE PATIENT WOULD LIKE A CALL ONCE THIS HAS BEEN SENT TO  THE PHARMACY

## 2023-03-14 ENCOUNTER — HOSPITAL ENCOUNTER (OUTPATIENT)
Dept: MRI IMAGING | Facility: HOSPITAL | Age: 73
Discharge: HOME | End: 2023-03-14
Admitting: FAMILY MEDICINE
Payer: MEDICARE

## 2023-03-14 DIAGNOSIS — R41.3 MEMORY DEFICIT: ICD-10-CM

## 2023-03-14 DIAGNOSIS — R41.89 COGNITIVE IMPAIRMENT: ICD-10-CM

## 2023-03-14 PROCEDURE — 70551 MRI BRAIN STEM W/O DYE: CPT

## 2023-04-04 ENCOUNTER — HOSPITAL ENCOUNTER (OUTPATIENT)
Facility: HOSPITAL | Age: 73
Discharge: HOME | End: 2023-04-05
Attending: EMERGENCY MEDICINE | Admitting: INTERNAL MEDICINE
Payer: MEDICARE

## 2023-04-04 ENCOUNTER — APPOINTMENT (OUTPATIENT)
Dept: CT IMAGING | Facility: HOSPITAL | Age: 73
End: 2023-04-04
Payer: MEDICARE

## 2023-04-04 DIAGNOSIS — Z78.9 DECREASED ACTIVITIES OF DAILY LIVING (ADL): ICD-10-CM

## 2023-04-04 DIAGNOSIS — N39.0 ACUTE URINARY TRACT INFECTION: ICD-10-CM

## 2023-04-04 DIAGNOSIS — R10.13 EPIGASTRIC PAIN: Primary | ICD-10-CM

## 2023-04-04 PROBLEM — K92.1 MELENA: Status: ACTIVE | Noted: 2023-04-04

## 2023-04-04 LAB
ALBUMIN SERPL-MCNC: 4.1 G/DL (ref 3.5–5.2)
ALBUMIN/GLOB SERPL: 1.6 G/DL
ALP SERPL-CCNC: 68 U/L (ref 39–117)
ALT SERPL W P-5'-P-CCNC: 5 U/L (ref 1–33)
ANION GAP SERPL CALCULATED.3IONS-SCNC: 11.7 MMOL/L (ref 5–15)
AST SERPL-CCNC: 12 U/L (ref 1–32)
BACTERIA UR QL AUTO: ABNORMAL /HPF
BASOPHILS # BLD AUTO: 0.04 10*3/MM3 (ref 0–0.2)
BASOPHILS NFR BLD AUTO: 0.5 % (ref 0–1.5)
BILIRUB SERPL-MCNC: 0.6 MG/DL (ref 0–1.2)
BILIRUB UR QL STRIP: NEGATIVE
BUN SERPL-MCNC: 15 MG/DL (ref 8–23)
BUN/CREAT SERPL: 17 (ref 7–25)
CALCIUM SPEC-SCNC: 9.8 MG/DL (ref 8.6–10.5)
CHLORIDE SERPL-SCNC: 100 MMOL/L (ref 98–107)
CLARITY UR: ABNORMAL
CO2 SERPL-SCNC: 28.3 MMOL/L (ref 22–29)
COLOR UR: YELLOW
CREAT SERPL-MCNC: 0.88 MG/DL (ref 0.57–1)
D-LACTATE SERPL-SCNC: 1.1 MMOL/L (ref 0.5–2)
DEPRECATED RDW RBC AUTO: 43.3 FL (ref 37–54)
EGFRCR SERPLBLD CKD-EPI 2021: 69.9 ML/MIN/1.73
EOSINOPHIL # BLD AUTO: 0.14 10*3/MM3 (ref 0–0.4)
EOSINOPHIL NFR BLD AUTO: 1.9 % (ref 0.3–6.2)
ERYTHROCYTE [DISTWIDTH] IN BLOOD BY AUTOMATED COUNT: 12.6 % (ref 12.3–15.4)
GLOBULIN UR ELPH-MCNC: 2.5 GM/DL
GLUCOSE BLDC GLUCOMTR-MCNC: 95 MG/DL (ref 70–99)
GLUCOSE SERPL-MCNC: 189 MG/DL (ref 65–99)
GLUCOSE UR STRIP-MCNC: NEGATIVE MG/DL
HCT VFR BLD AUTO: 46.8 % (ref 34–46.6)
HEMOCCULT STL QL IA: NEGATIVE
HGB BLD-MCNC: 16.1 G/DL (ref 12–15.9)
HGB UR QL STRIP.AUTO: NEGATIVE
HOLD SPECIMEN: NORMAL
HOLD SPECIMEN: NORMAL
HYALINE CASTS UR QL AUTO: ABNORMAL /LPF
IMM GRANULOCYTES # BLD AUTO: 0.02 10*3/MM3 (ref 0–0.05)
IMM GRANULOCYTES NFR BLD AUTO: 0.3 % (ref 0–0.5)
KETONES UR QL STRIP: NEGATIVE
LEUKOCYTE ESTERASE UR QL STRIP.AUTO: ABNORMAL
LIPASE SERPL-CCNC: 30 U/L (ref 13–60)
LYMPHOCYTES # BLD AUTO: 1.84 10*3/MM3 (ref 0.7–3.1)
LYMPHOCYTES NFR BLD AUTO: 24.7 % (ref 19.6–45.3)
MCH RBC QN AUTO: 32 PG (ref 26.6–33)
MCHC RBC AUTO-ENTMCNC: 34.4 G/DL (ref 31.5–35.7)
MCV RBC AUTO: 93 FL (ref 79–97)
MONOCYTES # BLD AUTO: 0.71 10*3/MM3 (ref 0.1–0.9)
MONOCYTES NFR BLD AUTO: 9.5 % (ref 5–12)
NEUTROPHILS NFR BLD AUTO: 4.71 10*3/MM3 (ref 1.7–7)
NEUTROPHILS NFR BLD AUTO: 63.1 % (ref 42.7–76)
NITRITE UR QL STRIP: NEGATIVE
NRBC BLD AUTO-RTO: 0 /100 WBC (ref 0–0.2)
PH UR STRIP.AUTO: 7 [PH] (ref 5–8)
PLATELET # BLD AUTO: 188 10*3/MM3 (ref 140–450)
PMV BLD AUTO: 9.5 FL (ref 6–12)
POTASSIUM SERPL-SCNC: 3.3 MMOL/L (ref 3.5–5.2)
PROT SERPL-MCNC: 6.6 G/DL (ref 6–8.5)
PROT UR QL STRIP: NEGATIVE
RBC # BLD AUTO: 5.03 10*6/MM3 (ref 3.77–5.28)
RBC # UR STRIP: ABNORMAL /HPF
REF LAB TEST METHOD: ABNORMAL
SODIUM SERPL-SCNC: 140 MMOL/L (ref 136–145)
SP GR UR STRIP: 1.02 (ref 1–1.03)
SQUAMOUS #/AREA URNS HPF: ABNORMAL /HPF
UROBILINOGEN UR QL STRIP: ABNORMAL
WBC # UR STRIP: ABNORMAL /HPF
WBC NRBC COR # BLD: 7.46 10*3/MM3 (ref 3.4–10.8)
WHOLE BLOOD HOLD COAG: NORMAL
WHOLE BLOOD HOLD SPECIMEN: NORMAL

## 2023-04-04 PROCEDURE — 36415 COLL VENOUS BLD VENIPUNCTURE: CPT

## 2023-04-04 PROCEDURE — 87086 URINE CULTURE/COLONY COUNT: CPT | Performed by: EMERGENCY MEDICINE

## 2023-04-04 PROCEDURE — 96367 TX/PROPH/DG ADDL SEQ IV INF: CPT

## 2023-04-04 PROCEDURE — 94799 UNLISTED PULMONARY SVC/PX: CPT

## 2023-04-04 PROCEDURE — G0378 HOSPITAL OBSERVATION PER HR: HCPCS

## 2023-04-04 PROCEDURE — 25510000001 IOPAMIDOL PER 1 ML: Performed by: EMERGENCY MEDICINE

## 2023-04-04 PROCEDURE — 25010000002 CEFTRIAXONE PER 250 MG: Performed by: EMERGENCY MEDICINE

## 2023-04-04 PROCEDURE — 85025 COMPLETE CBC W/AUTO DIFF WBC: CPT

## 2023-04-04 PROCEDURE — 0 POTASSIUM CHLORIDE 10 MEQ/100ML SOLUTION: Performed by: STUDENT IN AN ORGANIZED HEALTH CARE EDUCATION/TRAINING PROGRAM

## 2023-04-04 PROCEDURE — 80053 COMPREHEN METABOLIC PANEL: CPT

## 2023-04-04 PROCEDURE — 87040 BLOOD CULTURE FOR BACTERIA: CPT | Performed by: EMERGENCY MEDICINE

## 2023-04-04 PROCEDURE — 99285 EMERGENCY DEPT VISIT HI MDM: CPT

## 2023-04-04 PROCEDURE — 96375 TX/PRO/DX INJ NEW DRUG ADDON: CPT

## 2023-04-04 PROCEDURE — 82274 ASSAY TEST FOR BLOOD FECAL: CPT | Performed by: EMERGENCY MEDICINE

## 2023-04-04 PROCEDURE — 94640 AIRWAY INHALATION TREATMENT: CPT

## 2023-04-04 PROCEDURE — 81001 URINALYSIS AUTO W/SCOPE: CPT | Performed by: EMERGENCY MEDICINE

## 2023-04-04 PROCEDURE — 99204 OFFICE O/P NEW MOD 45 MIN: CPT | Performed by: STUDENT IN AN ORGANIZED HEALTH CARE EDUCATION/TRAINING PROGRAM

## 2023-04-04 PROCEDURE — 74177 CT ABD & PELVIS W/CONTRAST: CPT

## 2023-04-04 PROCEDURE — 83690 ASSAY OF LIPASE: CPT

## 2023-04-04 PROCEDURE — 87088 URINE BACTERIA CULTURE: CPT | Performed by: EMERGENCY MEDICINE

## 2023-04-04 PROCEDURE — 96365 THER/PROPH/DIAG IV INF INIT: CPT

## 2023-04-04 PROCEDURE — 82962 GLUCOSE BLOOD TEST: CPT

## 2023-04-04 PROCEDURE — 87186 SC STD MICRODIL/AGAR DIL: CPT | Performed by: EMERGENCY MEDICINE

## 2023-04-04 PROCEDURE — 83605 ASSAY OF LACTIC ACID: CPT

## 2023-04-04 RX ORDER — SODIUM CHLORIDE, SODIUM LACTATE, POTASSIUM CHLORIDE, CALCIUM CHLORIDE 600; 310; 30; 20 MG/100ML; MG/100ML; MG/100ML; MG/100ML
75 INJECTION, SOLUTION INTRAVENOUS CONTINUOUS
Status: ACTIVE | OUTPATIENT
Start: 2023-04-04 | End: 2023-04-05

## 2023-04-04 RX ORDER — SODIUM CHLORIDE 9 MG/ML
40 INJECTION, SOLUTION INTRAVENOUS AS NEEDED
Status: DISCONTINUED | OUTPATIENT
Start: 2023-04-04 | End: 2023-04-05 | Stop reason: HOSPADM

## 2023-04-04 RX ORDER — POTASSIUM CHLORIDE 7.45 MG/ML
10 INJECTION INTRAVENOUS
Status: COMPLETED | OUTPATIENT
Start: 2023-04-04 | End: 2023-04-05

## 2023-04-04 RX ORDER — NICOTINE POLACRILEX 4 MG
15 LOZENGE BUCCAL
Status: DISCONTINUED | OUTPATIENT
Start: 2023-04-04 | End: 2023-04-05 | Stop reason: HOSPADM

## 2023-04-04 RX ORDER — LEVALBUTEROL INHALATION SOLUTION 1.25 MG/3ML
1.25 SOLUTION RESPIRATORY (INHALATION) EVERY 6 HOURS PRN
Status: DISCONTINUED | OUTPATIENT
Start: 2023-04-04 | End: 2023-04-05 | Stop reason: HOSPADM

## 2023-04-04 RX ORDER — DEXTROSE MONOHYDRATE 25 G/50ML
25 INJECTION, SOLUTION INTRAVENOUS
Status: DISCONTINUED | OUTPATIENT
Start: 2023-04-04 | End: 2023-04-05 | Stop reason: HOSPADM

## 2023-04-04 RX ORDER — PRAVASTATIN SODIUM 40 MG
40 TABLET ORAL NIGHTLY
Status: DISCONTINUED | OUTPATIENT
Start: 2023-04-04 | End: 2023-04-05 | Stop reason: HOSPADM

## 2023-04-04 RX ORDER — CITALOPRAM 20 MG/1
20 TABLET ORAL EVERY MORNING
Status: DISCONTINUED | OUTPATIENT
Start: 2023-04-05 | End: 2023-04-05 | Stop reason: HOSPADM

## 2023-04-04 RX ORDER — BISACODYL 10 MG
10 SUPPOSITORY, RECTAL RECTAL DAILY PRN
Status: DISCONTINUED | OUTPATIENT
Start: 2023-04-04 | End: 2023-04-05 | Stop reason: HOSPADM

## 2023-04-04 RX ORDER — SODIUM CHLORIDE 0.9 % (FLUSH) 0.9 %
10 SYRINGE (ML) INJECTION EVERY 12 HOURS SCHEDULED
Status: DISCONTINUED | OUTPATIENT
Start: 2023-04-04 | End: 2023-04-05 | Stop reason: HOSPADM

## 2023-04-04 RX ORDER — BISACODYL 5 MG/1
5 TABLET, DELAYED RELEASE ORAL DAILY PRN
Status: DISCONTINUED | OUTPATIENT
Start: 2023-04-04 | End: 2023-04-05 | Stop reason: HOSPADM

## 2023-04-04 RX ORDER — CHOLECALCIFEROL (VITAMIN D3) 125 MCG
5 CAPSULE ORAL NIGHTLY PRN
Status: DISCONTINUED | OUTPATIENT
Start: 2023-04-04 | End: 2023-04-05 | Stop reason: HOSPADM

## 2023-04-04 RX ORDER — CEFTRIAXONE SODIUM 1 G/50ML
1 INJECTION, SOLUTION INTRAVENOUS EVERY 24 HOURS
Status: DISCONTINUED | OUTPATIENT
Start: 2023-04-05 | End: 2023-04-05

## 2023-04-04 RX ORDER — PANTOPRAZOLE SODIUM 40 MG/10ML
40 INJECTION, POWDER, LYOPHILIZED, FOR SOLUTION INTRAVENOUS
Status: DISCONTINUED | OUTPATIENT
Start: 2023-04-04 | End: 2023-04-05 | Stop reason: HOSPADM

## 2023-04-04 RX ORDER — LISINOPRIL 20 MG/1
20 TABLET ORAL DAILY
Status: DISCONTINUED | OUTPATIENT
Start: 2023-04-05 | End: 2023-04-05 | Stop reason: HOSPADM

## 2023-04-04 RX ORDER — ARFORMOTEROL TARTRATE 15 UG/2ML
15 SOLUTION RESPIRATORY (INHALATION)
Status: DISCONTINUED | OUTPATIENT
Start: 2023-04-04 | End: 2023-04-05 | Stop reason: HOSPADM

## 2023-04-04 RX ORDER — CEFTRIAXONE SODIUM 1 G/50ML
1 INJECTION, SOLUTION INTRAVENOUS ONCE
Status: COMPLETED | OUTPATIENT
Start: 2023-04-04 | End: 2023-04-04

## 2023-04-04 RX ORDER — HYDRALAZINE HYDROCHLORIDE 25 MG/1
25 TABLET, FILM COATED ORAL ONCE
Status: COMPLETED | OUTPATIENT
Start: 2023-04-05 | End: 2023-04-04

## 2023-04-04 RX ORDER — AMOXICILLIN 250 MG
2 CAPSULE ORAL 2 TIMES DAILY
Status: DISCONTINUED | OUTPATIENT
Start: 2023-04-04 | End: 2023-04-05 | Stop reason: HOSPADM

## 2023-04-04 RX ORDER — SODIUM CHLORIDE 0.9 % (FLUSH) 0.9 %
10 SYRINGE (ML) INJECTION AS NEEDED
Status: DISCONTINUED | OUTPATIENT
Start: 2023-04-04 | End: 2023-04-05 | Stop reason: HOSPADM

## 2023-04-04 RX ORDER — NITROGLYCERIN 0.4 MG/1
0.4 TABLET SUBLINGUAL
Status: DISCONTINUED | OUTPATIENT
Start: 2023-04-04 | End: 2023-04-05 | Stop reason: HOSPADM

## 2023-04-04 RX ORDER — POLYETHYLENE GLYCOL 3350 17 G/17G
17 POWDER, FOR SOLUTION ORAL DAILY PRN
Status: DISCONTINUED | OUTPATIENT
Start: 2023-04-04 | End: 2023-04-05 | Stop reason: HOSPADM

## 2023-04-04 RX ADMIN — PRAVASTATIN SODIUM 40 MG: 40 TABLET ORAL at 23:05

## 2023-04-04 RX ADMIN — CEFTRIAXONE SODIUM 1 G: 1 INJECTION, SOLUTION INTRAVENOUS at 19:31

## 2023-04-04 RX ADMIN — ARFORMOTEROL TARTRATE 15 MCG: 15 SOLUTION RESPIRATORY (INHALATION) at 23:48

## 2023-04-04 RX ADMIN — Medication 10 ML: at 23:06

## 2023-04-04 RX ADMIN — SODIUM CHLORIDE, POTASSIUM CHLORIDE, SODIUM LACTATE AND CALCIUM CHLORIDE 75 ML/HR: 600; 310; 30; 20 INJECTION, SOLUTION INTRAVENOUS at 23:05

## 2023-04-04 RX ADMIN — Medication 5 MG: at 23:43

## 2023-04-04 RX ADMIN — POTASSIUM CHLORIDE 10 MEQ: 7.46 INJECTION, SOLUTION INTRAVENOUS at 23:05

## 2023-04-04 RX ADMIN — HYDRALAZINE HYDROCHLORIDE 25 MG: 25 TABLET, FILM COATED ORAL at 23:43

## 2023-04-04 RX ADMIN — SODIUM CHLORIDE 1000 ML: 9 INJECTION, SOLUTION INTRAVENOUS at 17:29

## 2023-04-04 RX ADMIN — PANTOPRAZOLE SODIUM 40 MG: 40 INJECTION, POWDER, FOR SOLUTION INTRAVENOUS at 23:05

## 2023-04-04 RX ADMIN — Medication 10 ML: at 17:30

## 2023-04-04 RX ADMIN — IOPAMIDOL 100 ML: 755 INJECTION, SOLUTION INTRAVENOUS at 19:02

## 2023-04-04 NOTE — ED PROVIDER NOTES
Time: 5:43 PM EDT  Date of encounter:  4/4/2023  Independent Historian/Clinical History and Information was obtained by:   Patient and Family  Chief Complaint: Dark stool    History is limited by: Dementia    History of Present Illness:  Patient is a 73 y.o. year old female who presents to the emergency department for evaluation of dark stools occurring over the last few days, generalized weakness, and dehydration. Pt reports nausea. Pt's daughter reports pt has had urine decreased, stomach pain, chest pain.    HPI    Patient Care Team  Primary Care Provider: Erich Joya DO    Past Medical History:     Allergies   Allergen Reactions   • Penicillins Itching, Rash, Shortness Of Breath and Swelling   • Buspirone Unknown - High Severity   • Ibuprofen Irritability   • Rofecoxib Unknown - Low Severity   • Sumatriptan Unknown - High Severity   • Tramadol Unknown - High Severity   • Tylenol With Codeine #3 [Acetaminophen-Codeine] Unknown - High Severity     Past Medical History:   Diagnosis Date   • Anxiety 05/23/2018    PATIENT REPORTS A LONG HISTORY OF ANXIETY ALONG IWTH AGOURA PHOBIA. SHE HAS BEEN PREVIOUSLY BEEN SEEN BY PSYCH RECENTLY., HER XANAX WAS STOPPED. I WILL REFER THE PATIENT FOR EVALUATION BY MENTAL HEALTH SPECIALIST   • Asthma    • Cataract    • COPD (chronic obstructive pulmonary disease)    • Depression    • GERD (gastroesophageal reflux disease)    • Hypertension    • IBS (irritable bowel syndrome)    • Low back pain      Past Surgical History:   Procedure Laterality Date   • ABDOMINAL SURGERY     • COLONOSCOPY     • GALLBLADDER SURGERY     • HYSTERECTOMY     • VASCULAR SURGERY       Family History   Problem Relation Age of Onset   • Stroke Other    • Heart disease Other    • Hypertension Other    • Cancer Other    • Diabetes Other        Home Medications:  Prior to Admission medications    Medication Sig Start Date End Date Taking? Authorizing Provider   albuterol sulfate  (90 Base) MCG/ACT  "inhaler Inhale 2 puffs Every 4 (Four) Hours As Needed for Wheezing. 2/10/23   Erich Joya DO   citalopram (CeleXA) 20 MG tablet Take 1 tablet by mouth Every Morning for 90 days. 1/4/23 4/4/23  Erich Joya DO   diazePAM (Valium) 5 MG tablet Take 1 tablet by mouth 2 (Two) Times a Day As Needed for Anxiety. 3/13/23   Erich Joya DO   lisinopril (PRINIVIL,ZESTRIL) 20 MG tablet Take 1 tablet by mouth Daily. 1/4/23   Erich Joya DO   omeprazole (priLOSEC) 40 MG capsule Take 1 capsule by mouth Daily As Needed (GERD). 1/4/23   Erich Joya DO   pravastatin (PRAVACHOL) 40 MG tablet Take 1 tablet by mouth Daily. 1/4/23   Erich Joya DO   Umeclidinium-Vilanterol (Anoro Ellipta) 62.5-25 MCG/ACT aerosol powder  inhaler Inhale 1 puff Daily. 1/4/23   Erich Joya DO        Social History:   Social History     Tobacco Use   • Smoking status: Every Day   • Smokeless tobacco: Never   Vaping Use   • Vaping Use: Unknown   Substance Use Topics   • Alcohol use: Never   • Drug use: Never         Review of Systems:  Review of Systems   Constitutional: Negative for chills and fever.        Generalized weakness, dehydration   HENT: Negative for sore throat.    Eyes: Negative for photophobia.   Respiratory: Negative for shortness of breath.    Cardiovascular: Positive for chest pain.   Gastrointestinal: Positive for abdominal pain and nausea. Negative for diarrhea and vomiting.        Dark stool   Genitourinary: Positive for decreased urine volume. Negative for dysuria.   Musculoskeletal: Negative for neck pain.   Skin: Negative for wound.   Neurological: Negative for headaches.   All other systems reviewed and are negative.       Physical Exam:  /76 (BP Location: Left arm, Patient Position: Lying)   Pulse 83   Temp 98.3 °F (36.8 °C) (Oral)   Resp 16   Ht 154.9 cm (61\")   Wt 49.6 kg (109 lb 5.6 oz)   SpO2 94%   BMI 20.66 kg/m²     Physical Exam  Vitals and nursing note reviewed.   Constitutional:       General: " She is not in acute distress.  HENT:      Head: Normocephalic and atraumatic.   Eyes:      Extraocular Movements: Extraocular movements intact.   Cardiovascular:      Rate and Rhythm: Normal rate and regular rhythm.   Pulmonary:      Effort: Pulmonary effort is normal. No respiratory distress.      Breath sounds: Normal breath sounds.   Abdominal:      General: Abdomen is flat. Bowel sounds are decreased.      Palpations: Abdomen is soft.      Tenderness: There is generalized abdominal tenderness.   Musculoskeletal:         General: Normal range of motion.      Cervical back: Normal range of motion and neck supple.   Skin:     General: Skin is warm and dry.      Capillary Refill: Capillary refill takes less than 2 seconds.   Neurological:      Mental Status: She is alert and oriented to person, place, and time. Mental status is at baseline.                  Procedures:  Procedures      Medical Decision Making:      Comorbidities that affect care:    GERD, IBS    External Notes reviewed:    MR KYLE GONZALEZ WO CONTRAST, Office Visit      The following orders were placed and all results were independently analyzed by me:  Orders Placed This Encounter   Procedures   • Blood Culture - Blood,   • Blood Culture - Blood,   • Urine Culture - Urine,   • CT Abdomen Pelvis With Contrast   • [place] Draw   • Comprehensive Metabolic Panel   • Lipase   • Urinalysis With Microscopic If Indicated (No Culture) - Urine, Clean Catch   • Lactic Acid, Plasma   • CBC Auto Differential   • Occult Blood, Fecal By Immunoassay - Stool, Per Rectum   • Urinalysis, Microscopic Only - Urine, Clean Catch   • CBC Auto Differential   • Comprehensive Metabolic Panel   • Magnesium   • Phosphorus   • Undress & Gown   • Place Sequential Compression Device   • Follow Anesthesia Guidelines / Protocol   • Obtain Informed Consent   • Inpatient Consult to Advance Care Planning   • Inpatient Gastroenterology Consult   • Inpatient Case Management   Consult   • OT Plan of Care Cert / Re-Cert   • POC Glucose Once   • POC Glucose Once   • POC Glucose Once   • SCANNED - TELEMETRY     • SCANNED - TELEMETRY     • Initiate Observation Status   • Discharge patient   • UPPER GI ENDOSCOPY   • CBC & Differential   • Green Top (Gel)   • Lavender Top   • Gold Top - SST   • Light Blue Top       Medications Given in the Emergency Department:  Medications   lactated ringers infusion (75 mL/hr Intravenous New Bag 4/4/23 2305)   sodium chloride 0.9 % bolus 1,000 mL (0 mL Intravenous Stopped 4/4/23 1759)   iopamidol (ISOVUE-370) 76 % injection 100 mL (100 mL Intravenous Given 4/4/23 1902)   cefTRIAXone (ROCEPHIN) IVPB 1 g (0 g Intravenous Stopped 4/4/23 2001)   potassium chloride 10 mEq in 100 mL IVPB (10 mEq Intravenous New Bag 4/5/23 0027)   hydrALAZINE (APRESOLINE) tablet 25 mg (25 mg Oral Given 4/4/23 2343)        ED Course:         Labs:    Lab Results (last 24 hours)     ** No results found for the last 24 hours. **           Imaging:    No Radiology Exams Resulted Within Past 24 Hours      Differential Diagnosis and Discussion:    Dark stools    All labs were reviewed and interpreted by me.    MDM         Patient Care Considerations:          Consultants/Shared Management Plan:    Hospitalist: I have discussed the case with Dr. Sampson who agrees to accept the patient for admission.    Social Determinants of Health:    Patient has presented with family members who are responsible, reliable and will ensure follow up care.      Disposition and Care Coordination:    Admit:   Through independent evaluation of the patient's history, physical, and imperical data, the patient meets criteria for observation/admission to the hospital.      Final diagnoses:   Acute urinary tract infection        ED Disposition     ED Disposition   Decision to Admit    Condition   --    Comment   Level of Care: Remote Telemetry [26]   Diagnosis: Melena [[id number]]               This medical record  created using voice recognition software.        Documentation assistance provided by Franki Almaraz acting as scribe for No att. providers found. Information recorded by the scribe was done at my direction and has been verified and validated by me.          Franki Almaraz  04/04/23 1758       Franki Almaraz  04/04/23 1758       Caleb Burrows DO  04/08/23 1434

## 2023-04-05 ENCOUNTER — ANESTHESIA EVENT (OUTPATIENT)
Dept: GASTROENTEROLOGY | Facility: HOSPITAL | Age: 73
End: 2023-04-05
Payer: MEDICARE

## 2023-04-05 ENCOUNTER — READMISSION MANAGEMENT (OUTPATIENT)
Dept: CALL CENTER | Facility: HOSPITAL | Age: 73
End: 2023-04-05
Payer: MEDICARE

## 2023-04-05 ENCOUNTER — ANESTHESIA (OUTPATIENT)
Dept: GASTROENTEROLOGY | Facility: HOSPITAL | Age: 73
End: 2023-04-05
Payer: MEDICARE

## 2023-04-05 VITALS
OXYGEN SATURATION: 94 % | BODY MASS INDEX: 20.65 KG/M2 | WEIGHT: 109.35 LBS | SYSTOLIC BLOOD PRESSURE: 136 MMHG | HEIGHT: 61 IN | TEMPERATURE: 98.3 F | DIASTOLIC BLOOD PRESSURE: 76 MMHG | RESPIRATION RATE: 16 BRPM | HEART RATE: 83 BPM

## 2023-04-05 PROBLEM — R10.13 EPIGASTRIC PAIN: Status: ACTIVE | Noted: 2023-04-04

## 2023-04-05 LAB
ALBUMIN SERPL-MCNC: 3.4 G/DL (ref 3.5–5.2)
ALBUMIN/GLOB SERPL: 1.7 G/DL
ALP SERPL-CCNC: 51 U/L (ref 39–117)
ALT SERPL W P-5'-P-CCNC: <5 U/L (ref 1–33)
ANION GAP SERPL CALCULATED.3IONS-SCNC: 6.4 MMOL/L (ref 5–15)
AST SERPL-CCNC: 11 U/L (ref 1–32)
BASOPHILS # BLD AUTO: 0.01 10*3/MM3 (ref 0–0.2)
BASOPHILS NFR BLD AUTO: 0.2 % (ref 0–1.5)
BILIRUB SERPL-MCNC: 0.4 MG/DL (ref 0–1.2)
BUN SERPL-MCNC: 9 MG/DL (ref 8–23)
BUN/CREAT SERPL: 13.4 (ref 7–25)
CALCIUM SPEC-SCNC: 8.7 MG/DL (ref 8.6–10.5)
CHLORIDE SERPL-SCNC: 103 MMOL/L (ref 98–107)
CO2 SERPL-SCNC: 26.6 MMOL/L (ref 22–29)
CREAT SERPL-MCNC: 0.67 MG/DL (ref 0.57–1)
DEPRECATED RDW RBC AUTO: 43.3 FL (ref 37–54)
EGFRCR SERPLBLD CKD-EPI 2021: 93 ML/MIN/1.73
EOSINOPHIL # BLD AUTO: 0.05 10*3/MM3 (ref 0–0.4)
EOSINOPHIL NFR BLD AUTO: 1.2 % (ref 0.3–6.2)
ERYTHROCYTE [DISTWIDTH] IN BLOOD BY AUTOMATED COUNT: 12.6 % (ref 12.3–15.4)
GLOBULIN UR ELPH-MCNC: 2 GM/DL
GLUCOSE BLDC GLUCOMTR-MCNC: 84 MG/DL (ref 70–99)
GLUCOSE SERPL-MCNC: 87 MG/DL (ref 65–99)
HCT VFR BLD AUTO: 39.6 % (ref 34–46.6)
HGB BLD-MCNC: 13.6 G/DL (ref 12–15.9)
IMM GRANULOCYTES # BLD AUTO: 0.01 10*3/MM3 (ref 0–0.05)
IMM GRANULOCYTES NFR BLD AUTO: 0.2 % (ref 0–0.5)
LYMPHOCYTES # BLD AUTO: 0.58 10*3/MM3 (ref 0.7–3.1)
LYMPHOCYTES NFR BLD AUTO: 13.4 % (ref 19.6–45.3)
MAGNESIUM SERPL-MCNC: 1.6 MG/DL (ref 1.6–2.4)
MCH RBC QN AUTO: 32 PG (ref 26.6–33)
MCHC RBC AUTO-ENTMCNC: 34.3 G/DL (ref 31.5–35.7)
MCV RBC AUTO: 93.2 FL (ref 79–97)
MONOCYTES # BLD AUTO: 0.51 10*3/MM3 (ref 0.1–0.9)
MONOCYTES NFR BLD AUTO: 11.8 % (ref 5–12)
NEUTROPHILS NFR BLD AUTO: 3.18 10*3/MM3 (ref 1.7–7)
NEUTROPHILS NFR BLD AUTO: 73.2 % (ref 42.7–76)
NRBC BLD AUTO-RTO: 0 /100 WBC (ref 0–0.2)
PHOSPHATE SERPL-MCNC: 3 MG/DL (ref 2.5–4.5)
PLATELET # BLD AUTO: 152 10*3/MM3 (ref 140–450)
PMV BLD AUTO: 9.1 FL (ref 6–12)
POTASSIUM SERPL-SCNC: 3.6 MMOL/L (ref 3.5–5.2)
PROT SERPL-MCNC: 5.4 G/DL (ref 6–8.5)
RBC # BLD AUTO: 4.25 10*6/MM3 (ref 3.77–5.28)
SODIUM SERPL-SCNC: 136 MMOL/L (ref 136–145)
WBC NRBC COR # BLD: 4.34 10*3/MM3 (ref 3.4–10.8)

## 2023-04-05 PROCEDURE — 85025 COMPLETE CBC W/AUTO DIFF WBC: CPT | Performed by: STUDENT IN AN ORGANIZED HEALTH CARE EDUCATION/TRAINING PROGRAM

## 2023-04-05 PROCEDURE — 88305 TISSUE EXAM BY PATHOLOGIST: CPT | Performed by: INTERNAL MEDICINE

## 2023-04-05 PROCEDURE — 94799 UNLISTED PULMONARY SVC/PX: CPT

## 2023-04-05 PROCEDURE — 84100 ASSAY OF PHOSPHORUS: CPT | Performed by: STUDENT IN AN ORGANIZED HEALTH CARE EDUCATION/TRAINING PROGRAM

## 2023-04-05 PROCEDURE — 96376 TX/PRO/DX INJ SAME DRUG ADON: CPT

## 2023-04-05 PROCEDURE — 88342 IMHCHEM/IMCYTCHM 1ST ANTB: CPT | Performed by: INTERNAL MEDICINE

## 2023-04-05 PROCEDURE — 99222 1ST HOSP IP/OBS MODERATE 55: CPT | Performed by: INTERNAL MEDICINE

## 2023-04-05 PROCEDURE — G0378 HOSPITAL OBSERVATION PER HR: HCPCS

## 2023-04-05 PROCEDURE — 63710000001 REVEFENACIN 175 MCG/3ML SOLUTION: Performed by: STUDENT IN AN ORGANIZED HEALTH CARE EDUCATION/TRAINING PROGRAM

## 2023-04-05 PROCEDURE — 99214 OFFICE O/P EST MOD 30 MIN: CPT | Performed by: INTERNAL MEDICINE

## 2023-04-05 PROCEDURE — 0 POTASSIUM CHLORIDE 10 MEQ/100ML SOLUTION: Performed by: STUDENT IN AN ORGANIZED HEALTH CARE EDUCATION/TRAINING PROGRAM

## 2023-04-05 PROCEDURE — 82962 GLUCOSE BLOOD TEST: CPT

## 2023-04-05 PROCEDURE — 43239 EGD BIOPSY SINGLE/MULTIPLE: CPT | Performed by: INTERNAL MEDICINE

## 2023-04-05 PROCEDURE — 25010000002 PROPOFOL 10 MG/ML EMULSION

## 2023-04-05 PROCEDURE — 97166 OT EVAL MOD COMPLEX 45 MIN: CPT

## 2023-04-05 PROCEDURE — 83735 ASSAY OF MAGNESIUM: CPT | Performed by: STUDENT IN AN ORGANIZED HEALTH CARE EDUCATION/TRAINING PROGRAM

## 2023-04-05 PROCEDURE — 80053 COMPREHEN METABOLIC PANEL: CPT | Performed by: STUDENT IN AN ORGANIZED HEALTH CARE EDUCATION/TRAINING PROGRAM

## 2023-04-05 RX ORDER — LIDOCAINE HYDROCHLORIDE 20 MG/ML
INJECTION, SOLUTION EPIDURAL; INFILTRATION; INTRACAUDAL; PERINEURAL AS NEEDED
Status: DISCONTINUED | OUTPATIENT
Start: 2023-04-05 | End: 2023-04-05 | Stop reason: SURG

## 2023-04-05 RX ORDER — OMEPRAZOLE 40 MG/1
40 CAPSULE, DELAYED RELEASE ORAL DAILY
Qty: 30 CAPSULE | Refills: 0 | Status: ON HOLD | OUTPATIENT
Start: 2023-04-05 | End: 2023-05-05

## 2023-04-05 RX ORDER — PROPOFOL 10 MG/ML
VIAL (ML) INTRAVENOUS AS NEEDED
Status: DISCONTINUED | OUTPATIENT
Start: 2023-04-05 | End: 2023-04-05 | Stop reason: SURG

## 2023-04-05 RX ORDER — CEFDINIR 300 MG/1
300 CAPSULE ORAL EVERY 12 HOURS SCHEDULED
Qty: 8 CAPSULE | Refills: 0 | Status: SHIPPED | OUTPATIENT
Start: 2023-04-05 | End: 2023-04-09

## 2023-04-05 RX ORDER — CEFDINIR 300 MG/1
300 CAPSULE ORAL EVERY 12 HOURS SCHEDULED
Status: DISCONTINUED | OUTPATIENT
Start: 2023-04-05 | End: 2023-04-05 | Stop reason: HOSPADM

## 2023-04-05 RX ORDER — SODIUM CHLORIDE, SODIUM LACTATE, POTASSIUM CHLORIDE, CALCIUM CHLORIDE 600; 310; 30; 20 MG/100ML; MG/100ML; MG/100ML; MG/100ML
30 INJECTION, SOLUTION INTRAVENOUS CONTINUOUS
Status: DISCONTINUED | OUTPATIENT
Start: 2023-04-05 | End: 2023-04-05 | Stop reason: HOSPADM

## 2023-04-05 RX ADMIN — LISINOPRIL 20 MG: 20 TABLET ORAL at 09:02

## 2023-04-05 RX ADMIN — SODIUM CHLORIDE, POTASSIUM CHLORIDE, SODIUM LACTATE AND CALCIUM CHLORIDE 30 ML/HR: 600; 310; 30; 20 INJECTION, SOLUTION INTRAVENOUS at 10:34

## 2023-04-05 RX ADMIN — Medication 10 ML: at 08:38

## 2023-04-05 RX ADMIN — LIDOCAINE HYDROCHLORIDE 50 MG: 20 INJECTION, SOLUTION EPIDURAL; INFILTRATION; INTRACAUDAL; PERINEURAL at 12:01

## 2023-04-05 RX ADMIN — LIDOCAINE HYDROCHLORIDE 50 MG: 20 INJECTION, SOLUTION EPIDURAL; INFILTRATION; INTRACAUDAL; PERINEURAL at 12:04

## 2023-04-05 RX ADMIN — POTASSIUM CHLORIDE 10 MEQ: 7.46 INJECTION, SOLUTION INTRAVENOUS at 00:27

## 2023-04-05 RX ADMIN — PANTOPRAZOLE SODIUM 40 MG: 40 INJECTION, POWDER, FOR SOLUTION INTRAVENOUS at 06:04

## 2023-04-05 RX ADMIN — REVEFENACIN 175 MCG: 175 SOLUTION RESPIRATORY (INHALATION) at 07:03

## 2023-04-05 RX ADMIN — CITALOPRAM HYDROBROMIDE 20 MG: 20 TABLET ORAL at 06:04

## 2023-04-05 RX ADMIN — DOCUSATE SODIUM 50MG AND SENNOSIDES 8.6MG 2 TABLET: 8.6; 5 TABLET, FILM COATED ORAL at 08:38

## 2023-04-05 RX ADMIN — ARFORMOTEROL TARTRATE 15 MCG: 15 SOLUTION RESPIRATORY (INHALATION) at 07:03

## 2023-04-05 RX ADMIN — PROPOFOL 40 MG: 10 INJECTION, EMULSION INTRAVENOUS at 12:01

## 2023-04-05 RX ADMIN — PROPOFOL 175 MCG/KG/MIN: 10 INJECTION, EMULSION INTRAVENOUS at 12:01

## 2023-04-05 NOTE — ANESTHESIA POSTPROCEDURE EVALUATION
Patient: Bessy Leggett    Procedure Summary     Date: 04/05/23 Room / Location: [facility] ENDOSCOPY 4 / [facility] ENDOSCOPY    Anesthesia Start: 1159 Anesthesia Stop: 1217    Procedure: ESOPHAGOGASTRODUODENOSCOPY WITH BIOPSIES Diagnosis:       Epigastric pain      (Epigastric pain [R10.13])    Surgeons: Shaina Pastarna MD Provider: Lorenzo Rose MD    Anesthesia Type: general ASA Status: 3          Anesthesia Type: general    Vitals  Vitals Value Taken Time   BP     Temp     Pulse 84 04/05/23 1219   Resp     SpO2 96 % 04/05/23 1219   Vitals shown include unvalidated device data.        Post Anesthesia Care and Evaluation    Patient location during evaluation: bedside  Patient participation: complete - patient participated  Level of consciousness: awake  Pain management: adequate    Airway patency: patent  PONV Status: none  Cardiovascular status: acceptable and stable  Respiratory status: acceptable  Hydration status: acceptable    Comments: An Anesthesiologist personally participated in the most demanding procedures (including induction and emergence if applicable) in the anesthesia plan, monitored the course of anesthesia administration at frequent intervals and remained physically present and available for immediate diagnosis and treatment of emergencies.

## 2023-04-05 NOTE — DISCHARGE SUMMARY
Physician Discharge Summary    Patient Identification  PATIENT IDENTIFICATION    Name: Bessy Leggett  :  1950  MRN: [id number]    Admit date: 2023    Discharge date: 2023     Admitting Physician: Guillermina Sampson MD     Discharge Physician: Jermaine Trujillo MD     Admission Diagnoses: Melena [K92.1]    Hospital Problems:   Principal Problem:  Melena   Active Problems:  Problems Addressed this Visit        Gastrointestinal Abdominal     Epigastric pain - Primary    Relevant Orders    Case Request (Completed)    Tissue Pathology Exam   Other Visit Diagnoses     Decreased activities of daily living (ADL)          Diagnoses       Codes Comments    Epigastric pain    -  Primary ICD-10-CM: R10.13  ICD-9-CM: 789.06     Decreased activities of daily living (ADL)     ICD-10-CM: Z78.9  ICD-9-CM: V49.89            Discharged Condition: stable    Consults: IP CONSULT TO HOSPITALIST  IP CONSULT TO ADVANCE CARE PLANNING  IP CONSULT TO CASE MANAGEMENT   IP CONSULT TO NUTRITION SERVICES  IP CONSULT TO GASTROENTEROLOGY    Imaging:   Imaging Results (Last 24 Hours)     Procedure Component Value Units Date/Time    CT Abdomen Pelvis With Contrast [[id number]] Collected: 23     Updated: 23    Narrative:      PROCEDURE: CT ABDOMEN PELVIS W CONTRAST     COMPARISON: [facility], CT, CT ABDOMEN PELVIS W CONTRAST, 2022, 13:52.     INDICATIONS: DECREASED URINE AND DARK STOOLS X 2WEEKS, GRADUALLY WORSENING     TECHNIQUE: After obtaining the patient's consent, CT images were created with non-ionic intravenous   contrast material.       PROTOCOL:   Standard imaging protocol performed      RADIATION:   DLP: 277.3 mGy*cm    Automated exposure control was utilized to minimize radiation dose.   CONTRAST: 95  cc Isovue 370 I.V.  LABS:   eGFR: 63.2 ml/min/1.73m2     FINDINGS:   The visualized portions of the lung bases demonstrate no acute process.      The liver, pancreas  and spleen demonstrate no acute process.  Small hepatic cysts are present along   the right hepatic lobe.  The gallbladder appears unremarkable.  No evidence of biliary dilatation.   The adrenal glands appear unremarkable.  There is atrophy of the left kidney, stable as compared to   the previous study..  No evidence of nephrolithiasis.  No evidence of hydronephrosis.  No focal   lesions identified.  Right renal cyst present.  Infrarenal abdominal aortic aneurysm present with   the aneurysm sac measuring up to 5.6 cm, previously measuring up to 5.3 cm.  Atherosclerotic   calcifications are present. .     No dilated  loops of bowel identified.  No evidence of obstruction.  No free air.  Moderate stool   burden present throughout the colon.  No mesenteric fluid collections identified.  The appendix   appears unremarkable.  No suspicious adenopathy identified.  No significant free fluid.  The pelvic   organs demonstrate no acute process.  No acute osseous abnormality is identified.          Impression:         1. No acute intra-abdominal or intrapelvic process.  2. Infrarenal abdominal aortic aneurysm measuring up to 5.6 cm.  The aneurysm sac previously   measured up to 5.3 cm.  An endoleak cannot be excluded.  This the patient is status post aorta   bi-iliac graft repair. No evidence of contrast extravasation  3. Ancillary findings as described above.           DOMINICK ANTONY MD         Electronically Signed and Approved By: DOMINICK ANTONY MD on 4/04/2023 at 19:21                           Labs:   Lab Results (last 24 hours)     Procedure Component Value Units Date/Time    Tissue Pathology Exam [[id number]] Collected: 04/05/23 1207    Specimen: Tissue from Gastric, Antrum Updated: 04/05/23 1324    Urine Culture - Urine, Urine, Clean Catch [[id number]]  (Normal) Collected: 04/04/23 1736    Specimen: Urine, Clean Catch Updated: 04/05/23 1254     Urine Culture Growth present, too young to evaluate    Comprehensive Metabolic  Panel [[id number]]  (Abnormal) Collected: 04/05/23 0545    Specimen: Blood Updated: 04/05/23 0634     Glucose 87 mg/dL      BUN 9 mg/dL      Creatinine 0.67 mg/dL      Sodium 136 mmol/L      Potassium 3.6 mmol/L      Chloride 103 mmol/L      CO2 26.6 mmol/L      Calcium 8.7 mg/dL      Total Protein 5.4 g/dL      Albumin 3.4 g/dL      ALT (SGPT) <5 U/L      AST (SGOT) 11 U/L      Alkaline Phosphatase 51 U/L      Total Bilirubin 0.4 mg/dL      Globulin 2.0 gm/dL      A/G Ratio 1.7 g/dL      BUN/Creatinine Ratio 13.4     Anion Gap 6.4 mmol/L      eGFR 93.0 mL/min/1.73     Narrative:      GFR Normal >60  Chronic Kidney Disease <60  Kidney Failure <15    The GFR formula is only valid for adults with stable renal function between ages 18 and 70.    Magnesium [[id number]]  (Normal) Collected: 04/05/23 0545    Specimen: Blood Updated: 04/05/23 0615     Magnesium 1.6 mg/dL     Phosphorus [[id number]]  (Normal) Collected: 04/05/23 0545    Specimen: Blood Updated: 04/05/23 0614     Phosphorus 3.0 mg/dL     POC Glucose Once [[id number]]  (Normal) Collected: 04/05/23 0557    Specimen: Blood Updated: 04/05/23 0559     Glucose 84 mg/dL      Comment: Serial Number: [id number]:  [id number]       CBC Auto Differential [[id number]]  (Abnormal) Collected: 04/05/23 0545    Specimen: Blood Updated: 04/05/23 0558     WBC 4.34 10*3/mm3      RBC 4.25 10*6/mm3      Hemoglobin 13.6 g/dL      Hematocrit 39.6 %      MCV 93.2 fL      MCH 32.0 pg      MCHC 34.3 g/dL      RDW 12.6 %      RDW-SD 43.3 fl      MPV 9.1 fL      Platelets 152 10*3/mm3      Neutrophil % 73.2 %      Lymphocyte % 13.4 %      Monocyte % 11.8 %      Eosinophil % 1.2 %      Basophil % 0.2 %      Immature Grans % 0.2 %      Neutrophils, Absolute 3.18 10*3/mm3      Lymphocytes, Absolute 0.58 10*3/mm3      Monocytes, Absolute 0.51 10*3/mm3      Eosinophils, Absolute 0.05 10*3/mm3      Basophils, Absolute 0.01 10*3/mm3      Immature Grans, Absolute 0.01 10*3/mm3      nRBC 0.0  /100 WBC     POC Glucose Once [[id number]]  (Normal) Collected: 04/04/23 2333    Specimen: Blood Updated: 04/04/23 2336     Glucose 95 mg/dL      Comment: Serial Number: [id number]:  [id number]       Blood Culture - Blood, Arm, Left [[id number]] Collected: 04/04/23 1921    Specimen: Blood from Arm, Left Updated: 04/04/23 2012    Blood Culture - Blood, Arm, Left [[id number]] Collected: 04/04/23 1921    Specimen: Blood from Arm, Left Updated: 04/04/23 2011    Occult Blood, Fecal By Immunoassay - Stool, Per Rectum [[id number]]  (Normal) Collected: 04/04/23 1731    Specimen: Stool from Per Rectum Updated: 04/04/23 1755     Occult Blood, Fecal by Immunoassay Negative    Urinalysis With Microscopic If Indicated (No Culture) - Urine, Clean Catch [[id number]]  (Abnormal) Collected: 04/04/23 1736    Specimen: Urine, Clean Catch Updated: 04/04/23 1755     Color, UA Yellow     Appearance, UA Cloudy     pH, UA 7.0     Specific Gravity, UA 1.016     Glucose, UA Negative     Ketones, UA Negative     Bilirubin, UA Negative     Blood, UA Negative     Protein, UA Negative     Leuk Esterase, UA Small (1+)     Nitrite, UA Negative     Urobilinogen, UA 2.0 E.U./dL    Urinalysis, Microscopic Only - Urine, Clean Catch [[id number]]  (Abnormal) Collected: 04/04/23 1736    Specimen: Urine, Clean Catch Updated: 04/04/23 1755     RBC, UA 0-2 /HPF      WBC, UA 6-12 /HPF      Bacteria, UA 4+ /HPF      Squamous Epithelial Cells, UA 0-2 /HPF      Hyaline Casts, UA 0-2 /LPF      Methodology Automated Microscopy    Comprehensive Metabolic Panel [[id number]]  (Abnormal) Collected: 04/04/23 1617    Specimen: Blood Updated: 04/04/23 1645     Glucose 189 mg/dL      BUN 15 mg/dL      Creatinine 0.88 mg/dL      Sodium 140 mmol/L      Potassium 3.3 mmol/L      Chloride 100 mmol/L      CO2 28.3 mmol/L      Calcium 9.8 mg/dL      Total Protein 6.6 g/dL      Albumin 4.1 g/dL      ALT (SGPT) 5 U/L      AST (SGOT) 12 U/L      Alkaline Phosphatase 68 U/L       Total Bilirubin 0.6 mg/dL      Globulin 2.5 gm/dL      A/G Ratio 1.6 g/dL      BUN/Creatinine Ratio 17.0     Anion Gap 11.7 mmol/L      eGFR 69.9 mL/min/1.73     Narrative:      GFR Normal >60  Chronic Kidney Disease <60  Kidney Failure <15    The GFR formula is only valid for adults with stable renal function between ages 18 and 70.    Lipase [[id number]]  (Normal) Collected: 04/04/23 1617    Specimen: Blood Updated: 04/04/23 1645     Lipase 30 U/L     Lactic Acid, Plasma [[id number]]  (Normal) Collected: 04/04/23 1617    Specimen: Blood Updated: 04/04/23 1642     Lactate 1.1 mmol/L     [place] Draw [[id number]] Collected: 04/04/23 1617    Specimen: Blood Updated: 04/04/23 1639    Narrative:      The following orders were created for panel order [place] Draw.  Procedure                               Abnormality         Status                     ---------                               -----------         ------                     Green Top (Gel)[[id number]]                                  Final result               Lavender Top[[id number]]                                     Final result               Gold Top - SST[[id number]]                                   Final result               Light Blue Top[[id number]]                                   Final result                 Please view results for these tests on the individual orders.    Gold Top - SST [[id number]] Collected: 04/04/23 1617    Specimen: Blood Updated: 04/04/23 1639     Extra Tube Hold for add-ons.     Comment: Auto resulted.       Green Top (Gel) [[id number]] Collected: 04/04/23 1617    Specimen: Blood Updated: 04/04/23 1639     Extra Tube Hold for add-ons.     Comment: Auto resulted.       Light Blue Top [[id number]] Collected: 04/04/23 1617    Specimen: Blood Updated: 04/04/23 1639     Extra Tube Hold for add-ons.     Comment: Auto resulted       Lavender Top [[id number]] Collected: 04/04/23 1617    Specimen: Blood Updated: 04/04/23 1628     Extra Tube  hold for add-on     Comment: Auto resulted       CBC & Differential [[id number]]  (Abnormal) Collected: 04/04/23 1617    Specimen: Blood Updated: 04/04/23 1626    Narrative:      The following orders were created for panel order CBC & Differential.  Procedure                               Abnormality         Status                     ---------                               -----------         ------                     CBC Auto Differential[[id number]]        Abnormal            Final result                 Please view results for these tests on the individual orders.    CBC Auto Differential [[id number]]  (Abnormal) Collected: 04/04/23 1617    Specimen: Blood Updated: 04/04/23 1626     WBC 7.46 10*3/mm3      RBC 5.03 10*6/mm3      Hemoglobin 16.1 g/dL      Hematocrit 46.8 %      MCV 93.0 fL      MCH 32.0 pg      MCHC 34.4 g/dL      RDW 12.6 %      RDW-SD 43.3 fl      MPV 9.5 fL      Platelets 188 10*3/mm3      Neutrophil % 63.1 %      Lymphocyte % 24.7 %      Monocyte % 9.5 %      Eosinophil % 1.9 %      Basophil % 0.5 %      Immature Grans % 0.3 %      Neutrophils, Absolute 4.71 10*3/mm3      Lymphocytes, Absolute 1.84 10*3/mm3      Monocytes, Absolute 0.71 10*3/mm3      Eosinophils, Absolute 0.14 10*3/mm3      Basophils, Absolute 0.04 10*3/mm3      Immature Grans, Absolute 0.02 10*3/mm3      nRBC 0.0 /100 WBC             Hospital Course:   73 y.o. female with a past medical history of hypertension, asthma, COPD, type 2 diabetes mellitus, likely dementia presented to the ED for evaluation of intermittent dark/black stools occurring over the past few months. On admission Hb was 17 and Cr 1.07. Patient was admitted and started on IV protonix for possible UGI bleed and IVF for her DELANO. Her Hb was stable around 14 and Cr was down to 0.6. Family states that she has been progressively declining over the past 6 months and not eating and therefore losing weight. They have an appointment with Neurology in the coming weeks as  they feel like she likely has dementia. I advised them to keep that appointment and told them to continue encouraging her to eat as much as possible at home. She is otherwise stable for discharge to home with family care    Discharge Exam:  Gen: up in bed, in NAD  CV:  RRR, no m/r/g, no peripheral edema  Resp: CTAB, no increase work of breathing  Abd: soft, NT, ND, bs present  Neuro: moves all 4 ext, following commands  Ext: no clubbing, cyanosis or edema  Psych: AAOx1, pleasant affect    Disposition: Home    Patient Discharge Medications:      Discharge Medications      New Medications      Instructions Start Date   cefdinir 300 MG capsule  Commonly known as: OMNICEF   300 mg, Oral, Every 12 Hours Scheduled         Changes to Medications      Instructions Start Date   omeprazole 40 MG capsule  Commonly known as: priLOSEC  What changed:   · when to take this  · reasons to take this   40 mg, Oral, Daily         Continue These Medications      Instructions Start Date   albuterol sulfate  (90 Base) MCG/ACT inhaler  Commonly known as: PROVENTIL HFA;VENTOLIN HFA;PROAIR HFA   2 puffs, Inhalation, Every 4 Hours PRN      citalopram 20 MG tablet  Commonly known as: CeleXA   20 mg, Oral, Every Morning      lisinopril 20 MG tablet  Commonly known as: PRINIVIL,ZESTRIL   20 mg, Oral, Daily      pravastatin 40 MG tablet  Commonly known as: PRAVACHOL   40 mg, Oral, Daily      Umeclidinium-Vilanterol 62.5-25 MCG/ACT aerosol powder  inhaler  Commonly known as: Anoro Ellipta   1 puff, Inhalation, Daily            Follow-up Information     Erich Joya DO .    Specialty: Family Medicine  Contact information:  [facility] [address] [place] [address] [phone]                             Signed:  Arden Trujillo MD  Hospitalist Group  4/5/2023  13:36 EDT      I spent 32 minutes arranging and coordinating discharge and reviewing medications with majority of time spent counseling patient

## 2023-04-05 NOTE — H&P
[facility]  Date: 2023   Patient Name: Bessy Leggett  : 1950  MRN: [id number]  Primary Care Physician:  Erich Joya DO  Date of admission: 2023    Subjective   Subjective     Chief Complaint: Melena    HPI:    Bessy Leggett is a 73 y.o. female with a past medical history of hypertension, asthma, COPD, type 2 diabetes mellitus, possible dementia presented to the ED for evaluation of intermittent dark/black stools occurring over the past few months, noted to have more frequent events in the last few days associated with an episode of dark red emesis few weeks ago.  Patient also has a low urine output in the last few days.  Associated with epigastric abdominal pain since 2 weeks intermittently.  Not associated with food intake.  Denies any fevers, chills, nausea, vomiting, chest pain, shortness of breath.  As per the patient's daughter, patient experiences intermittent loose stools followed by constipation for few days.  Did not lose any weight recently.  Not on iron pills.    In the ED, vital signs temperature 98.7, pulse 97, respiratory 22, blood pressure 156/106 on room air saturating around 95%.  Labs showed sodium 140, potassium 3.3, creatinine 0.88, normal lactic acid, rest of the CMP is normal.  Normal WBC hemoglobin 16.1, platelets 188.  Urinalysis shows cloudy appearance, small leukocytes, 6-12 WBC, 4+ bacteria.  Fecal occult negative.  CT abdomen pelvis with contrast showed no acute intracranial abdominal or intrapelvic process.  Infrarenal abdominal aortic aneurysm measuring up to 5.6 cm the aneurysm sac previously measured was 5.3 g.  An endoleak cannot be excluded.  Patient is s/p aorto by iliac graft repair.  No evidence of contrast extravasation.  Case has been discussed by the ED physician with the gastroenterologist on-call Dr. Pastrana, will be evaluating the patient in the morning.  Recommended n.p.o.        Personal History     Past Medical  History:   Diagnosis Date   • Anxiety 05/23/2018    PATIENT REPORTS A LONG HISTORY OF ANXIETY ALONG IWTH AGOURA PHOBIA. SHE HAS BEEN PREVIOUSLY BEEN SEEN BY PSYCH RECENTLY., HER XANAX WAS STOPPED. I WILL REFER THE PATIENT FOR EVALUATION BY MENTAL HEALTH SPECIALIST   • Asthma    • Cataract    • COPD (chronic obstructive pulmonary disease)    • Depression    • GERD (gastroesophageal reflux disease)    • Hypertension    • IBS (irritable bowel syndrome)    • Low back pain          Past Surgical History:   Procedure Laterality Date   • ABDOMINAL SURGERY     • COLONOSCOPY     • GALLBLADDER SURGERY     • HYSTERECTOMY     • VASCULAR SURGERY           Family History   Problem Relation Age of Onset   • Stroke Other    • Heart disease Other    • Hypertension Other    • Cancer Other    • Diabetes Other          Social History     Socioeconomic History   • Marital status: Legally    Tobacco Use   • Smoking status: Every Day   • Smokeless tobacco: Never   Substance and Sexual Activity   • Alcohol use: Never   • Drug use: Never   • Sexual activity: Defer         Home Medications:  Umeclidinium-Vilanterol, albuterol sulfate HFA, citalopram, lisinopril, omeprazole, and pravastatin    Allergies:  Allergies   Allergen Reactions   • Penicillins Itching, Rash, Shortness Of Breath and Swelling   • Buspirone Unknown - High Severity   • Ibuprofen Irritability   • Rofecoxib Unknown - Low Severity   • Sumatriptan Unknown - High Severity   • Tramadol Unknown - High Severity   • Tylenol With Codeine #3 [Acetaminophen-Codeine] Unknown - High Severity       Review of Systems   All other systems reviewed and negative except as mentioned above in the HPI    Objective   Objective     Vitals:   Temp:  [98.7 °F (37.1 °C)] 98.7 °F (37.1 °C)  Heart Rate:  [80-97] 82  Resp:  [22] 22  BP: (143-164)/() 161/90    Physical Exam    Constitutional: Awake, alert, no acute distress   Eyes: Pupils equal, sclerae anicteric, no conjunctival  injection   HENT: NCAT, mucous membranes moist   Neck: Supple, no thyromegaly, no lymphadenopathy, trachea midline   Respiratory: Bilateral air entry present, mild diffuse wheezing bilaterally, nonlabored respirations    Cardiovascular: RRR, no murmurs, rubs, or gallops, palpable pedal pulses bilaterally   Gastrointestinal: Positive bowel sounds, soft, nontender, nondistended   Musculoskeletal: No bilateral ankle edema, no clubbing or cyanosis to extremities   Psychiatric: Appropriate affect, cooperative   Neurologic: Oriented x 3, strength symmetric in all extremities, Cranial Nerves grossly intact to confrontation, speech clear   Skin: No rashes     Result Review    Result Review:  I have personally reviewed the results from the time of this admission to 4/4/2023 21:40 EDT and agree with these findings:  [x]  Laboratory  []  Microbiology  [x]  Radiology  []  EKG/Telemetry   [x]  Cardiology/Vascular   []  Pathology  []  Old records  []  Other:        Imaging Results (Last 24 Hours)     Procedure Component Value Units Date/Time    CT Abdomen Pelvis With Contrast [[id number]] Collected: 04/04/23 1921     Updated: 04/04/23 1924    Narrative:      PROCEDURE: CT ABDOMEN PELVIS W CONTRAST     COMPARISON: [facility], CT, CT ABDOMEN PELVIS W CONTRAST, 5/01/2022, 13:52.     INDICATIONS: DECREASED URINE AND DARK STOOLS X 2WEEKS, GRADUALLY WORSENING     TECHNIQUE: After obtaining the patient's consent, CT images were created with non-ionic intravenous   contrast material.       PROTOCOL:   Standard imaging protocol performed      RADIATION:   DLP: 277.3 mGy*cm    Automated exposure control was utilized to minimize radiation dose.   CONTRAST: 95  cc Isovue 370 I.V.  LABS:   eGFR: 63.2 ml/min/1.73m2     FINDINGS:   The visualized portions of the lung bases demonstrate no acute process.      The liver, pancreas and spleen demonstrate no acute process.  Small hepatic cysts are present along   the right hepatic lobe.   The gallbladder appears unremarkable.  No evidence of biliary dilatation.   The adrenal glands appear unremarkable.  There is atrophy of the left kidney, stable as compared to   the previous study..  No evidence of nephrolithiasis.  No evidence of hydronephrosis.  No focal   lesions identified.  Right renal cyst present.  Infrarenal abdominal aortic aneurysm present with   the aneurysm sac measuring up to 5.6 cm, previously measuring up to 5.3 cm.  Atherosclerotic   calcifications are present. .     No dilated  loops of bowel identified.  No evidence of obstruction.  No free air.  Moderate stool   burden present throughout the colon.  No mesenteric fluid collections identified.  The appendix   appears unremarkable.  No suspicious adenopathy identified.  No significant free fluid.  The pelvic   organs demonstrate no acute process.  No acute osseous abnormality is identified.          Impression:         1. No acute intra-abdominal or intrapelvic process.  2. Infrarenal abdominal aortic aneurysm measuring up to 5.6 cm.  The aneurysm sac previously   measured up to 5.3 cm.  An endoleak cannot be excluded.  This the patient is status post aorta   bi-iliac graft repair. No evidence of contrast extravasation  3. Ancillary findings as described above.           DOMINICK ANTONY MD         Electronically Signed and Approved By: DOMINICK ANTONY MD on 4/04/2023 at 19:21                            arformoterol, 15 mcg, Nebulization, BID - RT  [START ON 4/5/2023] cefTRIAXone, 1 g, Intravenous, Q24H  [START ON 4/5/2023] citalopram, 20 mg, Oral, QAM  [START ON 4/5/2023] insulin regular, 2-7 Units, Subcutaneous, Q6H  [START ON 4/5/2023] lisinopril, 20 mg, Oral, Daily  pantoprazole, 40 mg, Intravenous, Q AM  potassium chloride, 10 mEq, Intravenous, Q1H  [START ON 4/5/2023] pravastatin, 40 mg, Oral, Daily  [START ON 4/5/2023] revefenacin, 175 mcg, Nebulization, Daily - RT  senna-docusate sodium, 2 tablet, Oral, BID  sodium chloride, 10 mL,  Intravenous, Q12H         Assessment & Plan   Assessment / Plan     Assessment/Plan:   Melena  Likely upper GI bleed  UTI  Hypokalemia  Possible early dementia  Hypertension  Asthma  COPD  Type 2 diabetes mellitus  Hyperlipidemia    Plan  Admit under observation, remote telemetry  IV pantoprazole 40 mg daily  Clear liquid diet  N.p.o. after midnight except sips with meds  Nursing bedside swallow  LR at 75 cc/h for next 12 hours  GI consulted by ED physician, Dr. Pastrana is aware of the patient  Brovana, revefenacin nebulizer scheduled  Xopenex every 8 hours as needed  Bronchodilator protocol  20 mEq IV potassium, monitor with a.m. labs  Resume other appropriate home medications once reconciled  Low-dose sliding scale insulin    DVT prophylaxis:  Mechanical DVT prophylaxis orders are present.    CODE STATUS:    Level Of Support Discussed With: Patient; Health Care Surrogate  Code Status (Patient has no pulse and is not breathing): CPR (Attempt to Resuscitate)  Medical Interventions (Patient has pulse or is breathing): Full Support      Admission Status:  I believe this patient meets observation status.    Part of this note may be an electronic transcription/translation of spoken language to printed text using the Dragon Dictation System    Guillermina Sampson MD

## 2023-04-05 NOTE — THERAPY EVALUATION
Patient Name: Bessy Leggett  : 1950    MRN: [id number]                              Today's Date: 2023       Admit Date: 2023    Visit Dx:     ICD-10-CM ICD-9-CM   1. Epigastric pain  R10.13 789.06   2. Decreased activities of daily living (ADL)  Z78.9 V49.89     Patient Active Problem List   Diagnosis   • AAA (abdominal aortic aneurysm) without rupture   • Hypertension   • Anxiety   • Asthma   • Cataract   • COPD (chronic obstructive pulmonary disease)   • Depression   • Non-insulin dependent type 2 diabetes mellitus   • Family history of blood clots   • GERD (gastroesophageal reflux disease)   • History of AAA (abdominal aortic aneurysm) repair   • History of seizure   • Hypercholesterolemia   • IBS (irritable bowel syndrome)   • Low back pain   • Melena   • Epigastric pain     Past Medical History:   Diagnosis Date   • Anxiety 2018    PATIENT REPORTS A LONG HISTORY OF ANXIETY ALONG IWTH AGOURA PHOBIA. SHE HAS BEEN PREVIOUSLY BEEN SEEN BY PSYCH RECENTLY., HER XANAX WAS STOPPED. I WILL REFER THE PATIENT FOR EVALUATION BY MENTAL HEALTH SPECIALIST   • Asthma    • Cataract    • COPD (chronic obstructive pulmonary disease)    • Depression    • GERD (gastroesophageal reflux disease)    • Hypertension    • IBS (irritable bowel syndrome)    • Low back pain      Past Surgical History:   Procedure Laterality Date   • ABDOMINAL SURGERY     • COLONOSCOPY     • GALLBLADDER SURGERY     • HYSTERECTOMY     • VASCULAR SURGERY        General Information     Row Name 23 1030          OT Time and Intention    Document Type evaluation  -AV     Mode of Treatment individual therapy;occupational therapy  -AV     Row Name 23 1030          General Information    Patient Profile Reviewed yes  -AV     Prior Level of Function --  Per daughter: (I) feeding. mod assist grooming. mod-max assist bathing/dressing (tub seat). assist for toilet hygiene using elevated commode. ambulates short distances w rollator. no  home O2.  -AV     Existing Precautions/Restrictions fall;NPO  -AV     Barriers to Rehab previous functional deficit  -AV     Row Name 04/05/23 1030          Occupational Profile    Reason for Services/Referral (Occupational Profile) Patient is a 73 year old female admitted to [facility] on 4/4/23. She is on 4NT/ room air. OT consulted due to recent decline in ADL/transfer independence. No previous OT services for current condition.  -AV     Row Name 04/05/23 1030          Living Environment    People in Home child(anthony), adult  daughters rotate  -AV     Row Name 04/05/23 1030          Home Main Entrance    Number of Stairs, Main Entrance none  -AV     Row Name 04/05/23 1030          Stairs Within Home, Primary    Number of Stairs, Within Home, Primary none  -AV     Row Name 04/05/23 1030          Cognition    Orientation Status (Cognition) --  alert, pleasantly confused. poor historian. able to follow commands with some repeated direction required.  -AV     Row Name 04/05/23 1030          Safety Issues, Functional Mobility    Impairments Affecting Function (Mobility) balance;cognition;endurance/activity tolerance;strength  -AV           User Key  (r) = Recorded By, (t) = Taken By, (c) = Cosigned By    Initials Name Provider Type    Yusuf Oswald OT Occupational Therapist                 Mobility/ADL's     Row Name 04/05/23 1035          Transfers    Comment, (Transfers) supine to long-sitting max assist  -AV     Row Name 04/05/23 1035          Activities of Daily Living    BADL Assessment/Intervention --  NPO. Max-Dependent ADLs. incontinent.  -AV           User Key  (r) = Recorded By, (t) = Taken By, (c) = Cosigned By    Initials Name Provider Type    Yusuf Oswald OT Occupational Therapist               Obj/Interventions     Row Name 04/05/23 1036          Sensory Assessment (Somatosensory)    Sensory Assessment (Somatosensory) UE sensation intact  -AV     Row Name 04/05/23 1036          Vision  Assessment/Intervention    Visual Impairment/Limitations WFL  -AV     Row Name 04/05/23 1036          Range of Motion Comprehensive    General Range of Motion upper extremity range of motion deficits identified  -AV     Comment, General Range of Motion bilateral shoulder AROM <90 prior to onset per EMR review. further BUE AROM WFL.  -AV     Row Name 04/05/23 1036          Strength Comprehensive (MMT)    Comment, General Manual Muscle Testing (MMT) Assessment 4(-) bilateral biceps, triceps and   -AV     Row Name 04/05/23 1036          Motor Skills    Motor Skills coordination;functional endurance  -AV     Coordination --  right dominant  -AV     Functional Endurance poor  -AV     Row Name 04/05/23 1036          Balance    Comment, Balance static sitting min assist. unable to stand at this time.  -AV           User Key  (r) = Recorded By, (t) = Taken By, (c) = Cosigned By    Initials Name Provider Type    Yusuf Oswald OT Occupational Therapist               Goals/Plan     Row Name 04/05/23 1041          Transfer Goal 1 (OT)    Activity/Assistive Device (Transfer Goal 1, OT) transfers, all;walker, rolling  -AV     [place] Level/Cues Needed (Transfer Goal 1, OT) contact guard required;minimum assist (75% or more patient effort);verbal cues required  -AV     Time Frame (Transfer Goal 1, OT) long term goal (LTG);10 days  -AV     [facility] Name 04/05/23 1041          Bathing Goal 1 (OT)    Activity/Device (Bathing Goal 1, OT) bathing skills, all;tub bench  -AV     [place] Level/Cues Needed (Bathing Goal 1, OT) modified independence;set-up required;verbal cues required  -AV     Time Frame (Bathing Goal 1, OT) long term goal (LTG);10 days  -AV     [facility] Name 04/05/23 1041          Dressing Goal 1 (OT)    Activity/Device (Dressing Goal 1, OT) dressing skills, all  -AV     [place]/Cues Needed (Dressing Goal 1, OT) moderate assist (50-74% patient effort);set-up required;verbal cues required  -AV     Time Frame  (Dressing Goal 1, OT) long term goal (LTG);10 days  -AV     Row Name 04/05/23 1041          Toileting Goal 1 (OT)    Activity/Device (Toileting Goal 1, OT) toileting skills, all;raised toilet seat  -AV     [place] Level/Cues Needed (Toileting Goal 1, OT) minimum assist (75% or more patient effort);set-up required;verbal cues required  -AV     Time Frame (Toileting Goal 1, OT) long term goal (LTG);10 days  -AV     Row Name 04/05/23 1041          Grooming Goal 1 (OT)    Activity/Device (Grooming Goal 1, OT) grooming skills, all  -AV     [place] (Grooming Goal 1, OT) moderate assist (50-74% patient effort);set-up required;verbal cues required  -AV     Time Frame (Grooming Goal 1, OT) long term goal (LTG);10 days  -AV     Row Name 04/05/23 1041          Strength Goal 1 (OT)    Strength Goal 1 (OT) Patient will demonstrate 4/5 bilateral triceps to increase transfer independence.  -AV     Time Frame (Strength Goal 1, OT) long term goal (LTG);10 days  -AV     Row Name 04/05/23 1041          Problem Specific Goal 1 (OT)    Problem Specific Goal 1 (OT) Patient will demonstrate fair endurance/ activity tolerance needed to support ADLs.  -AV     Time Frame (Problem Specific Goal 1, OT) long term goal (LTG);10 days  -AV     Row Name 04/05/23 1041          Therapy Assessment/Plan (OT)    Planned Therapy Interventions (OT) activity tolerance training;BADL retraining;functional balance retraining;occupation/activity based interventions;patient/caregiver education/training;transfer/mobility retraining;strengthening exercise  -AV           User Key  (r) = Recorded By, (t) = Taken By, (c) = Cosigned By    Initials Name Provider Type    AV Yusuf Hampton, OT Occupational Therapist               Clinical Impression     Row Name 04/05/23 1037          Pain Assessment    Additional Documentation Pain Scale: FACES Pre/Post-Treatment (Group)  -AV     Row Name 04/05/23 1037          Pain Scale: FACES Pre/Post-Treatment    Pain:  FACES Scale, Pretreatment 0-->no hurt  -AV     Posttreatment Pain Rating 0-->no hurt  -AV     Row Name 04/05/23 1037          Plan of Care Review    Plan of Care Reviewed With patient;daughter  -AV     Progress no change  first session: evaluation  -AV     Outcome Evaluation Patient presents with limitations of balance, strength, cognition and endurance/ activity tolerance which are impacting ADL/transfer independence. Skilled OT is indicated to remediate/compensate for deficits to maximize independence with functional tasks.  -AV     Row Name 04/05/23 1037          Therapy Assessment/Plan (OT)    Patient/Family Therapy Goal Statement (OT) maximize independence  -AV     Rehab Potential (OT) good, to achieve stated therapy goals  -AV     Criteria for Skilled Therapeutic Interventions Met (OT) yes;meets criteria;skilled treatment is necessary  -AV     Therapy Frequency (OT) 5 times/wk  -AV     Row Name 04/05/23 1037          Therapy Plan Review/Discharge Plan (OT)    Anticipated Discharge Disposition (OT) home with 24/7 care;home with home health;sub acute care setting  -AV     Row Name 04/05/23 1037          Vital Signs    O2 Delivery Pre Treatment room air  -AV     O2 Delivery Intra Treatment room air  -AV     O2 Delivery Post Treatment room air  -AV           User Key  (r) = Recorded By, (t) = Taken By, (c) = Cosigned By    Initials Name Provider Type    Yusuf Oswald, OT Occupational Therapist               Outcome Measures     Row Name 04/05/23 1043          How much help from another is currently needed...    Putting on and taking off regular lower body clothing? 1  -AV     Bathing (including washing, rinsing, and drying) 2  -AV     Toileting (which includes using toilet bed pan or urinal) 1  -AV     Putting on and taking off regular upper body clothing 1  -AV     Taking care of personal grooming (such as brushing teeth) 2  -AV     Eating meals 1  -AV     AM-PAC 6 Clicks Score (OT) 8  -AV     Row Name  04/05/23 1043          Functional Assessment    Outcome Measure Options AM-PAC 6 Clicks Daily Activity (OT);Optimal Instrument  -AV     Row Name 04/05/23 1043          Optimal Instrument    Optimal Instrument Optimal - 3  -AV     Bending/Stooping 4  -AV     Standing 5  -AV     Reaching 2  -AV     From the list, choose the 3 activities you would most like to be able to do without any difficulty Bending/stooping;Standing;Reaching  -AV     Total Score Optimal - 3 11  -AV           User Key  (r) = Recorded By, (t) = Taken By, (c) = Cosigned By    Initials Name Provider Type    Yusuf Oswald OT Occupational Therapist                Occupational Therapy Education     Title: PT OT SLP Therapies (In Progress)     Topic: Occupational Therapy (In Progress)     Point: ADL training (Done)     Description:   Instruct learner(s) on proper safety adaptation and remediation techniques during self care or transfers.   Instruct in proper use of assistive devices.              Learning Progress Summary           Patient Acceptance, E, VU by AV at 4/5/2023 1044    Comment: daughter present   Family Acceptance, E, VU by AV at 4/5/2023 1044    Comment: daughter present                   Point: Home exercise program (Not Started)     Description:   Instruct learner(s) on appropriate technique for monitoring, assisting and/or progressing therapeutic exercises/activities.              Learner Progress:  Not documented in this visit.          Point: Precautions (Not Started)     Description:   Instruct learner(s) on prescribed precautions during self-care and functional transfers.              Learner Progress:  Not documented in this visit.          Point: Body mechanics (Not Started)     Description:   Instruct learner(s) on proper positioning and spine alignment during self-care, functional mobility activities and/or exercises.              Learner Progress:  Not documented in this visit.                      User Key     Initials  Effective Dates Name Provider Type Discipline    AV 06/16/21 -  Yusuf Hampton OT Occupational Therapist OT              OT Recommendation and Plan  Planned Therapy Interventions (OT): activity tolerance training, BADL retraining, functional balance retraining, occupation/activity based interventions, patient/caregiver education/training, transfer/mobility retraining, strengthening exercise  Therapy Frequency (OT): 5 times/wk  Plan of Care Review  Plan of Care Reviewed With: patient, daughter  Progress: no change (first session: evaluation)  Outcome Evaluation: Patient presents with limitations of balance, strength, cognition and endurance/ activity tolerance which are impacting ADL/transfer independence. Skilled OT is indicated to remediate/compensate for deficits to maximize independence with functional tasks.     Time Calculation:    Time Calculation- OT     Row Name 04/05/23 1046             Time Calculation- OT    OT Received On 04/05/23  -AV      OT Goal Re-Cert Due Date 04/14/23  -AV         Untimed Charges    OT Eval/Re-eval Minutes 35  -AV         Total Minutes    Untimed Charges Total Minutes 35  -AV       Total Minutes 35  -AV            User Key  (r) = Recorded By, (t) = Taken By, (c) = Cosigned By    Initials Name Provider Type    AV Yusuf Hampton OT Occupational Therapist              Therapy Charges for Today     Code Description Service Date Service Provider Modifiers Qty    [id number] HC OT EVAL MOD COMPLEXITY 3 4/5/2023 Yusuf Hampton OT GO 1               Yusuf Hampton OT  4/5/2023

## 2023-04-05 NOTE — OUTREACH NOTE
Prep Survey    Flowsheet Row Responses   [facility] patient discharged from? [facility]   Is LACE score < 7 ? Yes   Eligibility [facility]   Date of Admission 04/04/23   Date of Discharge 04/05/23   Discharge Disposition Home or Self Care   Discharge diagnosis Melena   Does the patient have one of the following disease processes/diagnoses(primary or secondary)? Other   Does the patient have Home health ordered? No   Is there a DME ordered? No   Prep survey completed? Yes          Kristen LIAO - Registered Nurse

## 2023-04-05 NOTE — SIGNIFICANT NOTE
04/04/23 2008   Living Environment   People in Home child(anthony), adult   Name(s) of People in Home 1 Daughter stays with her, Pat Gutierrez (Colleen), the other daughters rotate in and out; Marla Viveros (Michelle), Diana Holder, and Nati Holden   Current Living Arrangements home   Primary Care Provided by child(anthony)   Provides Primary Care For no one, unable/limited ability to care for self   Caregiving Concerns home healthcare would be needed, yet children have it covered and pt does not desire it.   Family Caregiver if Needed child(anthony), adult   Family Caregiver Names stated above   Quality of Family Relationships supportive   Able to Return to Prior Arrangements yes   Living Arrangement Comments none   Resource/Environmental Concerns   Transportation Concerns none   Transition Planning   Patient/Family Anticipates Transition to home   Patient/Family Anticipated Services at Transition none   Transportation Anticipated family or friend will provide   Discharge Needs Assessment   Readmission Within the Last 30 Days no previous admission in last 30 days   Equipment Currently Used at Home walker, rolling   Concerns to be Addressed no discharge needs identified   Anticipated Changes Related to Illness none   Equipment Needed After Discharge none   SW student appeared bedside to pt who was alert and welcomed her daughter Marla Viveros (Michelle) to answer for her. Pt was accompanied by Michelle (HERLINDA) and stated that pt lives in [facility]. D states that pt is well cared for by 4 daughters who rotate in and out of the home. Colleen stays with the pt primarily. D states the pt is in the middle of signing the healthcare POA and living will, yet has not gotten around to finishing it up. D states that the pt is never left alone and always has someone with her. D states that the pt was at home prior to admission and plans to return to the home once discharged. D states that the pt has a rolling walker at home where she can sit on  it and scoot around in it. D states that home healthcare is not used at this time and the pt does not desire it. D states the pt desires her daughter to take care of her. D states that the pt is not on dialysis, VA benefits, and that Dr Joya is her PCP. D states that the pt is not allowed to  herself, her daughters  her around. D states that the pt does not need help with medication at this time and uses the [facility] in [place] by the bypass.  D states that the pt has not been admitted to a hospital or rehab in the last 30 days.

## 2023-04-05 NOTE — CONSULTS
Discharge Planning Assessment  ANNA Bernabe     Patient Name: Bessy Leggett  MRN: [id number]  Today's Date: 4/5/2023    Admit Date: 4/4/2023        Discharge Plan     Row Name 04/05/23 1407       Plan    Final Discharge Disposition Code 01 - home or self-care    Final Note Pt with orders to DC home; family at bedside. Pt and family deny any needs.              Expected Discharge Date and Time     Expected Discharge Date Expected Discharge Time    Apr 5, 2023         RAI Hurt

## 2023-04-05 NOTE — CONSULTS
Pt with documented confusion and possible dementia. Per ED SW notes, pt and family working on AD on an outpatient basis.

## 2023-04-05 NOTE — PLAN OF CARE
Pain to LLE, begins at the L knee radiates down calf to L foot and L heel. Denies specific injury or recent falls.  Also c/o dry, itchy hands, without relief from benadryl. Pt AAOx4 and appropriate at this time. Respirations even and unlabored. No acute distress noted.    Patient resting comfortably in bed at this time. No pain at this time. VS WDL. No other needs expressed at this time. Will continue plan of care.      Problem: Adult Inpatient Plan of Care  Goal: Plan of Care Review  Outcome: Ongoing, Progressing  Goal: Patient-Specific Goal (Individualized)  Outcome: Ongoing, Progressing  Goal: Absence of Hospital-Acquired Illness or Injury  Outcome: Ongoing, Progressing  Intervention: Identify and Manage Fall Risk  Recent Flowsheet Documentation  Taken 4/5/2023 0830 by Karyn Angelo RN  Safety Promotion/Fall Prevention: safety round/check completed  Taken 4/5/2023 0645 by Karyn Angelo RN  Safety Promotion/Fall Prevention: safety round/check completed  Intervention: Prevent and Manage VTE (Venous Thromboembolism) Risk  Recent Flowsheet Documentation  Taken 4/5/2023 0931 by Karyn Angelo RN  Range of Motion:   ROM (range of motion) performed   active ROM (range of motion) encouraged  Goal: Optimal Comfort and Wellbeing  Outcome: Ongoing, Progressing  Goal: Readiness for Transition of Care  Outcome: Ongoing, Progressing     Problem: Fall Injury Risk  Goal: Absence of Fall and Fall-Related Injury  Outcome: Ongoing, Progressing  Intervention: Promote Injury-Free Environment  Recent Flowsheet Documentation  Taken 4/5/2023 0830 by Karyn Angelo RN  Safety Promotion/Fall Prevention: safety round/check completed  Taken 4/5/2023 0645 by Karyn Angelo RN  Safety Promotion/Fall Prevention: safety round/check completed     Problem: Skin Injury Risk Increased  Goal: Skin Health and Integrity  Outcome: Ongoing, Progressing     Problem: Asthma Comorbidity  Goal: Maintenance of Asthma Control  Outcome: Ongoing, Progressing     Problem: COPD (Chronic Obstructive Pulmonary Disease) Comorbidity  Goal: Maintenance of COPD Symptom Control  Outcome: Ongoing, Progressing     Problem: Diabetes Comorbidity  Goal: Blood Glucose Level Within Targeted Range  Outcome: Ongoing, Progressing     Problem:  Hypertension Comorbidity  Goal: Blood Pressure in Desired Range  Outcome: Ongoing, Progressing   Goal Outcome Evaluation:

## 2023-04-05 NOTE — NURSING NOTE
Patient ready for discharge at this time. This RN reviewed AVS paperwork with patient/daughter at the bedside. Patient verbalized understanding. Scripts were sent to RX of choosing. PIV out & bleeding controlled with gauze and tape. This RN answered any and all questions. Patient leaving with all belongings. Leaving via transport/wheelchair.

## 2023-04-05 NOTE — ANESTHESIA PREPROCEDURE EVALUATION
Anesthesia Evaluation     Patient summary reviewed and Nursing notes reviewed   no history of anesthetic complications:  NPO Solid Status: > 8 hours  NPO Liquid Status: > 2 hours           Airway   Mallampati: I  TM distance: >3 FB  Neck ROM: full  No difficulty expected  Dental      Pulmonary - normal exam    breath sounds clear to auscultation  (+) COPD, asthma,  Cardiovascular - normal exam  Exercise tolerance: good (4-7 METS)    Rhythm: regular  Rate: normal    (+) hypertension, hyperlipidemia,       Neuro/Psych  (+) psychiatric history,    GI/Hepatic/Renal/Endo    (+)  GERD,  diabetes mellitus type 2,     Musculoskeletal (-) negative ROS    Abdominal    Substance History - negative use     OB/GYN negative ob/gyn ROS         Other - negative ROS       ROS/Med Hx Other: PAT Nursing Notes unavailable.     Previous aaa repaired endovascular       IMPRESSION:                 1. No acute intra-abdominal or intrapelvic process.  2. Infrarenal abdominal aortic aneurysm measuring up to 5.6 cm.  The aneurysm sac previously   measured up to 5.3 cm.  An endoleak cannot be excluded.  This the patient is status post aorta   bi-iliac graft repair. No evidence of contrast extravasation  3. Ancillary findings as described above.           Anesthesia Plan    ASA 3     general     (Total IV Anesthesia    Patient understands anesthesia not responsible for dental damage.  )  intravenous induction     Anesthetic plan, risks, benefits, and alternatives have been provided, discussed and informed consent has been obtained with: patient.    Plan discussed with CRNA.        CODE STATUS:    Level Of Support Discussed With: Patient; Health Care Surrogate  Code Status (Patient has no pulse and is not breathing): CPR (Attempt to Resuscitate)  Medical Interventions (Patient has pulse or is breathing): Full Support

## 2023-04-05 NOTE — PLAN OF CARE
Goal Outcome Evaluation:  Plan of Care Reviewed With: patient        Progress: no change  Outcome Evaluation: Patient admitted to [facility]. Patient confused, but easily reoriented. No needs at this time. Vital signs stable, will continue to monitor.

## 2023-04-05 NOTE — NURSING NOTE
Patient returned from [facility] at this time. PIV infusing floor fluids. /76, HR 83 resting. Continuing plan of care.

## 2023-04-05 NOTE — PLAN OF CARE
Goal Outcome Evaluation:  Plan of Care Reviewed With: patient, daughter        Progress: no change (first session: evaluation)  Outcome Evaluation: Patient presents with limitations of balance, strength, cognition and endurance/ activity tolerance which are impacting ADL/transfer independence. Skilled OT is indicated to remediate/compensate for deficits to maximize independence with functional tasks.

## 2023-04-05 NOTE — CONSULTS
[facility]  Initial Inpatient Consult Note    Referring Provider: ER    Reason for Consultation: melena    Subjective     History of present illness:    73 y.o. female with history of HTN, DM 2, COPD, and dementia who presented with complaint of intermittent dark stools over the past few months.  Patient reportedly had episode of dark red emesis a few weeks ago.  She reports epigastric abdominal pain over the last couple weeks.  No further nausea, vomiting.  Hemoglobin 16.1 on presentation.  Fecal occult blood negative.    Past Medical History:  Past Medical History:   Diagnosis Date   • Anxiety 05/23/2018    PATIENT REPORTS A LONG HISTORY OF ANXIETY ALONG IWTH AGOURA PHOBIA. SHE HAS BEEN PREVIOUSLY BEEN SEEN BY PSYCH RECENTLY., HER XANAX WAS STOPPED. I WILL REFER THE PATIENT FOR EVALUATION BY MENTAL HEALTH SPECIALIST   • Asthma    • Cataract    • COPD (chronic obstructive pulmonary disease)    • Depression    • GERD (gastroesophageal reflux disease)    • Hypertension    • IBS (irritable bowel syndrome)    • Low back pain      Past Surgical History:  Past Surgical History:   Procedure Laterality Date   • ABDOMINAL SURGERY     • COLONOSCOPY     • GALLBLADDER SURGERY     • HYSTERECTOMY     • VASCULAR SURGERY        Social History:   Social History     Tobacco Use   • Smoking status: Every Day   • Smokeless tobacco: Never   Substance Use Topics   • Alcohol use: Never      Family History:  Family History   Problem Relation Age of Onset   • Stroke Other    • Heart disease Other    • Hypertension Other    • Cancer Other    • Diabetes Other        Home Meds:  Medications Prior to Admission   Medication Sig Dispense Refill Last Dose   • albuterol sulfate  (90 Base) MCG/ACT inhaler Inhale 2 puffs Every 4 (Four) Hours As Needed for Wheezing. 18 g 2 4/4/2023   • citalopram (CeleXA) 20 MG tablet Take 1 tablet by mouth Every Morning for 90 days. 90 tablet 1 4/4/2023   • lisinopril  (PRINIVIL,ZESTRIL) 20 MG tablet Take 1 tablet by mouth Daily. 90 tablet 1 4/4/2023   • omeprazole (priLOSEC) 40 MG capsule Take 1 capsule by mouth Daily As Needed (GERD). 90 capsule 1 4/4/2023   • pravastatin (PRAVACHOL) 40 MG tablet Take 1 tablet by mouth Daily. 90 tablet 1 4/4/2023   • Umeclidinium-Vilanterol (Anoro Ellipta) 62.5-25 MCG/ACT aerosol powder  inhaler Inhale 1 puff Daily. 60 each 2 4/4/2023     Current Meds:   arformoterol, 15 mcg, Nebulization, BID - RT  cefTRIAXone, 1 g, Intravenous, Q24H  citalopram, 20 mg, Oral, QAM  insulin regular, 2-7 Units, Subcutaneous, Q6H  lisinopril, 20 mg, Oral, Daily  pantoprazole, 40 mg, Intravenous, Q AM  pravastatin, 40 mg, Oral, Nightly  revefenacin, 175 mcg, Nebulization, Daily - RT  senna-docusate sodium, 2 tablet, Oral, BID  sodium chloride, 10 mL, Intravenous, Q12H      Allergies:  Allergies   Allergen Reactions   • Penicillins Itching, Rash, Shortness Of Breath and Swelling   • Buspirone Unknown - High Severity   • Ibuprofen Irritability   • Rofecoxib Unknown - Low Severity   • Sumatriptan Unknown - High Severity   • Tramadol Unknown - High Severity   • Tylenol With Codeine #3 [Acetaminophen-Codeine] Unknown - High Severity     Review of Systems  Pertinent items are noted in HPI, all other systems reviewed and negative     Objective     Vital Signs  Temp:  [97.9 °F (36.6 °C)-99.1 °F (37.3 °C)] 97.9 °F (36.6 °C)  Heart Rate:  [80-97] 86  Resp:  [20-22] 20  BP: (141-177)/() 144/82  Physical Exam:  General Appearance:    Alert, cooperative, in no acute distress   Head:    Normocephalic, without obvious abnormality, atraumatic   Eyes:          conjunctivae and sclerae normal, no icterus   Throat:   no thrush, oral mucosa moist   Neck:   Supple, no adenopathy   Lungs:     Breathing unlabored    Heart:    No chest tenderness    Chest Wall:    No abnormalities observed   Abdomen:     Soft, nondistended, nontender   Extremities:   no edema, no redness   Skin:   No  bruising or rash   Psychiatric:  normal mood     Results Review:   I reviewed the patient's new clinical results.    Results from last 7 days   Lab Units 04/05/23  0545 04/04/23  1617   WBC 10*3/mm3 4.34 7.46   HEMOGLOBIN g/dL 13.6 16.1*   HEMATOCRIT % 39.6 46.8*   PLATELETS 10*3/mm3 152 188     Results from last 7 days   Lab Units 04/05/23  0545 04/04/23  1617   SODIUM mmol/L 136 140   POTASSIUM mmol/L 3.6 3.3*   CHLORIDE mmol/L 103 100   CO2 mmol/L 26.6 28.3   BUN mg/dL 9 15   CREATININE mg/dL 0.67 0.88   CALCIUM mg/dL 8.7 9.8   BILIRUBIN mg/dL 0.4 0.6   ALK PHOS U/L 51 68   ALT (SGPT) U/L <5 5   AST (SGOT) U/L 11 12   GLUCOSE mg/dL 87 189*         Lab Results   Lab Value Date/Time    LIPASE 30 04/04/2023 1617    LIPASE 25 05/01/2022 1244    LIPASE 32 12/17/2021 0341    LIPASE 36 08/10/2019 1547       Radiology:  CT Abdomen Pelvis With Contrast   Final Result       1. No acute intra-abdominal or intrapelvic process.   2. Infrarenal abdominal aortic aneurysm measuring up to 5.6 cm.  The aneurysm sac previously    measured up to 5.3 cm.  An endoleak cannot be excluded.  This the patient is status post aorta    bi-iliac graft repair. No evidence of contrast extravasation   3. Ancillary findings as described above.              DOMINICK ANTONY MD          Electronically Signed and Approved By: DOMINICK ANTONY MD on 4/04/2023 at 19:21                               Assessment & Plan        Assessment:  1. Epigastric pain    Plan:  · No anemia; occult blood negative  · Will proceed with EGD for further evaluation of epigastric pain  · Benefits vs risks of procedure d/w patient and daughter; risks include but are not limited to bleeding, infection, perforation, and risk of sedation  · Pt's daughter understands risks and agrees to proceed      I discussed the patients findings and my recommendations with patient, family and consulting provider.    Shaina Pastrana MD

## 2023-04-06 ENCOUNTER — TELEPHONE (OUTPATIENT)
Dept: FAMILY MEDICINE CLINIC | Facility: CLINIC | Age: 73
End: 2023-04-06

## 2023-04-06 ENCOUNTER — TRANSITIONAL CARE MANAGEMENT TELEPHONE ENCOUNTER (OUTPATIENT)
Dept: CALL CENTER | Facility: HOSPITAL | Age: 73
End: 2023-04-06
Payer: MEDICARE

## 2023-04-06 LAB
BACTERIA SPEC AEROBE CULT: ABNORMAL
CYTO UR: NORMAL
LAB AP CASE REPORT: NORMAL
LAB AP CLINICAL INFORMATION: NORMAL
LAB AP SPECIAL STAINS: NORMAL
PATH REPORT.FINAL DX SPEC: NORMAL
PATH REPORT.GROSS SPEC: NORMAL

## 2023-04-06 NOTE — THERAPY DISCHARGE NOTE
Acute Care - Occupational Therapy Discharge  [facility]    Patient Name: Bessy Leggett  : 1950    MRN: [id number]                              Today's Date: 2023       Admit Date: 2023    Visit Dx:     ICD-10-CM ICD-9-CM   1. Epigastric pain  R10.13 789.06   2. Decreased activities of daily living (ADL)  Z78.9 V49.89     Patient Active Problem List   Diagnosis   • AAA (abdominal aortic aneurysm) without rupture   • Hypertension   • Anxiety   • Asthma   • Cataract   • COPD (chronic obstructive pulmonary disease)   • Depression   • Non-insulin dependent type 2 diabetes mellitus   • Family history of blood clots   • GERD (gastroesophageal reflux disease)   • History of AAA (abdominal aortic aneurysm) repair   • History of seizure   • Hypercholesterolemia   • IBS (irritable bowel syndrome)   • Low back pain   • Melena   • Epigastric pain     Past Medical History:   Diagnosis Date   • Anxiety 2018    PATIENT REPORTS A LONG HISTORY OF ANXIETY ALONG IWTH AGOURA PHOBIA. SHE HAS BEEN PREVIOUSLY BEEN SEEN BY PSYCH RECENTLY., HER XANAX WAS STOPPED. I WILL REFER THE PATIENT FOR EVALUATION BY MENTAL HEALTH SPECIALIST   • Asthma    • Cataract    • COPD (chronic obstructive pulmonary disease)    • Depression    • GERD (gastroesophageal reflux disease)    • Hypertension    • IBS (irritable bowel syndrome)    • Low back pain      Past Surgical History:   Procedure Laterality Date   • ABDOMINAL SURGERY     • COLONOSCOPY     • GALLBLADDER SURGERY     • HYSTERECTOMY     • VASCULAR SURGERY        General Information    No documentation.                Mobility/ADL's    No documentation.                Obj/Interventions    No documentation.                Goals/Plan     Row Name 23 1131          Transfer Goal 1 (OT)    Activity/Assistive Device (Transfer Goal 1, OT) transfers, all;walker, rolling  -AV     [place] Level/Cues Needed (Transfer Goal 1, OT) contact guard required;minimum assist (75% or more  patient effort);verbal cues required  -AV     Time Frame (Transfer Goal 1, OT) long term goal (LTG);10 days  -AV     Progress/Outcome (Transfer Goal 1, OT) goal not met;discharged from [facility] 04/06/23 1131          Bathing Goal 1 (OT)    Activity/Device (Bathing Goal 1, OT) bathing skills, all;tub bench  -AV     [place] Level/Cues Needed (Bathing Goal 1, OT) modified independence;set-up required;verbal cues required  -AV     Time Frame (Bathing Goal 1, OT) long term goal (LTG);10 days  -AV     Progress/Outcomes (Bathing Goal 1, OT) goal not met;discharged from [facility] 04/06/23 1131          Dressing Goal 1 (OT)    Activity/Device (Dressing Goal 1, OT) dressing skills, all  -AV     [place]/Cues Needed (Dressing Goal 1, OT) moderate assist (50-74% patient effort);set-up required;verbal cues required  -AV     Time Frame (Dressing Goal 1, OT) long term goal (LTG);10 days  -AV     Progress/Outcome (Dressing Goal 1, OT) goal not met;discharged from [facility] 04/06/23 1131          Toileting Goal 1 (OT)    Activity/Device (Toileting Goal 1, OT) toileting skills, all;raised toilet seat  -AV     [place] Level/Cues Needed (Toileting Goal 1, OT) minimum assist (75% or more patient effort);set-up required;verbal cues required  -AV     Time Frame (Toileting Goal 1, OT) long term goal (LTG);10 days  -AV     Progress/Outcome (Toileting Goal 1, OT) goal not met;discharged from [facility] 04/06/23 1131          Grooming Goal 1 (OT)    Activity/Device (Grooming Goal 1, OT) grooming skills, all  -AV     [place] (Grooming Goal 1, OT) moderate assist (50-74% patient effort);set-up required;verbal cues required  -AV     Time Frame (Grooming Goal 1, OT) long term goal (LTG);10 days  -AV     Progress/Outcome (Grooming Goal 1, OT) goal not met;discharged from [facility] 04/06/23 1131          Strength Goal 1 (OT)    Strength Goal 1  (OT) Patient will demonstrate 4/5 bilateral triceps to increase transfer independence.  -AV     Time Frame (Strength Goal 1, OT) long term goal (LTG);10 days  -AV     Progress/Outcome (Strength Goal 1, OT) goal not met;discharged from facility  -AV     Row Name 04/06/23 1131          Problem Specific Goal 1 (OT)    Problem Specific Goal 1 (OT) Patient will demonstrate fair endurance/ activity tolerance needed to support ADLs.  -AV     Time Frame (Problem Specific Goal 1, OT) long term goal (LTG);10 days  -AV     Progress/Outcome (Problem Specific Goal 1, OT) goal not met;discharged from facility  -AV           User Key  (r) = Recorded By, (t) = Taken By, (c) = Cosigned By    Initials Name Provider Type    Yusuf Oswald OT Occupational Therapist               Clinical Impression    No documentation.                Outcome Measures    No documentation.               Occupational Therapy Education     Title: PT OT SLP Therapies (In Progress)     Topic: Occupational Therapy (In Progress)     Point: ADL training (Done)     Description:   Instruct learner(s) on proper safety adaptation and remediation techniques during self care or transfers.   Instruct in proper use of assistive devices.              Learning Progress Summary           Patient Acceptance, E, VU by AV at 4/5/2023 1044    Comment: daughter present   Family Acceptance, E, VU by AV at 4/5/2023 1044    Comment: daughter present                   Point: Home exercise program (Not Started)     Description:   Instruct learner(s) on appropriate technique for monitoring, assisting and/or progressing therapeutic exercises/activities.              Learner Progress:  Not documented in this visit.          Point: Precautions (Not Started)     Description:   Instruct learner(s) on prescribed precautions during self-care and functional transfers.              Learner Progress:  Not documented in this visit.          Point: Body mechanics (Not Started)      Description:   Instruct learner(s) on proper positioning and spine alignment during self-care, functional mobility activities and/or exercises.              Learner Progress:  Not documented in this visit.                      User Key     Initials Effective Dates Name Provider Type Discipline     06/16/21 -  Yusuf Hampton OT Occupational Therapist OT              OT Recommendation and Plan  Planned Therapy Interventions (OT): activity tolerance training, BADL retraining, functional balance retraining, occupation/activity based interventions, patient/caregiver education/training, transfer/mobility retraining, strengthening exercise  Therapy Frequency (OT): 5 times/wk  Plan of Care Review  Plan of Care Reviewed With: patient, daughter  Progress: no change (first session: evaluation)  Outcome Evaluation: Patient presents with limitations of balance, strength, cognition and endurance/ activity tolerance which are impacting ADL/transfer independence. Skilled OT is indicated to remediate/compensate for deficits to maximize independence with functional tasks.  Plan of Care Reviewed With: patient, daughter  Outcome Evaluation: Patient presents with limitations of balance, strength, cognition and endurance/ activity tolerance which are impacting ADL/transfer independence. Skilled OT is indicated to remediate/compensate for deficits to maximize independence with functional tasks.     Time Calculation:     Therapy Charges for Today     Code Description Service Date Service Provider Modifiers Qty    [id number] HC OT EVAL MOD COMPLEXITY 3 4/5/2023 Yusuf Hampton OT GO 1             OT Discharge Summary  Anticipated Discharge Disposition (OT): home with 24/7 care, home with home health, sub acute care setting  Reason for Discharge: Discharge from facility    Yusuf Hampton OT  4/6/2023

## 2023-04-06 NOTE — TELEPHONE ENCOUNTER
Caller: SKYLAR PHELPS    Relationship: Emergency Contact    Best call back number: [phone]    What is the best time to reach you: ANYTIME    Who are you requesting to speak with (clinical staff, provider,  specific staff member): CLINICAL    What was the call regarding: THE PATIENT'S DAUGHTER STATED THE PATIENT JUST CAME HOME FROM THE HOSPITAL. SHE IS NOW TESTING FOR POSTIVE FOR COVID WITH A SEVERE COUGH, FEVER/CHILLS, WHEEZING, CONGESTION. SHE ALSO HAS DEMENTIA AND A UTI. THE PATIENT'S DAUGHTER WOULD LIKE A CALL BACK TO CONFIRM IF PATIENT NEEDS TO GO BACK TO THE HOSPITAL    Do you require a callback: YES

## 2023-04-06 NOTE — TELEPHONE ENCOUNTER
Called and spoke with Pat.  She is going to take her back to the hospital.  She has many issues going and is just not herself at al.

## 2023-04-06 NOTE — OUTREACH NOTE
Call Center TCM Note    Flowsheet Row Responses   [facility] patient discharged from? [facility]   Does the patient have one of the following disease processes/diagnoses(primary or secondary)? Other   TCM attempt successful? Yes  [No verbal release, daughters involved in care per CM notes]   Call start time 0900   Call end time 0902   Discharge diagnosis [facility]   Person spoke with today (if not patient) and relationship Daughter   Meds reviewed with patient/caregiver? Yes   Is the patient having any side effects they believe may be caused by any medication additions or changes? No   Does the patient have all medications ordered at discharge? Yes   Is the patient taking all medications as directed (includes completed medication regime)? Yes   Comments HOSP DC FU appt 4/10/23 @ 8:15 am.    Does the patient have an appointment with their PCP within 7 days of discharge? Yes   Has home health visited the patient within 72 hours of discharge? N/A   Psychosocial issues? No   Did the patient receive a copy of their discharge instructions? Yes   Nursing interventions Reviewed instructions with patient   What is the patient's perception of their health status since discharge? Improving   Is the patient/caregiver able to teach back signs and symptoms related to disease process for when to call PCP? Yes   Is the patient/caregiver able to teach back signs and symptoms related to disease process for when to call 911? Yes   Is the patient/caregiver able to teach back the hierarchy of who to call/visit for symptoms/problems? PCP, Specialist, Home health nurse, Urgent Care, ED, 911 Yes   TCM call completed? Yes   Wrap up additional comments Daughter reports Pt is doing ok. Still has minor sore throat from scope. No needs.    Call end time 0902          Mar Macdonald RN    4/6/2023, 09:03 EDT

## 2023-04-09 LAB
BACTERIA SPEC AEROBE CULT: NORMAL
BACTERIA SPEC AEROBE CULT: NORMAL

## 2023-04-12 ENCOUNTER — PREP FOR SURGERY (OUTPATIENT)
Dept: OTHER | Facility: HOSPITAL | Age: 73
End: 2023-04-12
Payer: MEDICARE

## 2023-04-12 ENCOUNTER — TELEPHONE (OUTPATIENT)
Dept: GASTROENTEROLOGY | Facility: CLINIC | Age: 73
End: 2023-04-12
Payer: MEDICARE

## 2023-04-12 DIAGNOSIS — K31.A0 INTESTINAL METAPLASIA OF STOMACH: Primary | ICD-10-CM

## 2023-04-12 NOTE — TELEPHONE ENCOUNTER
----- Message from Shaina Pastrana MD sent at 4/10/2023  9:49 AM EDT -----  Biopsies with chronic inactive gastritis and intestinal metaplasia.  Given patient's recorded smoking history, should would potentially be at increased risk of gastric cancer.  Would recommend repeat EGD in 3 months to f/u intestinal metaplasia and perform additional biopsies if family agreeable.  Pt has demetia, so will need to coordinate with family.      If family does not want to proceed with EGD, then can schedule f/u with NP if they would like discuss further.  O/w, can call if they need anything.

## 2023-04-12 NOTE — TELEPHONE ENCOUNTER
Spoke to pt's daughter. Pat De Anda, informed of Dr. Pastrana's result note and recommendations. Verified understanding. Reports that they would like to proceed with EGD in three months.   States that they are in the works with an  from [place] to obtain legal paperwork regarding POA/Living Will.   Case Request entered for 2nd sign.

## 2023-04-14 PROBLEM — K31.A0 INTESTINAL METAPLASIA OF STOMACH: Status: ACTIVE | Noted: 2023-04-14

## 2023-04-14 NOTE — TELEPHONE ENCOUNTER
Spoke to pt's daughter, Pat Salcido. Educated on EGD. Reports that she has discussed with her sister that they would like to go to scheduled apt with neurology and after apt decide if they want to keep EGD apt. Verified understanding. Educated to contact for any issues or concerns.  Remind placed in Epic.

## 2023-04-16 ENCOUNTER — HOSPITAL ENCOUNTER (INPATIENT)
Facility: HOSPITAL | Age: 73
LOS: 10 days | Discharge: HOME HEALTH | End: 2023-04-26
Attending: EMERGENCY MEDICINE | Admitting: INTERNAL MEDICINE
Payer: MEDICARE

## 2023-04-16 ENCOUNTER — APPOINTMENT (OUTPATIENT)
Dept: GENERAL RADIOLOGY | Facility: HOSPITAL | Age: 73
End: 2023-04-16
Payer: MEDICARE

## 2023-04-16 ENCOUNTER — APPOINTMENT (OUTPATIENT)
Dept: CT IMAGING | Facility: HOSPITAL | Age: 73
End: 2023-04-16
Payer: MEDICARE

## 2023-04-16 DIAGNOSIS — Z78.9 DECREASED ACTIVITIES OF DAILY LIVING (ADL): ICD-10-CM

## 2023-04-16 DIAGNOSIS — J96.01 ACUTE RESPIRATORY FAILURE WITH HYPOXIA: ICD-10-CM

## 2023-04-16 DIAGNOSIS — R26.2 DIFFICULTY WALKING: ICD-10-CM

## 2023-04-16 DIAGNOSIS — T17.500A MUCUS PLUGGING OF BRONCHI: ICD-10-CM

## 2023-04-16 DIAGNOSIS — J44.1 COPD EXACERBATION: Primary | ICD-10-CM

## 2023-04-16 DIAGNOSIS — R13.12 OROPHARYNGEAL DYSPHAGIA: ICD-10-CM

## 2023-04-16 DIAGNOSIS — J18.9 PNEUMONIA DUE TO INFECTIOUS ORGANISM, UNSPECIFIED LATERALITY, UNSPECIFIED PART OF LUNG: ICD-10-CM

## 2023-04-16 LAB
ALBUMIN SERPL-MCNC: 4 G/DL (ref 3.5–5.2)
ALBUMIN/GLOB SERPL: 1.3 G/DL
ALP SERPL-CCNC: 72 U/L (ref 39–117)
ALT SERPL W P-5'-P-CCNC: 6 U/L (ref 1–33)
ANION GAP SERPL CALCULATED.3IONS-SCNC: 14.2 MMOL/L (ref 5–15)
AST SERPL-CCNC: 13 U/L (ref 1–32)
BASOPHILS # BLD AUTO: 0.05 10*3/MM3 (ref 0–0.2)
BASOPHILS NFR BLD AUTO: 0.4 % (ref 0–1.5)
BILIRUB SERPL-MCNC: 0.5 MG/DL (ref 0–1.2)
BUN SERPL-MCNC: 11 MG/DL (ref 8–23)
BUN/CREAT SERPL: 11.5 (ref 7–25)
CALCIUM SPEC-SCNC: 10 MG/DL (ref 8.6–10.5)
CHLORIDE SERPL-SCNC: 97 MMOL/L (ref 98–107)
CO2 SERPL-SCNC: 24.8 MMOL/L (ref 22–29)
CREAT SERPL-MCNC: 0.96 MG/DL (ref 0.57–1)
D-LACTATE SERPL-SCNC: 3.4 MMOL/L (ref 0.5–2)
D-LACTATE SERPL-SCNC: 5.1 MMOL/L (ref 0.5–2)
DEPRECATED RDW RBC AUTO: 41.7 FL (ref 37–54)
EGFRCR SERPLBLD CKD-EPI 2021: 63 ML/MIN/1.73
EOSINOPHIL # BLD AUTO: 0.15 10*3/MM3 (ref 0–0.4)
EOSINOPHIL NFR BLD AUTO: 1.2 % (ref 0.3–6.2)
ERYTHROCYTE [DISTWIDTH] IN BLOOD BY AUTOMATED COUNT: 12.5 % (ref 12.3–15.4)
GEN 5 2HR TROPONIN T REFLEX: 12 NG/L
GLOBULIN UR ELPH-MCNC: 3.2 GM/DL
GLUCOSE SERPL-MCNC: 131 MG/DL (ref 65–99)
HCT VFR BLD AUTO: 50 % (ref 34–46.6)
HGB BLD-MCNC: 17.1 G/DL (ref 12–15.9)
HOLD SPECIMEN: NORMAL
HOLD SPECIMEN: NORMAL
IMM GRANULOCYTES # BLD AUTO: 0.1 10*3/MM3 (ref 0–0.05)
IMM GRANULOCYTES NFR BLD AUTO: 0.8 % (ref 0–0.5)
INR PPP: 1.01 (ref 0.86–1.15)
LIPASE SERPL-CCNC: 18 U/L (ref 13–60)
LYMPHOCYTES # BLD AUTO: 5.26 10*3/MM3 (ref 0.7–3.1)
LYMPHOCYTES NFR BLD AUTO: 43 % (ref 19.6–45.3)
M PNEUMO IGM SER QL: NEGATIVE
MAGNESIUM SERPL-MCNC: 1.8 MG/DL (ref 1.6–2.4)
MCH RBC QN AUTO: 31 PG (ref 26.6–33)
MCHC RBC AUTO-ENTMCNC: 34.2 G/DL (ref 31.5–35.7)
MCV RBC AUTO: 90.6 FL (ref 79–97)
MONOCYTES # BLD AUTO: 0.98 10*3/MM3 (ref 0.1–0.9)
MONOCYTES NFR BLD AUTO: 8 % (ref 5–12)
NEUTROPHILS NFR BLD AUTO: 46.6 % (ref 42.7–76)
NEUTROPHILS NFR BLD AUTO: 5.7 10*3/MM3 (ref 1.7–7)
NRBC BLD AUTO-RTO: 0 /100 WBC (ref 0–0.2)
NT-PROBNP SERPL-MCNC: 1199 PG/ML (ref 0–900)
PLATELET # BLD AUTO: 292 10*3/MM3 (ref 140–450)
PMV BLD AUTO: 9.8 FL (ref 6–12)
POTASSIUM SERPL-SCNC: 3.5 MMOL/L (ref 3.5–5.2)
PROCALCITONIN SERPL-MCNC: 0.08 NG/ML (ref 0–0.25)
PROT SERPL-MCNC: 7.2 G/DL (ref 6–8.5)
PROTHROMBIN TIME: 13.4 SECONDS (ref 11.8–14.9)
QT INTERVAL: 362 MS
QT INTERVAL: 373 MS
RBC # BLD AUTO: 5.52 10*6/MM3 (ref 3.77–5.28)
SODIUM SERPL-SCNC: 136 MMOL/L (ref 136–145)
TROPONIN T DELTA: -6 NG/L
TROPONIN T SERPL HS-MCNC: 18 NG/L
WBC NRBC COR # BLD: 12.24 10*3/MM3 (ref 3.4–10.8)
WHOLE BLOOD HOLD COAG: NORMAL
WHOLE BLOOD HOLD SPECIMEN: NORMAL

## 2023-04-16 PROCEDURE — 71045 X-RAY EXAM CHEST 1 VIEW: CPT

## 2023-04-16 PROCEDURE — 71260 CT THORAX DX C+: CPT

## 2023-04-16 PROCEDURE — 94640 AIRWAY INHALATION TREATMENT: CPT

## 2023-04-16 PROCEDURE — 83605 ASSAY OF LACTIC ACID: CPT | Performed by: EMERGENCY MEDICINE

## 2023-04-16 PROCEDURE — 94799 UNLISTED PULMONARY SVC/PX: CPT

## 2023-04-16 PROCEDURE — 94664 DEMO&/EVAL PT USE INHALER: CPT

## 2023-04-16 PROCEDURE — 25010000002 VANCOMYCIN 5 G RECONSTITUTED SOLUTION: Performed by: STUDENT IN AN ORGANIZED HEALTH CARE EDUCATION/TRAINING PROGRAM

## 2023-04-16 PROCEDURE — 86738 MYCOPLASMA ANTIBODY: CPT | Performed by: STUDENT IN AN ORGANIZED HEALTH CARE EDUCATION/TRAINING PROGRAM

## 2023-04-16 PROCEDURE — 25010000002 METHYLPREDNISOLONE PER 125 MG: Performed by: EMERGENCY MEDICINE

## 2023-04-16 PROCEDURE — 25010000002 CEFEPIME PER 500 MG: Performed by: STUDENT IN AN ORGANIZED HEALTH CARE EDUCATION/TRAINING PROGRAM

## 2023-04-16 PROCEDURE — 93005 ELECTROCARDIOGRAM TRACING: CPT | Performed by: EMERGENCY MEDICINE

## 2023-04-16 PROCEDURE — 83880 ASSAY OF NATRIURETIC PEPTIDE: CPT | Performed by: EMERGENCY MEDICINE

## 2023-04-16 PROCEDURE — 36415 COLL VENOUS BLD VENIPUNCTURE: CPT

## 2023-04-16 PROCEDURE — 93010 ELECTROCARDIOGRAM REPORT: CPT | Performed by: INTERNAL MEDICINE

## 2023-04-16 PROCEDURE — 85610 PROTHROMBIN TIME: CPT | Performed by: STUDENT IN AN ORGANIZED HEALTH CARE EDUCATION/TRAINING PROGRAM

## 2023-04-16 PROCEDURE — 87040 BLOOD CULTURE FOR BACTERIA: CPT | Performed by: EMERGENCY MEDICINE

## 2023-04-16 PROCEDURE — 36415 COLL VENOUS BLD VENIPUNCTURE: CPT | Performed by: EMERGENCY MEDICINE

## 2023-04-16 PROCEDURE — 87641 MR-STAPH DNA AMP PROBE: CPT | Performed by: STUDENT IN AN ORGANIZED HEALTH CARE EDUCATION/TRAINING PROGRAM

## 2023-04-16 PROCEDURE — 83690 ASSAY OF LIPASE: CPT | Performed by: EMERGENCY MEDICINE

## 2023-04-16 PROCEDURE — 93005 ELECTROCARDIOGRAM TRACING: CPT

## 2023-04-16 PROCEDURE — 25010000002 FENTANYL CITRATE (PF) 50 MCG/ML SOLUTION: Performed by: EMERGENCY MEDICINE

## 2023-04-16 PROCEDURE — 99223 1ST HOSP IP/OBS HIGH 75: CPT | Performed by: STUDENT IN AN ORGANIZED HEALTH CARE EDUCATION/TRAINING PROGRAM

## 2023-04-16 PROCEDURE — 84145 PROCALCITONIN (PCT): CPT | Performed by: STUDENT IN AN ORGANIZED HEALTH CARE EDUCATION/TRAINING PROGRAM

## 2023-04-16 PROCEDURE — 94761 N-INVAS EAR/PLS OXIMETRY MLT: CPT

## 2023-04-16 PROCEDURE — 80053 COMPREHEN METABOLIC PANEL: CPT | Performed by: EMERGENCY MEDICINE

## 2023-04-16 PROCEDURE — 83735 ASSAY OF MAGNESIUM: CPT | Performed by: EMERGENCY MEDICINE

## 2023-04-16 PROCEDURE — 85025 COMPLETE CBC W/AUTO DIFF WBC: CPT

## 2023-04-16 PROCEDURE — 99285 EMERGENCY DEPT VISIT HI MDM: CPT

## 2023-04-16 PROCEDURE — 25010000002 ENOXAPARIN PER 10 MG: Performed by: STUDENT IN AN ORGANIZED HEALTH CARE EDUCATION/TRAINING PROGRAM

## 2023-04-16 PROCEDURE — 25510000001 IOPAMIDOL PER 1 ML: Performed by: EMERGENCY MEDICINE

## 2023-04-16 PROCEDURE — 84484 ASSAY OF TROPONIN QUANT: CPT | Performed by: EMERGENCY MEDICINE

## 2023-04-16 PROCEDURE — 25010000002 CEFTRIAXONE PER 250 MG: Performed by: EMERGENCY MEDICINE

## 2023-04-16 RX ORDER — PANTOPRAZOLE SODIUM 40 MG/1
40 TABLET, DELAYED RELEASE ORAL
Status: DISCONTINUED | OUTPATIENT
Start: 2023-04-17 | End: 2023-04-22

## 2023-04-16 RX ORDER — IPRATROPIUM BROMIDE AND ALBUTEROL SULFATE 2.5; .5 MG/3ML; MG/3ML
3 SOLUTION RESPIRATORY (INHALATION) ONCE
Status: COMPLETED | OUTPATIENT
Start: 2023-04-16 | End: 2023-04-16

## 2023-04-16 RX ORDER — SODIUM CHLORIDE 0.9 % (FLUSH) 0.9 %
10 SYRINGE (ML) INJECTION EVERY 12 HOURS SCHEDULED
Status: DISCONTINUED | OUTPATIENT
Start: 2023-04-16 | End: 2023-04-26 | Stop reason: HOSPADM

## 2023-04-16 RX ORDER — ENOXAPARIN SODIUM 100 MG/ML
40 INJECTION SUBCUTANEOUS DAILY
Status: DISCONTINUED | OUTPATIENT
Start: 2023-04-16 | End: 2023-04-17

## 2023-04-16 RX ORDER — METHYLPREDNISOLONE SODIUM SUCCINATE 40 MG/ML
40 INJECTION, POWDER, LYOPHILIZED, FOR SOLUTION INTRAMUSCULAR; INTRAVENOUS EVERY 24 HOURS
Status: DISCONTINUED | OUTPATIENT
Start: 2023-04-17 | End: 2023-04-18

## 2023-04-16 RX ORDER — SODIUM CHLORIDE, SODIUM LACTATE, POTASSIUM CHLORIDE, CALCIUM CHLORIDE 600; 310; 30; 20 MG/100ML; MG/100ML; MG/100ML; MG/100ML
100 INJECTION, SOLUTION INTRAVENOUS CONTINUOUS
Status: DISCONTINUED | OUTPATIENT
Start: 2023-04-16 | End: 2023-04-16

## 2023-04-16 RX ORDER — ASPIRIN 81 MG/1
324 TABLET, CHEWABLE ORAL ONCE
Status: DISCONTINUED | OUTPATIENT
Start: 2023-04-16 | End: 2023-04-23

## 2023-04-16 RX ORDER — CHOLECALCIFEROL (VITAMIN D3) 125 MCG
5 CAPSULE ORAL NIGHTLY PRN
Status: DISCONTINUED | OUTPATIENT
Start: 2023-04-16 | End: 2023-04-22

## 2023-04-16 RX ORDER — CEFDINIR 300 MG/1
300 CAPSULE ORAL 2 TIMES DAILY
COMMUNITY
Start: 2023-04-05 | End: 2023-04-08

## 2023-04-16 RX ORDER — ALUMINA, MAGNESIA, AND SIMETHICONE 2400; 2400; 240 MG/30ML; MG/30ML; MG/30ML
15 SUSPENSION ORAL EVERY 6 HOURS PRN
Status: DISCONTINUED | OUTPATIENT
Start: 2023-04-16 | End: 2023-04-22

## 2023-04-16 RX ORDER — ASPIRIN 325 MG
325 TABLET ORAL AS NEEDED
COMMUNITY
End: 2023-04-26 | Stop reason: HOSPADM

## 2023-04-16 RX ORDER — FENTANYL CITRATE 50 UG/ML
50 INJECTION, SOLUTION INTRAMUSCULAR; INTRAVENOUS ONCE
Status: COMPLETED | OUTPATIENT
Start: 2023-04-16 | End: 2023-04-16

## 2023-04-16 RX ORDER — ACETAMINOPHEN 325 MG/1
650 TABLET ORAL EVERY 6 HOURS PRN
Status: DISCONTINUED | OUTPATIENT
Start: 2023-04-16 | End: 2023-04-22

## 2023-04-16 RX ORDER — METHYLPREDNISOLONE SODIUM SUCCINATE 125 MG/2ML
125 INJECTION, POWDER, LYOPHILIZED, FOR SOLUTION INTRAMUSCULAR; INTRAVENOUS ONCE
Status: COMPLETED | OUTPATIENT
Start: 2023-04-16 | End: 2023-04-16

## 2023-04-16 RX ORDER — SODIUM CHLORIDE 0.9 % (FLUSH) 0.9 %
10 SYRINGE (ML) INJECTION AS NEEDED
Status: DISCONTINUED | OUTPATIENT
Start: 2023-04-16 | End: 2023-04-26 | Stop reason: HOSPADM

## 2023-04-16 RX ORDER — CEFTRIAXONE SODIUM 1 G/50ML
1 INJECTION, SOLUTION INTRAVENOUS ONCE
Status: COMPLETED | OUTPATIENT
Start: 2023-04-16 | End: 2023-04-16

## 2023-04-16 RX ORDER — LISINOPRIL 20 MG/1
20 TABLET ORAL DAILY
Status: DISCONTINUED | OUTPATIENT
Start: 2023-04-17 | End: 2023-04-22

## 2023-04-16 RX ORDER — CITALOPRAM 20 MG/1
20 TABLET ORAL EVERY MORNING
Status: DISCONTINUED | OUTPATIENT
Start: 2023-04-17 | End: 2023-04-22

## 2023-04-16 RX ORDER — PRAVASTATIN SODIUM 40 MG
40 TABLET ORAL NIGHTLY
Status: DISCONTINUED | OUTPATIENT
Start: 2023-04-17 | End: 2023-04-22

## 2023-04-16 RX ORDER — SODIUM CHLORIDE 9 MG/ML
40 INJECTION, SOLUTION INTRAVENOUS AS NEEDED
Status: DISCONTINUED | OUTPATIENT
Start: 2023-04-16 | End: 2023-04-26 | Stop reason: HOSPADM

## 2023-04-16 RX ORDER — IPRATROPIUM BROMIDE AND ALBUTEROL SULFATE 2.5; .5 MG/3ML; MG/3ML
3 SOLUTION RESPIRATORY (INHALATION)
Status: DISCONTINUED | OUTPATIENT
Start: 2023-04-16 | End: 2023-04-17

## 2023-04-16 RX ORDER — NITROGLYCERIN 0.4 MG/1
0.4 TABLET SUBLINGUAL
Status: DISCONTINUED | OUTPATIENT
Start: 2023-04-16 | End: 2023-04-26 | Stop reason: HOSPADM

## 2023-04-16 RX ORDER — SODIUM CHLORIDE 9 MG/ML
100 INJECTION, SOLUTION INTRAVENOUS CONTINUOUS
Status: DISCONTINUED | OUTPATIENT
Start: 2023-04-16 | End: 2023-04-17

## 2023-04-16 RX ADMIN — IPRATROPIUM BROMIDE AND ALBUTEROL SULFATE 3 ML: 2.5; .5 SOLUTION RESPIRATORY (INHALATION) at 16:34

## 2023-04-16 RX ADMIN — IPRATROPIUM BROMIDE AND ALBUTEROL SULFATE 3 ML: 2.5; .5 SOLUTION RESPIRATORY (INHALATION) at 14:02

## 2023-04-16 RX ADMIN — SODIUM CHLORIDE 500 ML: 9 INJECTION, SOLUTION INTRAVENOUS at 22:35

## 2023-04-16 RX ADMIN — ENOXAPARIN SODIUM 40 MG: 100 INJECTION SUBCUTANEOUS at 22:41

## 2023-04-16 RX ADMIN — FENTANYL CITRATE 50 MCG: 50 INJECTION INTRAMUSCULAR; INTRAVENOUS at 16:39

## 2023-04-16 RX ADMIN — Medication 10 ML: at 22:51

## 2023-04-16 RX ADMIN — CEFEPIME 2 G: 2 INJECTION, POWDER, FOR SOLUTION INTRAVENOUS at 21:54

## 2023-04-16 RX ADMIN — METHYLPREDNISOLONE SODIUM SUCCINATE 125 MG: 125 INJECTION INTRAMUSCULAR; INTRAVENOUS at 16:40

## 2023-04-16 RX ADMIN — SODIUM CHLORIDE 100 ML/HR: 9 INJECTION, SOLUTION INTRAVENOUS at 23:56

## 2023-04-16 RX ADMIN — DOXYCYCLINE 100 MG: 100 INJECTION, POWDER, LYOPHILIZED, FOR SOLUTION INTRAVENOUS at 19:34

## 2023-04-16 RX ADMIN — IOPAMIDOL 100 ML: 755 INJECTION, SOLUTION INTRAVENOUS at 16:01

## 2023-04-16 RX ADMIN — SODIUM CHLORIDE, POTASSIUM CHLORIDE, SODIUM LACTATE AND CALCIUM CHLORIDE 1000 ML: 600; 310; 30; 20 INJECTION, SOLUTION INTRAVENOUS at 19:35

## 2023-04-16 RX ADMIN — IPRATROPIUM BROMIDE AND ALBUTEROL SULFATE 3 ML: 2.5; .5 SOLUTION RESPIRATORY (INHALATION) at 23:47

## 2023-04-16 RX ADMIN — CEFTRIAXONE SODIUM 1 G: 1 INJECTION, SOLUTION INTRAVENOUS at 18:47

## 2023-04-16 RX ADMIN — SODIUM CHLORIDE, POTASSIUM CHLORIDE, SODIUM LACTATE AND CALCIUM CHLORIDE 100 ML/HR: 600; 310; 30; 20 INJECTION, SOLUTION INTRAVENOUS at 21:55

## 2023-04-16 RX ADMIN — VANCOMYCIN HYDROCHLORIDE 1000 MG: 5 INJECTION, POWDER, LYOPHILIZED, FOR SOLUTION INTRAVENOUS at 22:34

## 2023-04-16 NOTE — H&P
[facility]   Patient Name: Bessy Leggett  : 1950  MRN: [id number]  Primary Care Physician:  Erich Joya DO  Date of admission: 2023    Subjective   Subjective     Chief Complaint: Shortness of breath, chest pain    HPI:    Bessy Leggett is a 73 y.o. female past medical history of depression/anxiety, COPD not on home oxygen, GERD, hypertension, IBS, chronic low back pain, dementia who presents to the ER due to 4-day history of worsening mental status, cough and shortness of breath.  Patient is a poor historian and unable to provide clear details about her history.  History supplemented by discussion with daughter at the bedside as well as discussion with ER staff.  Chart review shows patient was recently hospitalized here 2 weeks ago for GI bleed and COVID. EGD at that time showed normal esophagus with a small hiatal hernia and diffuse inflammation characterized by erythema and granularity in the antrum where biopsies were taken.  Patient at that time also had a UTI that was treated with antibiotics.  She was discharged home in stable condition however since being home over the last 4 days her daughter noticed her to be more lethargic with minimal activity.  In addition she has had intermittent spells of \"shaking\" that do not seem to be seizure type activity according to the daughter.  Patient has also been complaining of a pleuritic chest pain in the left chest around her breast as well as some dyspnea.  This has been associated with a productive cough over the last 2 days.  Her daughter has also noted oxygen saturations in the 80s at home for which she has been using another family member's oxygen to treat her.  Ultimately her dyspnea progressed significantly today and the daughter decided to bring her into the ER for evaluation    Upon arrival here patient was tachycardic to 114 and tachypneic to 35.  She was hypoxic on room air.  Lab work " revealed a mildly elevated proBNP of 1199, leukocytosis of 12,000, elevated hemoglobin of 17.  CTA was personally reviewed and did not reveal evidence of a pulmonary embolism but did reveal left pleural effusion as well as consolidation of the left lower lobe as well as marked emphysematous changes.EKG personally reviewed shows sinus tachycardia, borderline prolonged OH interval and prolonged QTc with poor progression anteriorly, PACs and biatrial enlargement.  Blood cultures were drawn and patient started on Rocephin and doxycycline for suspected pneumonia.  Hospitalist service was then contacted for admission.      Personal History     Past Medical History:  Past Medical History:   Diagnosis Date   • Anxiety 05/23/2018    PATIENT REPORTS A LONG HISTORY OF ANXIETY ALONG IWTH AGOURA PHOBIA. SHE HAS BEEN PREVIOUSLY BEEN SEEN BY PSYCH RECENTLY., HER XANAX WAS STOPPED. I WILL REFER THE PATIENT FOR EVALUATION BY MENTAL HEALTH SPECIALIST   • Asthma    • Cataract    • COPD (chronic obstructive pulmonary disease)    • Depression    • GERD (gastroesophageal reflux disease)    • Hypertension    • IBS (irritable bowel syndrome)    • Low back pain          Past Surgical History:  Past Surgical History:   Procedure Laterality Date   • ABDOMINAL SURGERY     • COLONOSCOPY     • ENDOSCOPY N/A 4/5/2023    Procedure: ESOPHAGOGASTRODUODENOSCOPY WITH BIOPSIES;  Surgeon: Shaina Pastrana MD;  Location: [facility] END[facility];  Service: Gastroenterology;  Laterality: N/A;  GASTRITIS  HIATAL HERNIA   • GALLBLADDER SURGERY     • HYSTERECTOMY     • VASCULAR SURGERY           Family History:   Reviewed and noncontributory except as mentioned in HPI    Social History:   Social Determinants of Health     Tobacco Use: High Risk   • Smoking Tobacco Use: Every Day   • Smokeless Tobacco Use: Never   • Passive Exposure: Not on file   Alcohol Use: Not At Risk   • Frequency of Alcohol Consumption: Never   • Average Number of Drinks: Patient does  not drink   • Frequency of Binge Drinking: Never   Financial Resource Strain: Not on file   Food Insecurity: Not on file   Transportation Needs: Not on file   Physical Activity: Not on file   Stress: Not on file   Social Connections: Not on file   Intimate Partner Violence: Not on file   Depression: At risk   • PHQ-2 Score: 16   Housing Stability: Not on file         Home Medications:  Umeclidinium-Vilanterol, albuterol sulfate HFA, citalopram, lisinopril, omeprazole, and pravastatin    Allergies:  Allergies   Allergen Reactions   • Penicillins Itching, Rash, Shortness Of Breath and Swelling   • Buspirone Unknown - High Severity   • Ibuprofen Irritability   • Rofecoxib Unknown - Low Severity   • Sumatriptan Unknown - High Severity   • Tramadol Unknown - High Severity   • Tylenol With Codeine #3 [Acetaminophen-Codeine] Unknown - High Severity       Review of Systems   All systems were reviewed and negative except for: Chest pain, dyspnea, chills    Objective   Objective     Vitals:   Temp:  [98.1 °F (36.7 °C)] 98.1 °F (36.7 °C)  Heart Rate:  [] 95  Resp:  [24-36] 24  BP: (121-138)/(85-94) 138/85  Flow (L/min):  [2-3] 2    Physical Exam    Constitutional: Awake, alert, no acute distress   Eyes: Pupils equal, sclerae anicteric, no conjunctival injection   HENT: NCAT, mucous membranes moist   Neck: Supple, no thyromegaly, no lymphadenopathy, trachea midline   Respiratory: Clear to auscultation bilaterally, nonlabored respirations    Cardiovascular: RRR, no murmurs, rubs, or gallops, palpable pedal pulses bilaterally   Gastrointestinal: Positive bowel sounds, soft, nontender, nondistended   Musculoskeletal: No bilateral ankle edema, no clubbing or cyanosis to extremities   Psychiatric: Appropriate affect, cooperative   Neurologic: Oriented x 2 (person, place), tremulous appearing,, strength symmetric in all extremities, Cranial Nerves grossly intact to confrontation, speech clear   Skin: No rashes     Result Review     Result Review:  I have personally reviewed the results from the time of this admission to 4/16/2023 18:46 EDT and agree with these findings:  [x]  Laboratory  [x]  Microbiology  [x]  Radiology  [x]  EKG/Telemetry   [x]  Cardiology/Vascular   []  Pathology  [x]  Old records  []  Other:      Assessment & Plan   Assessment / Plan     Assessment/Plan:   • Acute hypoxic respiratory failure secondary to HCAP and COPD exacerbation  • Sepsis 2/2 Healthcare associated pneumonia  • Acute metabolic encephalopathy secondary to above  • Lactic acidosis likely secondary to sepsis above  • Prolonged QTc  • Elevated troponin likely type II MI secondary to demand ischemia from above  • Polycythemia, likely secondary to chronic hypoxia  • Suspected dementia  • Infrarenal abdominal aortic aneurysm, 5.6 cm status post prior graft repair  • COPD    Plan  - Admit to telemetry on the hospitalist service  - We will continue broad-spectrum antibiotics with vancomycin and cefepime, MRSA nares ordered to help de-escalate therapy.  Sputum cultures and bronchopulmonary hygiene ordered  - Patient does seem to meet criteria for sepsis with leukocytosis, tachycardia, lactic acidosis and tachypnea.  Did not receive any fluids in the ER and we will give a liter fluid bolus given elevated BNP and assess for improvement in lactic acid.  Broad-spectrum antibiotics as above.  Follow-up blood cultures  - Continue scheduled nebulizers, will start Solu-Medrol 40 mg twice daily for COPD exacerbation  - Continuous pulse ox and telemetry ordered for closer monitoring of patient.  - Procalcitonin pending, follow-up findings. Strep pneumo, legionella, and mycoplasma testing ordered  - Follow-up blood cultures.  Continue to review labs daily  - Avoid QTc prolonging medications, repeat high-sensitivity troponin in a.m.  - Patient has outpatient follow-up with neurology later this month for suspected dementia, advised to keep appointment  - Advised vascular  surgery follow-up for known infrarenal AAA  - All labs, imaging and EKG personally reviewed, findings as described above          Discussed with ER Physician and Nurse      DVT Prophylaxis: Lovenox    CODE STATUS:    Level Of Support Discussed With: Health Care Surrogate; Next of Kin (If No Surrogate)  Code Status (Patient has no pulse and is not breathing): CPR (Attempt to Resuscitate)  Medical Interventions (Patient has pulse or is breathing): Full Support      Admission Status:  I believe this patient meets inpatient status.    Electronically signed by Wander Jane MD, 04/16/23, 6:46 PM EDT.

## 2023-04-16 NOTE — ED PROVIDER NOTES
Time: 2:06 PM EDT  Date of encounter:  4/16/2023  Independent Historian/Clinical History and Information was obtained by: Patient, Chart and EMS  Chief Complaint: chest pain  History is limited by: cognitive impairment with memory deficit  Room number: 18/18     History of Present Illness:  HPI     Patient is a 73 y.o. year old female who presents to the Emergency Department via EMS for evaluation of chest pain. Patient has no one at bedside on initial evaluation. Patient has a medical history of abdominal aortic aneurysm (AAA) requiring surgical repair, anxiety, chronic obstructive pulmonary disease (COPD) with asthma, depression, gastroesophageal reflux disease (GERD), hyperlipidemia (HLD), hypertension (HTN), irritable bowel syndrome (IBS), seizure disorder and type 2 diabetes mellitus (T2DM). Patient has a surgical history of abdominal surgery, cholecystectomy and hysterectomy. Patient has a social history of current smoker.    Per EMS, patients blood pressure was elevated at 160/100mmHg initially. EMS also administered four 81mg Aspirin tablets, 3 nitro pills, and 125 sodium medrol and blood pressure was 116/90mmHg PTA.    Patient started experiencing symptoms of chest pain with shortness of breath symptoms that started approximately a couple hours ago. Patient rates their pain 10 out of 10 at rest and with movement or activity. Patient denies symptoms of fever, chills, congestion, ear, pain, sore throat, eye pain, chest tightness, cough, abdominal pain, nausea, vomiting, diarrhea, flank pain, hematuria, joint swelling, pallor, headaches, seizures. All other systems reviews are negative.    Patient is recovering from COVID-19 from a couple weeks ago but was improving until recently. Patient is not oxygen dependent at home but had SPO2 of 85% on room air earlier today (04/16/2023).     Patient Care Team  Primary Care Provider: Erich Joya DO    Past Medical History:  Allergies   Allergen Reactions   •  Penicillins Itching, Rash, Shortness Of Breath and Swelling   • Buspirone Unknown - High Severity   • Ibuprofen Irritability   • Rofecoxib Unknown - Low Severity   • Sumatriptan Unknown - High Severity   • Tramadol Unknown - High Severity   • Tylenol With Codeine #3 [Acetaminophen-Codeine] Unknown - High Severity     Past Medical History:   Diagnosis Date   • Anxiety 05/23/2018    PATIENT REPORTS A LONG HISTORY OF ANXIETY ALONG IWTH AGOURA PHOBIA. SHE HAS BEEN PREVIOUSLY BEEN SEEN BY PSYCH RECENTLY., HER XANAX WAS STOPPED. I WILL REFER THE PATIENT FOR EVALUATION BY MENTAL HEALTH SPECIALIST   • Asthma    • Cataract    • COPD (chronic obstructive pulmonary disease)    • Depression    • GERD (gastroesophageal reflux disease)    • Hypertension    • IBS (irritable bowel syndrome)    • Low back pain      Past Surgical History:   Procedure Laterality Date   • ABDOMINAL SURGERY     • COLONOSCOPY     • ENDOSCOPY N/A 4/5/2023    Procedure: ESOPHAGOGASTRODUODENOSCOPY WITH BIOPSIES;  Surgeon: Shaina Pastrana MD;  Location: [facility] ENDOSCOPY;  Service: Gastroenterology;  Laterality: N/A;  GASTRITIS  HIATAL HERNIA   • GALLBLADDER SURGERY     • HYSTERECTOMY     • VASCULAR SURGERY       Family History   Problem Relation Age of Onset   • Stroke Other    • Heart disease Other    • Hypertension Other    • Cancer Other    • Diabetes Other        Home Medications:  Prior to Admission medications    Medication Sig Start Date End Date Taking? Authorizing Provider   albuterol sulfate  (90 Base) MCG/ACT inhaler Inhale 2 puffs Every 4 (Four) Hours As Needed for Wheezing. 2/10/23   Erich Joya, DO   citalopram (CeleXA) 20 MG tablet Take 1 tablet by mouth Every Morning for 90 days. 1/4/23 4/4/23  Erich Joya, DO   lisinopril (PRINIVIL,ZESTRIL) 20 MG tablet Take 1 tablet by mouth Daily. 1/4/23   Shawn Joyaol, DO   omeprazole (priLOSEC) 40 MG capsule Take 1 capsule by mouth Daily for 30 days. 4/5/23 5/5/23  Ronnie  "Jermaine CUMMINS MD   pravastatin (PRAVACHOL) 40 MG tablet Take 1 tablet by mouth Daily. 1/4/23   Erich Joya DO   Umeclidinium-Vilanterol (Anoro Ellipta) 62.5-25 MCG/ACT aerosol powder  inhaler Inhale 1 puff Daily. 1/4/23   Erich Joya DO        Social History:  Social History     Tobacco Use   • Smoking status: Every Day   • Smokeless tobacco: Never   Vaping Use   • Vaping Use: Unknown   Substance Use Topics   • Alcohol use: Never   • Drug use: Never       Review of Systems:   Review of Systems   Constitutional: Negative for chills and fever.   HENT: Negative for congestion, ear pain and sore throat.    Eyes: Negative for pain.   Respiratory: Positive for shortness of breath. Negative for cough and chest tightness.    Cardiovascular: Positive for chest pain.   Gastrointestinal: Negative for abdominal pain, diarrhea, nausea and vomiting.   Endocrine: Negative.    Genitourinary: Negative for flank pain and hematuria.   Musculoskeletal: Negative for joint swelling.   Skin: Negative for pallor.   Allergic/Immunologic: Negative.    Neurological: Negative for seizures and headaches.   Hematological: Negative.    Psychiatric/Behavioral: Negative.    All other systems reviewed and are negative.         Physical Exam:   /85   Pulse 95   Temp 98.1 °F (36.7 °C) (Oral)   Resp 24   Ht 157.5 cm (62\")   Wt 49.4 kg (108 lb 14.5 oz)   SpO2 95%   BMI 19.92 kg/m²     Physical Exam  Vitals and nursing note reviewed.   Constitutional:       General: She is in acute distress (moderate).   HENT:      Head: Normocephalic and atraumatic.      Right Ear: External ear normal.      Left Ear: External ear normal.      Nose: Nose normal.      Mouth/Throat:      Mouth: Mucous membranes are moist.   Eyes:      Extraocular Movements: Extraocular movements intact.      Conjunctiva/sclera: Conjunctivae normal.      Pupils: Pupils are equal, round, and reactive to light.   Cardiovascular:      Rate and Rhythm: Regular rhythm. " Tachycardia present.      Pulses: Normal pulses.      Heart sounds: Normal heart sounds.   Pulmonary:      Effort: Respiratory distress (moderate) present.      Breath sounds: Wheezing (bilateral) and rhonchi (bilateral) present. No decreased breath sounds or rales.   Abdominal:      General: Bowel sounds are normal. There is no distension.      Palpations: Abdomen is soft.      Tenderness: There is no abdominal tenderness. There is no guarding or rebound.   Musculoskeletal:         General: Normal range of motion.      Cervical back: Neck supple.   Skin:     General: Skin is warm.      Findings: No rash.   Neurological:      Mental Status: She is alert and oriented to person, place, and time.   Psychiatric:         Mood and Affect: Mood normal.         Behavior: Behavior normal.                Procedures:  ECG 12 Lead ED Triage Standing Order; Chest Pain      Date/Time: 4/16/2023 3:15 PM  Performed by: Jarek Colindres DO  Authorized by: Jarek Colindres DO   Interpreted by physician  Comparison: not compared with previous ECG   Comments: I interpreted findings of this EKG study at 3:15pm    EKG Findings: pace rhythm with tachycardia 108 BPM right axis deviation, nonspecific ST-segments; normal QT-QTc;     Comparison: none          Medical Decision Making:    Comorbidities that affect care:    anxiety, chronic obstructive pulmonary disease (COPD) with asthma, depression, gastroesophageal reflux disease (GERD), hypertension (HTN) and irritable bowel syndrome (IBS), current smoker    External Notes reviewed:    None    The following orders were placed and all results were independently analyzed by me:  Orders Placed This Encounter   Procedures   • Blood Culture - Blood,   • Blood Culture - Blood,   • XR Chest 1 View   • CT Chest With Contrast Diagnostic   • [place] Draw   • High Sensitivity Troponin T   • Comprehensive Metabolic Panel   • Lipase   • BNP   • Magnesium   • CBC Auto Differential   • High Sensitivity  Troponin T 2Hr   • Lactic Acid, Plasma   • Procalcitonin   • NPO Diet NPO Type: Strict NPO   • Undress & Gown   • Cardiac Monitoring   • Continuous Pulse Oximetry   • Hospitalist (on-call MD unless specified)   • Oxygen Therapy- Nasal Cannula; 2 LPM; Titrate for SPO2: equal to or greater than, 92%   • ECG 12 Lead ED Triage Standing Order; Chest Pain   • ECG 12 Lead ED Triage Standing Order; Chest Pain   • Insert Peripheral IV   • CBC & Differential   • Green Top (Gel)   • Lavender Top   • Gold Top - SST   • Light Blue Top       Medications Given in the Emergency Department:  Medications   sodium chloride 0.9 % flush 10 mL (has no administration in time range)   aspirin chewable tablet 324 mg (324 mg Oral Not Given 4/16/23 1346)   cefTRIAXone (ROCEPHIN) IVPB 1 g (has no administration in time range)   doxycycline (VIBRAMYCIN) 100 mg/100 mL 0.9% NS MBP (has no administration in time range)   ipratropium-albuterol (DUO-NEB) nebulizer solution 3 mL (3 mL Nebulization Given 4/16/23 1402)   methylPREDNISolone sodium succinate (SOLU-Medrol) injection 125 mg (125 mg Intravenous Given 4/16/23 1640)   fentaNYL citrate (PF) (SUBLIMAZE) injection 50 mcg (50 mcg Intravenous Given 4/16/23 1639)   ipratropium-albuterol (DUO-NEB) nebulizer solution 3 mL (3 mL Nebulization Given 4/16/23 1634)   iopamidol (ISOVUE-370) 76 % injection 100 mL (100 mL Intravenous Given 4/16/23 1601)       ED Course:       Labs:  Lab Results (last 24 hours)     Procedure Component Value Units Date/Time    High Sensitivity Troponin T [[id number]]  (Abnormal) Collected: 04/16/23 1338    Specimen: Blood Updated: 04/16/23 1414     HS Troponin T 18 ng/L     Narrative:      High Sensitive Troponin T Reference Range:  <10.0 ng/L- Negative Female for AMI  <15.0 ng/L- Negative Male for AMI  >=10 - Abnormal Female indicating possible myocardial injury.  >=15 - Abnormal Male indicating possible myocardial injury.   Clinicians would have to utilize clinical acumen,  EKG, Troponin, and serial changes to determine if it is an Acute Myocardial Infarction or myocardial injury due to an underlying chronic condition.         CBC & Differential [[id number]]  (Abnormal) Collected: 04/16/23 1338    Specimen: Blood Updated: 04/16/23 1349    Narrative:      The following orders were created for panel order CBC & Differential.  Procedure                               Abnormality         Status                     ---------                               -----------         ------                     CBC Auto Differential[[id number]]        Abnormal            Final result                 Please view results for these tests on the individual orders.    Comprehensive Metabolic Panel [[id number]]  (Abnormal) Collected: 04/16/23 1338    Specimen: Blood Updated: 04/16/23 1414     Glucose 131 mg/dL      BUN 11 mg/dL      Creatinine 0.96 mg/dL      Sodium 136 mmol/L      Potassium 3.5 mmol/L      Chloride 97 mmol/L      CO2 24.8 mmol/L      Calcium 10.0 mg/dL      Total Protein 7.2 g/dL      Albumin 4.0 g/dL      ALT (SGPT) 6 U/L      AST (SGOT) 13 U/L      Alkaline Phosphatase 72 U/L      Total Bilirubin 0.5 mg/dL      Globulin 3.2 gm/dL      A/G Ratio 1.3 g/dL      BUN/Creatinine Ratio 11.5     Anion Gap 14.2 mmol/L      eGFR 63.0 mL/min/1.73     Narrative:      GFR Normal >60  Chronic Kidney Disease <60  Kidney Failure <15    The GFR formula is only valid for adults with stable renal function between ages 18 and 70.    Lipase [[id number]]  (Normal) Collected: 04/16/23 1338    Specimen: Blood Updated: 04/16/23 1414     Lipase 18 U/L     BNP [[id number]]  (Abnormal) Collected: 04/16/23 1338    Specimen: Blood Updated: 04/16/23 1410     proBNP 1,199.0 pg/mL     Narrative:      Among patients with dyspnea, NT-proBNP is highly sensitive for the detection of acute congestive heart failure. In addition NT-proBNP of <300 pg/ml effectively rules out acute congestive heart failure with 99% negative predictive  value.      Magnesium [[id number]]  (Normal) Collected: 04/16/23 1338    Specimen: Blood Updated: 04/16/23 1414     Magnesium 1.8 mg/dL     CBC Auto Differential [[id number]]  (Abnormal) Collected: 04/16/23 1338    Specimen: Blood Updated: 04/16/23 1349     WBC 12.24 10*3/mm3      RBC 5.52 10*6/mm3      Hemoglobin 17.1 g/dL      Hematocrit 50.0 %      MCV 90.6 fL      MCH 31.0 pg      MCHC 34.2 g/dL      RDW 12.5 %      RDW-SD 41.7 fl      MPV 9.8 fL      Platelets 292 10*3/mm3      Neutrophil % 46.6 %      Lymphocyte % 43.0 %      Monocyte % 8.0 %      Eosinophil % 1.2 %      Basophil % 0.4 %      Immature Grans % 0.8 %      Neutrophils, Absolute 5.70 10*3/mm3      Lymphocytes, Absolute 5.26 10*3/mm3      Monocytes, Absolute 0.98 10*3/mm3      Eosinophils, Absolute 0.15 10*3/mm3      Basophils, Absolute 0.05 10*3/mm3      Immature Grans, Absolute 0.10 10*3/mm3      nRBC 0.0 /100 WBC     High Sensitivity Troponin T 2Hr [[id number]] Collected: 04/16/23 1639    Specimen: Blood Updated: 04/16/23 1639           Imaging:  CT Chest With Contrast Diagnostic    Result Date: 4/16/2023  PROCEDURE: CT CHEST W CONTRAST DIAGNOSTIC  COMPARISON:  [facility], CR, XR CHEST 1 VW, 12/17/2021, 3:38.  [facility], CR, XR CHEST 1 VW, 5/01/2022, 13:07.  [facility], CR, XR CHEST 1 VW, 4/16/2023, 13:29.  [facility], CT, CHEST W/O CONTRAST, 11/09/2011, 14:11.  [facility], CT, CT ABDOMEN PELVIS W CONTRAST, 4/04/2023, 18:53. INDICATIONS: Shortness of breath and tachycardia  TECHNIQUE: After obtaining the patient's consent, CT images were obtained with non-ionic intravenous contrast material.   PROTOCOL:   Pulmonary embolism imaging protocol performed    RADIATION:   DLP: 244.4mGy*cm   Automated exposure control was utilized to minimize radiation dose. CONTRAST: 68cc Isovue 370 I.V. LABS:   eGFR: >60ml/min/1.73m2  FINDINGS:  No pulmonary embolism is identified.  A small left  pleural effusion is noted.  A small pericardial effusion is evident.  No adenopathy is identified.  Consolidation is noted in the left lower lobe.  There is a prominent bulla noted in the medial aspect of the left lower lobe.  Marked emphysematous changes are noted.  There is an incompletely imaged fusiform abdominal aortic aneurysm measuring at least 5.1 cm AP.  A bifurcated stent graft has been placed.  A cholecystectomy has been performed.  In the right hepatic lobe on image 267 is a low-density mass consistent with a cyst.  In the left hepatic lobe at the same level is a 0.8 cm hypodense focus, suspected to represent a cyst but too small for definitive characterization.  There is moderate to severe atrophy of the left kidney.  There are suspected bilateral renal cysts.  No focal osseous lesion is seen.        1. No evidence of pulmonary embolism 2. Small left pleural and pericardial effusions 3. Consolidation in the left lower lobe may represent atelectasis or pneumonia. 4. Abdominal aortic aneurysm, incompletely imaged on this examination 5. Previous cholecystectomy 6. Moderate to severe atrophy of the left kidney 7. Marked emphysematous changes     Jermaine Biggs M.D.       Electronically Signed and Approved By: Jermaine Biggs M.D. on 4/16/2023 at 17:43             XR Chest 1 View    Result Date: 4/16/2023  PROCEDURE: XR CHEST 1 VW  COMPARISON: [facility], , XR CHEST 1 VW, 5/01/2022, 13:07.  INDICATIONS: Chest Pain Triage Protocol  FINDINGS:  The lungs are clear bilaterally.  The cardiac and mediastinal silhouettes appear normal.  No effusion is evident.        1. No acute cardiopulmonary disease.       Jermaine Biggs M.D.       Electronically Signed and Approved By: Jermaine Biggs M.D. on 4/16/2023 at 14:38               Differential Diagnosis and Discussion:    Chest Pain:  Based on the patient's signs and symptoms, I considered aortic dissection, myocardial infaction, pulmonary embolism, cardiac  tamponade, pericarditis, pneumothorax, musculoskeletal chest pain and other differential diagnosis as an etiology of the patient's chest pain.     All labs were reviewed and interpreted by me.  All X-rays impressions were independently interpreted by me.  EKG was interpreted by me.    MDM  Number of Diagnoses or Management Options           Patient Care Considerations:    CT HEAD: I considered ordering a noncontrast CT of the head, however The patient is currently alert and oriented    Consultants/Shared Management Plan:    Hospitalist: I have discussed the case with Hospitalist who agrees to accept the patient for admission.    Social Determinants of Health:    Patient is independent, reliable, and has access to care.     Disposition and Care Coordination:    Admit:   Through independent evaluation of the patient's history, physical, and imperical data, the patient meets criteria for observation/admission to the hospital.        Final diagnoses:   COPD exacerbation   Acute respiratory failure with hypoxia   Pneumonia due to infectious organism, unspecified laterality, unspecified part of lung        ED Disposition     ED Disposition   Decision to Admit    Condition   --    Comment   --             This medical record created using voice recognition software.    Documentation assistance provided by Madeleine Ruano acting a scribe for Jarek Colindres DO. Information recorded by the scribe was verified and validated at my direction.     Madeleine Ruano  04/16/23 1454       Madeleine Ruano  04/16/23 1502       Liliana Brownlee  04/16/23 1527       Liliana Brownlee  04/16/23 1537       Madeleine Ruano  04/16/23 1546       Jarek Colindres DO  04/16/23 3477

## 2023-04-17 PROBLEM — E43 SEVERE MALNUTRITION: Status: ACTIVE | Noted: 2023-04-17

## 2023-04-17 LAB
ALBUMIN SERPL-MCNC: 3.4 G/DL (ref 3.5–5.2)
ALBUMIN/GLOB SERPL: 1.7 G/DL
ALP SERPL-CCNC: 52 U/L (ref 39–117)
ALT SERPL W P-5'-P-CCNC: 5 U/L (ref 1–33)
ANION GAP SERPL CALCULATED.3IONS-SCNC: 10 MMOL/L (ref 5–15)
ANION GAP SERPL CALCULATED.3IONS-SCNC: 9.2 MMOL/L (ref 5–15)
AST SERPL-CCNC: 12 U/L (ref 1–32)
BASOPHILS # BLD AUTO: 0.01 10*3/MM3 (ref 0–0.2)
BASOPHILS NFR BLD AUTO: 0.2 % (ref 0–1.5)
BILIRUB SERPL-MCNC: 0.3 MG/DL (ref 0–1.2)
BUN SERPL-MCNC: 11 MG/DL (ref 8–23)
BUN SERPL-MCNC: 13 MG/DL (ref 8–23)
BUN/CREAT SERPL: 15.1 (ref 7–25)
BUN/CREAT SERPL: 16.3 (ref 7–25)
CALCIUM SPEC-SCNC: 9 MG/DL (ref 8.6–10.5)
CALCIUM SPEC-SCNC: 9.1 MG/DL (ref 8.6–10.5)
CHLORIDE SERPL-SCNC: 101 MMOL/L (ref 98–107)
CHLORIDE SERPL-SCNC: 103 MMOL/L (ref 98–107)
CO2 SERPL-SCNC: 22 MMOL/L (ref 22–29)
CO2 SERPL-SCNC: 24.8 MMOL/L (ref 22–29)
CREAT SERPL-MCNC: 0.73 MG/DL (ref 0.57–1)
CREAT SERPL-MCNC: 0.8 MG/DL (ref 0.57–1)
D-LACTATE SERPL-SCNC: 1.7 MMOL/L (ref 0.5–2)
D-LACTATE SERPL-SCNC: 2.5 MMOL/L (ref 0.5–2)
DEPRECATED RDW RBC AUTO: 41.9 FL (ref 37–54)
EGFRCR SERPLBLD CKD-EPI 2021: 78.4 ML/MIN/1.73
EGFRCR SERPLBLD CKD-EPI 2021: 87.5 ML/MIN/1.73
EOSINOPHIL # BLD AUTO: 0.03 10*3/MM3 (ref 0–0.4)
EOSINOPHIL NFR BLD AUTO: 0.5 % (ref 0.3–6.2)
ERYTHROCYTE [DISTWIDTH] IN BLOOD BY AUTOMATED COUNT: 12.7 % (ref 12.3–15.4)
GLOBULIN UR ELPH-MCNC: 2 GM/DL
GLUCOSE SERPL-MCNC: 181 MG/DL (ref 65–99)
GLUCOSE SERPL-MCNC: 93 MG/DL (ref 65–99)
HCT VFR BLD AUTO: 38.1 % (ref 34–46.6)
HCT VFR BLD AUTO: 40.9 % (ref 34–46.6)
HGB BLD-MCNC: 13.2 G/DL (ref 12–15.9)
HGB BLD-MCNC: 14.2 G/DL (ref 12–15.9)
IMM GRANULOCYTES # BLD AUTO: 0.09 10*3/MM3 (ref 0–0.05)
IMM GRANULOCYTES NFR BLD AUTO: 1.5 % (ref 0–0.5)
L PNEUMO1 AG UR QL IA: NEGATIVE
LYMPHOCYTES # BLD AUTO: 0.85 10*3/MM3 (ref 0.7–3.1)
LYMPHOCYTES NFR BLD AUTO: 14 % (ref 19.6–45.3)
MAGNESIUM SERPL-MCNC: 1.7 MG/DL (ref 1.6–2.4)
MCH RBC QN AUTO: 31.4 PG (ref 26.6–33)
MCHC RBC AUTO-ENTMCNC: 34.7 G/DL (ref 31.5–35.7)
MCV RBC AUTO: 90.5 FL (ref 79–97)
MONOCYTES # BLD AUTO: 0.11 10*3/MM3 (ref 0.1–0.9)
MONOCYTES NFR BLD AUTO: 1.8 % (ref 5–12)
MRSA DNA SPEC QL NAA+PROBE: NORMAL
NEUTROPHILS NFR BLD AUTO: 5 10*3/MM3 (ref 1.7–7)
NEUTROPHILS NFR BLD AUTO: 82 % (ref 42.7–76)
NRBC BLD AUTO-RTO: 0 /100 WBC (ref 0–0.2)
PLATELET # BLD AUTO: 247 10*3/MM3 (ref 140–450)
PMV BLD AUTO: 8.7 FL (ref 6–12)
POTASSIUM SERPL-SCNC: 3.7 MMOL/L (ref 3.5–5.2)
POTASSIUM SERPL-SCNC: 3.7 MMOL/L (ref 3.5–5.2)
PROT SERPL-MCNC: 5.4 G/DL (ref 6–8.5)
QT INTERVAL: 362 MS
RBC # BLD AUTO: 4.52 10*6/MM3 (ref 3.77–5.28)
S PNEUM AG SPEC QL LA: NEGATIVE
SODIUM SERPL-SCNC: 135 MMOL/L (ref 136–145)
SODIUM SERPL-SCNC: 135 MMOL/L (ref 136–145)
TROPONIN T SERPL HS-MCNC: 15 NG/L
TROPONIN T SERPL HS-MCNC: 18 NG/L
TROPONIN T SERPL HS-MCNC: 18 NG/L
WBC NRBC COR # BLD: 6.09 10*3/MM3 (ref 3.4–10.8)

## 2023-04-17 PROCEDURE — 83735 ASSAY OF MAGNESIUM: CPT | Performed by: STUDENT IN AN ORGANIZED HEALTH CARE EDUCATION/TRAINING PROGRAM

## 2023-04-17 PROCEDURE — 83605 ASSAY OF LACTIC ACID: CPT | Performed by: EMERGENCY MEDICINE

## 2023-04-17 PROCEDURE — 94799 UNLISTED PULMONARY SVC/PX: CPT

## 2023-04-17 PROCEDURE — 25010000002 METHYLPREDNISOLONE PER 40 MG: Performed by: STUDENT IN AN ORGANIZED HEALTH CARE EDUCATION/TRAINING PROGRAM

## 2023-04-17 PROCEDURE — 94664 DEMO&/EVAL PT USE INHALER: CPT

## 2023-04-17 PROCEDURE — 94667 MNPJ CHEST WALL 1ST: CPT

## 2023-04-17 PROCEDURE — 87899 AGENT NOS ASSAY W/OPTIC: CPT | Performed by: STUDENT IN AN ORGANIZED HEALTH CARE EDUCATION/TRAINING PROGRAM

## 2023-04-17 PROCEDURE — 25010000002 VANCOMYCIN 5 G RECONSTITUTED SOLUTION: Performed by: STUDENT IN AN ORGANIZED HEALTH CARE EDUCATION/TRAINING PROGRAM

## 2023-04-17 PROCEDURE — 99232 SBSQ HOSP IP/OBS MODERATE 35: CPT | Performed by: FAMILY MEDICINE

## 2023-04-17 PROCEDURE — 84484 ASSAY OF TROPONIN QUANT: CPT | Performed by: FAMILY MEDICINE

## 2023-04-17 PROCEDURE — 94761 N-INVAS EAR/PLS OXIMETRY MLT: CPT

## 2023-04-17 PROCEDURE — 85018 HEMOGLOBIN: CPT | Performed by: FAMILY MEDICINE

## 2023-04-17 PROCEDURE — 87449 NOS EACH ORGANISM AG IA: CPT | Performed by: STUDENT IN AN ORGANIZED HEALTH CARE EDUCATION/TRAINING PROGRAM

## 2023-04-17 PROCEDURE — 80053 COMPREHEN METABOLIC PANEL: CPT | Performed by: STUDENT IN AN ORGANIZED HEALTH CARE EDUCATION/TRAINING PROGRAM

## 2023-04-17 PROCEDURE — 99223 1ST HOSP IP/OBS HIGH 75: CPT | Performed by: INTERNAL MEDICINE

## 2023-04-17 PROCEDURE — 84484 ASSAY OF TROPONIN QUANT: CPT | Performed by: STUDENT IN AN ORGANIZED HEALTH CARE EDUCATION/TRAINING PROGRAM

## 2023-04-17 PROCEDURE — 25010000002 CEFEPIME PER 500 MG: Performed by: STUDENT IN AN ORGANIZED HEALTH CARE EDUCATION/TRAINING PROGRAM

## 2023-04-17 PROCEDURE — 85014 HEMATOCRIT: CPT | Performed by: FAMILY MEDICINE

## 2023-04-17 PROCEDURE — 94668 MNPJ CHEST WALL SBSQ: CPT

## 2023-04-17 PROCEDURE — 25010000002 ENOXAPARIN PER 10 MG: Performed by: INTERNAL MEDICINE

## 2023-04-17 PROCEDURE — 85025 COMPLETE CBC W/AUTO DIFF WBC: CPT | Performed by: STUDENT IN AN ORGANIZED HEALTH CARE EDUCATION/TRAINING PROGRAM

## 2023-04-17 PROCEDURE — 93005 ELECTROCARDIOGRAM TRACING: CPT | Performed by: FAMILY MEDICINE

## 2023-04-17 RX ORDER — HYDROCODONE BITARTRATE AND ACETAMINOPHEN 5; 325 MG/1; MG/1
1 TABLET ORAL ONCE AS NEEDED
Status: COMPLETED | OUTPATIENT
Start: 2023-04-17 | End: 2023-04-17

## 2023-04-17 RX ORDER — BUDESONIDE 0.5 MG/2ML
0.5 INHALANT ORAL
Status: DISCONTINUED | OUTPATIENT
Start: 2023-04-17 | End: 2023-04-26 | Stop reason: HOSPADM

## 2023-04-17 RX ORDER — SODIUM CHLORIDE FOR INHALATION 3 %
4 VIAL, NEBULIZER (ML) INHALATION
Status: DISCONTINUED | OUTPATIENT
Start: 2023-04-17 | End: 2023-04-19

## 2023-04-17 RX ORDER — ALPRAZOLAM 0.25 MG/1
0.25 TABLET ORAL 3 TIMES DAILY PRN
Status: DISCONTINUED | OUTPATIENT
Start: 2023-04-17 | End: 2023-04-22

## 2023-04-17 RX ORDER — ENOXAPARIN SODIUM 100 MG/ML
30 INJECTION SUBCUTANEOUS ONCE
Status: COMPLETED | OUTPATIENT
Start: 2023-04-17 | End: 2023-04-17

## 2023-04-17 RX ORDER — ENOXAPARIN SODIUM 100 MG/ML
30 INJECTION SUBCUTANEOUS DAILY
Status: DISCONTINUED | OUTPATIENT
Start: 2023-04-19 | End: 2023-04-26 | Stop reason: HOSPADM

## 2023-04-17 RX ORDER — ALBUTEROL SULFATE 2.5 MG/3ML
2.5 SOLUTION RESPIRATORY (INHALATION) EVERY 6 HOURS PRN
Status: DISCONTINUED | OUTPATIENT
Start: 2023-04-17 | End: 2023-04-26 | Stop reason: HOSPADM

## 2023-04-17 RX ORDER — ARFORMOTEROL TARTRATE 15 UG/2ML
15 SOLUTION RESPIRATORY (INHALATION)
Status: DISCONTINUED | OUTPATIENT
Start: 2023-04-17 | End: 2023-04-26 | Stop reason: HOSPADM

## 2023-04-17 RX ADMIN — Medication 5 MG: at 01:28

## 2023-04-17 RX ADMIN — Medication 4 ML: at 07:01

## 2023-04-17 RX ADMIN — SODIUM CHLORIDE 100 ML/HR: 9 INJECTION, SOLUTION INTRAVENOUS at 05:02

## 2023-04-17 RX ADMIN — ACETAMINOPHEN 650 MG: 325 TABLET ORAL at 07:38

## 2023-04-17 RX ADMIN — PANTOPRAZOLE SODIUM 40 MG: 40 TABLET, DELAYED RELEASE ORAL at 05:03

## 2023-04-17 RX ADMIN — Medication 10 ML: at 08:56

## 2023-04-17 RX ADMIN — SODIUM CHLORIDE 40 ML: 9 INJECTION, SOLUTION INTRAVENOUS at 16:33

## 2023-04-17 RX ADMIN — PRAVASTATIN SODIUM 40 MG: 40 TABLET ORAL at 21:13

## 2023-04-17 RX ADMIN — IPRATROPIUM BROMIDE AND ALBUTEROL SULFATE 3 ML: 2.5; .5 SOLUTION RESPIRATORY (INHALATION) at 11:51

## 2023-04-17 RX ADMIN — HYDROCODONE BITARTRATE AND ACETAMINOPHEN 1 TABLET: 5; 325 TABLET ORAL at 21:12

## 2023-04-17 RX ADMIN — NITROGLYCERIN 0.4 MG: 0.4 TABLET, ORALLY DISINTEGRATING SUBLINGUAL at 12:29

## 2023-04-17 RX ADMIN — ARFORMOTEROL TARTRATE 15 MCG: 15 SOLUTION RESPIRATORY (INHALATION) at 19:10

## 2023-04-17 RX ADMIN — LISINOPRIL 20 MG: 20 TABLET ORAL at 08:53

## 2023-04-17 RX ADMIN — CEFEPIME HYDROCHLORIDE 2 G: 2 INJECTION, POWDER, FOR SOLUTION INTRAMUSCULAR; INTRAVENOUS at 09:09

## 2023-04-17 RX ADMIN — ALPRAZOLAM 0.25 MG: 0.25 TABLET ORAL at 18:22

## 2023-04-17 RX ADMIN — ENOXAPARIN SODIUM 30 MG: 100 INJECTION SUBCUTANEOUS at 16:31

## 2023-04-17 RX ADMIN — CITALOPRAM HYDROBROMIDE 20 MG: 20 TABLET ORAL at 08:53

## 2023-04-17 RX ADMIN — METHYLPREDNISOLONE SODIUM SUCCINATE 40 MG: 40 INJECTION, POWDER, FOR SOLUTION INTRAMUSCULAR; INTRAVENOUS at 08:56

## 2023-04-17 RX ADMIN — BUDESONIDE 0.5 MG: 0.5 SUSPENSION RESPIRATORY (INHALATION) at 19:10

## 2023-04-17 RX ADMIN — Medication 5 MG: at 22:56

## 2023-04-17 RX ADMIN — IPRATROPIUM BROMIDE AND ALBUTEROL SULFATE 3 ML: 2.5; .5 SOLUTION RESPIRATORY (INHALATION) at 07:01

## 2023-04-17 RX ADMIN — Medication 10 ML: at 21:14

## 2023-04-17 RX ADMIN — VANCOMYCIN HYDROCHLORIDE 1000 MG: 5 INJECTION, POWDER, LYOPHILIZED, FOR SOLUTION INTRAVENOUS at 11:26

## 2023-04-17 RX ADMIN — Medication 4 ML: at 19:10

## 2023-04-17 RX ADMIN — CEFEPIME HYDROCHLORIDE 2 G: 2 INJECTION, POWDER, FOR SOLUTION INTRAMUSCULAR; INTRAVENOUS at 22:55

## 2023-04-17 RX ADMIN — NITROGLYCERIN 0.4 MG: 0.4 TABLET, ORALLY DISINTEGRATING SUBLINGUAL at 12:43

## 2023-04-17 NOTE — CONSULTS
Pulmonary / Critical Care Consult Note      Patient Name: Bessy Leggett  : 1950  MRN: [id number]  Primary Care Physician:  Erich Joya DO  Referring Physician: Dick Mitchell DO  Date of admission: 2023    Subjective   Subjective     Reason for Consult/ Chief Complaint: Acute hypoxic respiratory failure, HCAP, COPD exacerbation      HPI:  Bessy Leggett is a 73 y.o. female with past medical history of COPD not on home oxygen, dementia, depression/anxiety, GERD, chronic low back pain and diabetes who presented to the emergency department with a 4-day history of worsening mental status, cough and shortness of breath.  Patient is a poor historian and PMH is obtained from medical record.  Patient was recently hospitalized on 2023 for GI bleed and COVID.  Patient was discharged home on 2023, however, patient's daughter reports over the last 4 days the patient has become more lethargic with decreased activity.  The patient has also been complaining of left pleuritic chest pain, worsening dyspnea, and productive cough.  Daughter reports patient's oxygen saturations were in the 80s at home on room air and they were using another family numbers supplemental oxygen to treat.    Upon arrival to the emergency department patient was found to be tachycardic at 114 and tachypneic at 35.  She was hypoxic on room air.  Lab work revealed mildly elevated proBNP of 1199, WBC 12.24 and lactate 3.4.  CT chest with contrast revealed no evidence of pulmonary embolus, small left pleural effusion, consolidation in left lower lobe and marked emphysematous changes.  Patient was admitted to hospital service and started on ceftriaxone and doxycycline for HCAP coverage.  Pulmonology then consulted for the above reasons.      Review of Systems  Unable to obtain due to mental status    Personal History     Past Medical History:   Diagnosis Date   • Anxiety 2018    PATIENT REPORTS A LONG HISTORY OF ANXIETY ALONG [facility]  AGOURA PHOBIA. SHE HAS BEEN PREVIOUSLY BEEN SEEN BY PSYCH RECENTLY., HER XANAX WAS STOPPED. I WILL REFER THE PATIENT FOR EVALUATION BY MENTAL HEALTH SPECIALIST   • Asthma    • Cataract    • COPD (chronic obstructive pulmonary disease)    • Depression    • GERD (gastroesophageal reflux disease)    • Hypertension    • IBS (irritable bowel syndrome)    • Low back pain        Past Surgical History:   Procedure Laterality Date   • ABDOMINAL SURGERY     • COLONOSCOPY     • ENDOSCOPY N/A 4/5/2023    Procedure: ESOPHAGOGASTRODUODENOSCOPY WITH BIOPSIES;  Surgeon: Shaina Pastrana MD;  Location: [facility] ENDOSCOPY;  Service: Gastroenterology;  Laterality: N/A;  GASTRITIS  HIATAL HERNIA   • GALLBLADDER SURGERY     • HYSTERECTOMY     • VASCULAR SURGERY         Family History: family history includes Cancer in an other family member; Diabetes in an other family member; Heart disease in an other family member; Hypertension in an other family member; Stroke in an other family member. Otherwise pertinent FHx was reviewed and not pertinent to current issue.    Social History:  reports that she has been smoking. She has never used smokeless tobacco. She reports that she does not drink alcohol and does not use drugs.    Home Medications:  Umeclidinium-Vilanterol, albuterol sulfate HFA, aspirin, citalopram, lisinopril, omeprazole, and pravastatin    Allergies:  Allergies   Allergen Reactions   • Penicillins Shortness Of Breath, Itching, Swelling and Rash     Has tolerated ceftriaxone, cefdinir, and cefepime -Tyrell [facility]   • Buspirone Unknown - High Severity   • Ibuprofen Irritability   • Rofecoxib Unknown - Low Severity   • Sumatriptan Unknown - High Severity   • Tramadol Unknown - High Severity   • Tylenol With Codeine #3 [Acetaminophen-Codeine] Unknown - High Severity       Objective    Objective     Vitals:   Temp:  [97.9 °F (36.6 °C)-98.5 °F (36.9 °C)] 98.5 °F (36.9 °C)  Heart Rate:  [] 82  Resp:  [16-29]  16  BP: (125-169)/(68-97) 141/85  Flow (L/min):  [2-3] 3    Physical Exam:  Vital Signs Reviewed   General: Frail elderly female, Alert, NAD.    HEENT:  PERRL, EOMI.  OP, nares clear, no sinus tenderness  Neck:  Supple, no JVD, no thyromegaly  Lymph: no axillary, cervical, supraclavicular lymphadenopathy noted bilaterally  Chest:  good aeration, diminished with rhonchi at left base with bilateral wheezing, tympanic to percussion bilaterally, no work of breathing noted  CV: RRR, no MGR, pulses 2+, equal.  Abd:  Soft, NT, ND, + BS, no HSM  EXT:  no clubbing, no cyanosis, no edema, no joint tenderness  Neuro:  A&Ox3, CN grossly intact, no focal deficits.  Skin: No rashes or lesions noted      Result Review    Result Review:  I have personally reviewed the results from the time of this admission to 4/17/2023 16:17 EDT and agree with these findings:  [x]  Laboratory  [x]  Microbiology  [x]  Radiology  []  EKG/Telemetry   [x]  Cardiology/Vascular   []  Pathology  []  Old records  []  Other:  Most notable findings include:    proBNP 1199  Lactate 1.7        Lab 04/17/23  1255 04/17/23  0200 04/16/23  1338   WBC  --  6.09 12.24*   HEMOGLOBIN 13.2 14.2 17.1*   HEMATOCRIT 38.1 40.9 50.0*   PLATELETS  --  247 292   SODIUM 135* 135* 136   POTASSIUM 3.7 3.7 3.5   CHLORIDE 103 101 97*   CO2 22.0 24.8 24.8   BUN 11 13 11   CREATININE 0.73 0.80 0.96   GLUCOSE 93 181* 131*   CALCIUM 9.0 9.1 10.0   TOTAL PROTEIN 5.4*  --  7.2   ALBUMIN 3.4*  --  4.0   GLOBULIN 2.0  --  3.2     CT Chest With Contrast Diagnostic    Result Date: 4/16/2023  PROCEDURE: CT CHEST W CONTRAST DIAGNOSTIC  COMPARISON:  [facility], CR, XR CHEST 1 VW, 12/17/2021, 3:38.  [facility], CR, XR CHEST 1 VW, 5/01/2022, 13:07.  [facility], CR, XR CHEST 1 VW, 4/16/2023, 13:29.  [facility], CT, CHEST W/O CONTRAST, 11/09/2011, 14:11.  [facility], CT, CT ABDOMEN PELVIS W CONTRAST, 4/04/2023, 18:53.  INDICATIONS: Shortness of breath and tachycardia  TECHNIQUE: After obtaining the patient's consent, CT images were obtained with non-ionic intravenous contrast material.   PROTOCOL:   Pulmonary embolism imaging protocol performed    RADIATION:   DLP: 244.4mGy*cm   Automated exposure control was utilized to minimize radiation dose. CONTRAST: 68cc Isovue 370 I.V. LABS:   eGFR: >60ml/min/1.73m2  FINDINGS:  No pulmonary embolism is identified.  A small left pleural effusion is noted.  A small pericardial effusion is evident.  No adenopathy is identified.  Consolidation is noted in the left lower lobe.  There is a prominent bulla noted in the medial aspect of the left lower lobe.  Marked emphysematous changes are noted.  There is an incompletely imaged fusiform abdominal aortic aneurysm measuring at least 5.1 cm AP.  A bifurcated stent graft has been placed.  A cholecystectomy has been performed.  In the right hepatic lobe on image 267 is a low-density mass consistent with a cyst.  In the left hepatic lobe at the same level is a 0.8 cm hypodense focus, suspected to represent a cyst but too small for definitive characterization.  There is moderate to severe atrophy of the left kidney.  There are suspected bilateral renal cysts.  No focal osseous lesion is seen.      Impression:   1. No evidence of pulmonary embolism 2. Small left pleural and pericardial effusions 3. Consolidation in the left lower lobe may represent atelectasis or pneumonia. 4. Abdominal aortic aneurysm, incompletely imaged on this examination 5. Previous cholecystectomy 6. Moderate to severe atrophy of the left kidney 7. Marked emphysematous changes     Jermaine Biggs M.D.       Electronically Signed and Approved By: Jermaine Biggs M.D. on 4/16/2023 at 17:43                 Assessment & Plan   Assessment / Plan     Active Hospital Problems:  Active Hospital Problems    Diagnosis    • **Acute respiratory failure with hypoxia    • Severe Malnutrition (HCC)       Impression:  Acute hypoxic respiratory failure  Healthcare associat nosocomial pneumonia versus postobstructive pneumonia left lower lobe from unspecified organism  Mucous plugging versus endobronchial lesion left lower lobe  Question aspiration and airways  Lactic acidosis  Sepsis  Acute COPD exacerbation  Chronic dementia  Ongoing tobacco use of cigarettes     Plan:  -Currently on 3 L per nasal cannula, continue to wean to maintain SPO2 greater than 90%  -Start Brovana, Pulmicort, Yupelri and albuterol nebulizers  -Start bronchopulmonary hygiene  -Discussed with family at the bedside that chest he shows possible postobstructive pneumonia related to mucous plug versus foreign body versus possible endobronchial lesion given her smoking history  -Will take for bronchoscopy with airway inspection, brushings, biopsies, bronchoalveolar lavage. I have discussed the risks of the procedure with the patient including pneumothorax, hemothorax, bleeding, hypoxia, required mechanical ventilation and death. The patient recognizes these findings, acknowledges these findings and is agreeable to the procedure.  -N.p.o. after midnight for bronchoscopy  -DC vancomycin as MRSA PCR is negative.  On cefepime day 1.  De-escalate based off cultures  -Continue IV steroids  -Patient's family is requested neurology evaluation while inpatient as she has a pending work-up for dementia.  Defer this to hospitalist  -Sputum culture pending  -Encourage I-S/flutter valve  -PT on board  -Encourage mobilization, out of bed to chair    DVT prophylaxis:  Medical DVT prophylaxis orders are present.     Code Status and Medical Interventions:   Ordered at: 04/16/23 8943     Level Of Support Discussed With:    Health Care Surrogate    Next of Kin (If No Surrogate)     Code Status (Patient has no pulse and is not breathing):    CPR (Attempt to Resuscitate)     Medical Interventions (Patient has pulse or is breathing):    Full Support        Labs,  imaging, microbiology, notes and medications personally reviewed  Discussed with primary    Thank you for involving me in the care of this patient.    Electronically signed by ROSANGELA Camarillo, 04/17/23, 4:17 PM EDT.    I, Dr. Cabrera Obando, have spent more than 50% of the total time managing the patient in this encounter today.  This included personally reviewing all pertinent labs, imaging, microbiology and documentation. Also discussing the case with the patient and any available family, the admitting physician and any available ancillary staff.    Electronically signed by Cabrera Obando MD, 04/17/23, 4:28 PM EDT.

## 2023-04-17 NOTE — PROGRESS NOTES
Respiratory Therapist Broncho-Pulmonary Hygiene Progress Note      Patient Name:  Bessy Leggett  YOB: 1950    Bessy Leggett meets the qualification for Level 3 of the Bronco-Pulmonary Hygiene Protocol. This was based on my daily patient assessment and includes review of chest x-ray results, cough ability and quality, oxygenation, secretions or risk for secretion development and patient mobility.     Broncho-Pulmonary Hygiene Assessment:    Level of Movement: Actively changing positions-requires assistance  Disoriented/Follows Commands    Breath Sounds: Diminished and/or coarse rhonchi    Cough: Strong, effective and/or frequent    Chest X-Ray: No CXR available    Sputum Productions: None or small amount of thin or watery secretions with effective cough    History and Physical: New onset of bronchitis or existing chronic pulmonary conditions.  **(not in an exacerbation)    SpO2 to Oxygen Need: greater than 92% on room air or  less than 3L nasal canula    Current SpO2 is: 96% on 3L    Based on this information, I have completed the following interventions: CPT (precussor)    After patients initial treatment she became developed coarse wheezes and rhonchi so patient will be switched to CPT with precussor.    Electronically signed by Alexia Shetty, BAYLEE, 04/17/23, 12:37 AM EDT.

## 2023-04-17 NOTE — PLAN OF CARE
Goal Outcome Evaluation:    Patient was admitted in the floor , from ED, accompanied by her daughter, patient was too weak to ambulate,Patient has intermittent confusion, but most of the time she is only alert to herself, on 3 liters of oxygen via nasal canula, tolerating well, and with continuous pulse oximeter. her lactic acid was 3.4 when she came up in the floor, and her latest lactic is 1.7. no complaint of pain or discomfort,not in distress.

## 2023-04-17 NOTE — PROGRESS NOTES
Respiratory Therapist Broncho-Pulmonary Hygiene Progress Note      Patient Name:  Bessy Leggett  YOB: 1950    Bessy Leggett meets the qualification for Level 2 of the Bronco-Pulmonary Hygiene Protocol. This was based on my daily patient assessment and includes review of chest x-ray results, cough ability and quality, oxygenation, secretions or risk for secretion development and patient mobility.     Broncho-Pulmonary Hygiene Assessment:    Level of Movement: Actively changing positions-requires assistance  Disoriented/Follows Commands    Breath Sounds: Diminished and/or coarse rhonchi    Cough: Strong, effective and/or frequent    Chest X-Ray: No CXR available    Sputum Productions: None or small amount of thin or watery secretions with effective cough    History and Physical: New onset of bronchitis or existing chronic pulmonary conditions.  **(not in an exacerbation)    SpO2 to Oxygen Need: greater than 92% on room air or  less than 3L nasal canula    Current SpO2 is: 99% on 3L    Based on this information, I have completed the following interventions: Aerobika with bronchodialtor medication or TID      Electronically signed by Alexia Shetty RRT, 04/16/23, 10:36 PM EDT.

## 2023-04-17 NOTE — CONSULTS
"Nutrition Services    Patient Name: Bessy Leggett  YOB: 1950  MRN: [id number]  Admission date: 4/16/2023      CLINICAL NUTRITION ASSESSMENT      Reason for Assessment  MST score 2+   H&P:    Past Medical History:   Diagnosis Date   • Anxiety 05/23/2018    PATIENT REPORTS A LONG HISTORY OF ANXIETY ALONG [facility]OURA PHOBIA. SHE HAS BEEN PREVIOUSLY BEEN SEEN BY PSYCH RECENTLY., HER XANAX WAS STOPPED. I WILL REFER THE PATIENT FOR EVALUATION BY MENTAL HEALTH SPECIALIST   • Asthma    • Cataract    • COPD (chronic obstructive pulmonary disease)    • Depression    • GERD (gastroesophageal reflux disease)    • Hypertension    • IBS (irritable bowel syndrome)    • Low back pain         Current Problems:   Active Hospital Problems    Diagnosis    • **Acute respiratory failure with hypoxia         Nutrition/Diet History         Narrative     RD consult triggered by MST score 4 for 24-33 lb wt loss and decreased appetite. Pt presented to ED w/ chest pain, admitted w/ acute respiratory failure w/ hypoxia. PMH significant for COPD. Pt alert to self only at this time. Over the past 6 months there has been 14.3% decrease it wt. Heart healthy diet w/ no po intakes recorded at this time.    RD visited after lunch. Family at bedside reports pt ate 1/2 bag of chips last night and a little bit of coffee this morning. Pt awake and alert, able to answer some questions but not all. Daughter answered questions pt couldn't. Pt denies any difficulty chewing or eating. Poor appetite d/t not feeling well. NFPE performed finding significant fat and muscle losses. Pt agreeable to vanilla ONS TID. Will order and continue to monitor per protocol.      Anthropometrics        Current Height, Weight Height: 157.5 cm (62\")  Weight: 49.4 kg (108 lb 14.5 oz)   Current BMI Body mass index is 19.92 kg/m².       Weight Hx  Wt Readings from Last 30 Encounters:   04/16/23 1320 49.4 kg (108 lb 14.5 oz)   04/04/23 2100 49.6 kg (109 lb 5.6 oz) "   04/04/23 1614 47.6 kg (105 lb)   10/21/22 1131 57.2 kg (126 lb)   05/01/22 1229 62 kg (136 lb 11 oz)   12/17/21 0323 63.2 kg (139 lb 5.3 oz)   01/13/21 0000 59.9 kg (132 lb)   01/10/20 0000 51 kg (112 lb 6 oz)   12/17/19 0000 50.6 kg (111 lb 8 oz)   09/23/19 0000 50.4 kg (111 lb 2 oz)   07/02/19 0000 47.7 kg (105 lb 2 oz)   04/12/19 0000 47.3 kg (104 lb 6 oz)   01/29/19 0000 49.4 kg (109 lb)   12/14/18 0000 47.2 kg (104 lb)   10/23/18 0000 49.4 kg (109 lb)   10/19/18 0000 49.4 kg (109 lb)   09/17/18 0000 50.3 kg (111 lb)   07/06/18 0000 53.5 kg (118 lb)   06/07/18 0000 54.7 kg (120 lb 8 oz)   05/23/18 0000 58.3 kg (128 lb 8 oz)   03/14/18 0000 56.7 kg (125 lb)   03/05/18 0000 57.6 kg (127 lb)            Wt Change Observation 14.3% decrease x 6 months     Estimated/Assessed Needs       Energy Requirements 30-35 kcal/kg   EST Needs (kcal/day) 9437-5049 kcal       Protein Requirements 1.2-1.5 g/kg   EST Daily Needs (g/day) 59-74 g       Fluid Requirements 1 ml/kcal    Estimated Needs (mL/day) 5974-1723 ml     Labs/Medications         Pertinent Labs Reviewed.   Results from last 7 days   Lab Units 04/17/23  0200 04/16/23  1338   SODIUM mmol/L 135* 136   POTASSIUM mmol/L 3.7 3.5   CHLORIDE mmol/L 101 97*   CO2 mmol/L 24.8 24.8   BUN mg/dL 13 11   CREATININE mg/dL 0.80 0.96   CALCIUM mg/dL 9.1 10.0   BILIRUBIN mg/dL  --  0.5   ALK PHOS U/L  --  72   ALT (SGPT) U/L  --  6   AST (SGOT) U/L  --  13   GLUCOSE mg/dL 181* 131*     Results from last 7 days   Lab Units 04/17/23  0200 04/16/23  1338   MAGNESIUM mg/dL 1.7 1.8   HEMOGLOBIN g/dL 14.2 17.1*   HEMATOCRIT % 40.9 50.0*     COVID19   Date Value Ref Range Status   05/01/2022 Not Detected Not Detected - Ref. Range Final     Lab Results   Component Value Date    HGBA1C 5.30 10/21/2022         Pertinent Medications Reviewed.     Current Nutrition Orders & Evaluation of Intake       Oral Nutrition     Current PO Diet Diet: Cardiac Diets; Healthy Heart (2-3 Na+); Texture:  Regular Texture (IDDSI 7); Fluid Consistency: Thin (IDDSI 0)   Supplement No active supplement orders       Malnutrition Severity Assessment            Malnutrition Severity Assessment      Patient meets criteria for : Severe Malnutrition  Malnutrition Type (last 8 hours)     Malnutrition Severity Assessment     Row Name 04/17/23 1410       Malnutrition Severity Assessment    Malnutrition Type Chronic Disease - Related Malnutrition    Row Name 04/17/23 1410       Unintentional Weight Loss     Unintentional Weight Loss Findings Severe    Unintentional Weight Loss  Weight loss greater than 10% in six months    Row Name 04/17/23 1410       Muscle Loss    Loss of Muscle Mass Findings Severe    Caodaism Region Severe - deep hollowing/scooping, lack of muscle to touch, facial bones well defined    Clavicle Bone Region Severe - protruding prominent bone    Acromion Bone Region Severe - squared shoulders, bones, and acromion process protrusion prominent    Scapular Bone Region Severe - prominent bones, depressions easily visible between ribs, scapula, spine, shoulders    Patellar Region Moderate - patella more prominent, less muscle definition around patella    Anterior Thigh Region Severe - line/depression along thigh, obviously thin    Posterior Calf Region Moderate - some roundness, slight firmness    Row Name 04/17/23 1410       Fat Loss    Subcutaneous Fat Loss Findings Severe    Orbital Region  Severe - pronounced hollowness/depression, dark circles, loose saggy skin    Upper Arm Region Moderate - some fat tissue, not ample    Row Name 04/17/23 1410       Criteria Met (Must meet criteria for severity in at least 2 of these categories: M Wasting, Fat Loss, Fluid, Secondary Signs, Wt. Status, Intake)    Patient meets criteria for  Severe Malnutrition                    Nutrition Diagnosis         Nutrition Dx Problem 1 Severe malnutrition related to increased nutrient needs due to catabolic disease as evidenced by  inadequate energy intake., decreased appetite., unintended wt change., body composition changes., patient report., family report., report of minimal PO intake. and COPD     Nutrition Intervention         Boost Plus vanilla BID (+1080 kcal, 42 g pro)     Medical Nutrition Therapy/Nutrition Education          Learner     Readiness Patient and Family  Acceptance     Method     Response Explanation  Verbalizes understanding     Monitor/Evaluation        Monitor Per protocol, PO intake, Supplement intake, Pertinent labs, Weight, POC/GOC     Nutrition Discharge Plan         To be determined     Electronically signed by:  Jeimy Mae RD  04/17/23 08:48 EDT

## 2023-04-17 NOTE — SIGNIFICANT NOTE
04/17/23 2993   Plan   Plan Met with patient and daughters Pat and Marla.  Patient is confused and unable to answer questions.  Reports lives with Pat that provides ADL's for patient.  Family is agreeable to use a home health agency, with no preference, but refuses inpatient rehab.  Referral sent to [facility].  Agreeable to use [facility] at discharge.  Facesheet verified.  Possible home O2 at discharge and is agreeable to use [facility] if needed.

## 2023-04-17 NOTE — PLAN OF CARE
Goal Outcome Evaluation:   Alert to self only. 3L via n/c, RR 26. Lungs diminished. RRT called related to chest pain. 1 nitro was given. RRT nurse gave pt another nitro. No further c/o chest pain this shift. Pt does complain of pain when taking deep breaths. Was treated per MAR. Will continue with care plan and monitoring.

## 2023-04-17 NOTE — H&P (VIEW-ONLY)
Pulmonary / Critical Care Consult Note      Patient Name: Bessy Leggett  : 1950  MRN: [id number]  Primary Care Physician:  Erich Joya DO  Referring Physician: Dick Mitchell DO  Date of admission: 2023    Subjective   Subjective     Reason for Consult/ Chief Complaint: Acute hypoxic respiratory failure, HCAP, COPD exacerbation      HPI:  Bessy Leggett is a 73 y.o. female with past medical history of COPD not on home oxygen, dementia, depression/anxiety, GERD, chronic low back pain and diabetes who presented to the emergency department with a 4-day history of worsening mental status, cough and shortness of breath.  Patient is a poor historian and PMH is obtained from medical record.  Patient was recently hospitalized on 2023 for GI bleed and COVID.  Patient was discharged home on 2023, however, patient's daughter reports over the last 4 days the patient has become more lethargic with decreased activity.  The patient has also been complaining of left pleuritic chest pain, worsening dyspnea, and productive cough.  Daughter reports patient's oxygen saturations were in the 80s at home on room air and they were using another family numbers supplemental oxygen to treat.    Upon arrival to the emergency department patient was found to be tachycardic at 114 and tachypneic at 35.  She was hypoxic on room air.  Lab work revealed mildly elevated proBNP of 1199, WBC 12.24 and lactate 3.4.  CT chest with contrast revealed no evidence of pulmonary embolus, small left pleural effusion, consolidation in left lower lobe and marked emphysematous changes.  Patient was admitted to hospital service and started on ceftriaxone and doxycycline for HCAP coverage.  Pulmonology then consulted for the above reasons.      Review of Systems  Unable to obtain due to mental status    Personal History     Past Medical History:   Diagnosis Date   • Anxiety 2018    PATIENT REPORTS A LONG HISTORY OF ANXIETY ALONG [facility]  [facility] PHOBIA. SHE HAS BEEN PREVIOUSLY BEEN SEEN BY PSYCH RECENTLY., HER XANAX WAS STOPPED. I WILL REFER THE PATIENT FOR EVALUATION BY MENTAL HEALTH SPECIALIST   • Asthma    • Cataract    • COPD (chronic obstructive pulmonary disease)    • Depression    • GERD (gastroesophageal reflux disease)    • Hypertension    • IBS (irritable bowel syndrome)    • Low back pain        Past Surgical History:   Procedure Laterality Date   • ABDOMINAL SURGERY     • COLONOSCOPY     • ENDOSCOPY N/A 4/5/2023    Procedure: ESOPHAGOGASTRODUODENOSCOPY WITH BIOPSIES;  Surgeon: Shaina Pastrana MD;  Location: [facility] ENDOSCOPY;  Service: Gastroenterology;  Laterality: N/A;  GASTRITIS  HIATAL HERNIA   • GALLBLADDER SURGERY     • HYSTERECTOMY     • VASCULAR SURGERY         Family History: family history includes Cancer in an other family member; Diabetes in an other family member; Heart disease in an other family member; Hypertension in an other family member; Stroke in an other family member. Otherwise pertinent FHx was reviewed and not pertinent to current issue.    Social History:  reports that she has been smoking. She has never used smokeless tobacco. She reports that she does not drink alcohol and does not use drugs.    Home Medications:  Umeclidinium-Vilanterol, albuterol sulfate HFA, aspirin, citalopram, lisinopril, omeprazole, and pravastatin    Allergies:  Allergies   Allergen Reactions   • Penicillins Shortness Of Breath, Itching, Swelling and Rash     Has tolerated ceftriaxone, cefdinir, and cefepime -[facility]   • Buspirone Unknown - High Severity   • Ibuprofen Irritability   • Rofecoxib Unknown - Low Severity   • Sumatriptan Unknown - High Severity   • Tramadol Unknown - High Severity   • Tylenol With Codeine #3 [Acetaminophen-Codeine] Unknown - High Severity       Objective    Objective     Vitals:   Temp:  [97.9 °F (36.6 °C)-98.5 °F (36.9 °C)] 98.5 °F (36.9 °C)  Heart Rate:  [] 82  Resp:  [16-29]  16  BP: (125-169)/(68-97) 141/85  Flow (L/min):  [2-3] 3    Physical Exam:  Vital Signs Reviewed   General: Frail elderly female, Alert, NAD.    HEENT:  PERRL, EOMI.  OP, nares clear, no sinus tenderness  Neck:  Supple, no JVD, no thyromegaly  Lymph: no axillary, cervical, supraclavicular lymphadenopathy noted bilaterally  Chest:  good aeration, diminished with rhonchi at left base with bilateral wheezing, tympanic to percussion bilaterally, no work of breathing noted  CV: RRR, no MGR, pulses 2+, equal.  Abd:  Soft, NT, ND, + BS, no HSM  EXT:  no clubbing, no cyanosis, no edema, no joint tenderness  Neuro:  A&Ox3, CN grossly intact, no focal deficits.  Skin: No rashes or lesions noted      Result Review    Result Review:  I have personally reviewed the results from the time of this admission to 4/17/2023 16:17 EDT and agree with these findings:  [x]  Laboratory  [x]  Microbiology  [x]  Radiology  []  EKG/Telemetry   [x]  Cardiology/Vascular   []  Pathology  []  Old records  []  Other:  Most notable findings include:    proBNP 1199  Lactate 1.7        Lab 04/17/23  1255 04/17/23  0200 04/16/23  1338   WBC  --  6.09 12.24*   HEMOGLOBIN 13.2 14.2 17.1*   HEMATOCRIT 38.1 40.9 50.0*   PLATELETS  --  247 292   SODIUM 135* 135* 136   POTASSIUM 3.7 3.7 3.5   CHLORIDE 103 101 97*   CO2 22.0 24.8 24.8   BUN 11 13 11   CREATININE 0.73 0.80 0.96   GLUCOSE 93 181* 131*   CALCIUM 9.0 9.1 10.0   TOTAL PROTEIN 5.4*  --  7.2   ALBUMIN 3.4*  --  4.0   GLOBULIN 2.0  --  3.2     CT Chest With Contrast Diagnostic    Result Date: 4/16/2023  PROCEDURE: CT CHEST W CONTRAST DIAGNOSTIC  COMPARISON:  [facility], CR, XR CHEST 1 VW, 12/17/2021, 3:38.  [facility], CR, XR CHEST 1 VW, 5/01/2022, 13:07.  [facility], CR, XR CHEST 1 VW, 4/16/2023, 13:29.  [facility], CT, CHEST W/O CONTRAST, 11/09/2011, 14:11.  [facility], CT, CT ABDOMEN PELVIS W CONTRAST, 4/04/2023, 18:53.  INDICATIONS: Shortness of breath and tachycardia  TECHNIQUE: After obtaining the patient's consent, CT images were obtained with non-ionic intravenous contrast material.   PROTOCOL:   Pulmonary embolism imaging protocol performed    RADIATION:   DLP: 244.4mGy*cm   Automated exposure control was utilized to minimize radiation dose. CONTRAST: 68cc Isovue 370 I.V. LABS:   eGFR: >60ml/min/1.73m2  FINDINGS:  No pulmonary embolism is identified.  A small left pleural effusion is noted.  A small pericardial effusion is evident.  No adenopathy is identified.  Consolidation is noted in the left lower lobe.  There is a prominent bulla noted in the medial aspect of the left lower lobe.  Marked emphysematous changes are noted.  There is an incompletely imaged fusiform abdominal aortic aneurysm measuring at least 5.1 cm AP.  A bifurcated stent graft has been placed.  A cholecystectomy has been performed.  In the right hepatic lobe on image 267 is a low-density mass consistent with a cyst.  In the left hepatic lobe at the same level is a 0.8 cm hypodense focus, suspected to represent a cyst but too small for definitive characterization.  There is moderate to severe atrophy of the left kidney.  There are suspected bilateral renal cysts.  No focal osseous lesion is seen.      Impression:   1. No evidence of pulmonary embolism 2. Small left pleural and pericardial effusions 3. Consolidation in the left lower lobe may represent atelectasis or pneumonia. 4. Abdominal aortic aneurysm, incompletely imaged on this examination 5. Previous cholecystectomy 6. Moderate to severe atrophy of the left kidney 7. Marked emphysematous changes     Jermaine Biggs M.D.       Electronically Signed and Approved By: Jermaine Biggs M.D. on 4/16/2023 at 17:43                 Assessment & Plan   Assessment / Plan     Active Hospital Problems:  Active Hospital Problems    Diagnosis    • **Acute respiratory failure with hypoxia    • Severe Malnutrition (HCC)       Impression:  Acute hypoxic respiratory failure  Healthcare associat nosocomial pneumonia versus postobstructive pneumonia left lower lobe from unspecified organism  Mucous plugging versus endobronchial lesion left lower lobe  Question aspiration and airways  Lactic acidosis  Sepsis  Acute COPD exacerbation  Chronic dementia  Ongoing tobacco use of cigarettes     Plan:  -Currently on 3 L per nasal cannula, continue to wean to maintain SPO2 greater than 90%  -Start Brovana, Pulmicort, Yupelri and albuterol nebulizers  -Start bronchopulmonary hygiene  -Discussed with family at the bedside that chest he shows possible postobstructive pneumonia related to mucous plug versus foreign body versus possible endobronchial lesion given her smoking history  -Will take for bronchoscopy with airway inspection, brushings, biopsies, bronchoalveolar lavage. I have discussed the risks of the procedure with the patient including pneumothorax, hemothorax, bleeding, hypoxia, required mechanical ventilation and death. The patient recognizes these findings, acknowledges these findings and is agreeable to the procedure.  -N.p.o. after midnight for bronchoscopy  -DC vancomycin as MRSA PCR is negative.  On cefepime day 1.  De-escalate based off cultures  -Continue IV steroids  -Patient's family is requested neurology evaluation while inpatient as she has a pending work-up for dementia.  Defer this to hospitalist  -Sputum culture pending  -Encourage I-S/flutter valve  -PT on board  -Encourage mobilization, out of bed to chair    DVT prophylaxis:  Medical DVT prophylaxis orders are present.     Code Status and Medical Interventions:   Ordered at: 04/16/23 2297     Level Of Support Discussed With:    Health Care Surrogate    Next of Kin (If No Surrogate)     Code Status (Patient has no pulse and is not breathing):    CPR (Attempt to Resuscitate)     Medical Interventions (Patient has pulse or is breathing):    Full Support        Labs,  imaging, microbiology, notes and medications personally reviewed  Discussed with primary    Thank you for involving me in the care of this patient.    Electronically signed by ROSANGELA Camarillo, 04/17/23, 4:17 PM EDT.    I, Dr. Cabrera Obando, have spent more than 50% of the total time managing the patient in this encounter today.  This included personally reviewing all pertinent labs, imaging, microbiology and documentation. Also discussing the case with the patient and any available family, the admitting physician and any available ancillary staff.    Electronically signed by Cabrera Obando MD, 04/17/23, 4:28 PM EDT.

## 2023-04-17 NOTE — PROGRESS NOTES
" [facility]   Hospitalist Progress Note  Date: 2023  Patient Name: Bessy Leggett  : 1950  MRN: [id number]  Date of admission: 2023      Subjective   Subjective     Summary: 73 y.o. female past medical history of depression/anxiety, COPD not on home oxygen, GERD, hypertension, IBS, chronic low back pain, dementia who presents to the ER due to 4-day history of worsening mental status, cough and shortness of breath.  Patient is a poor historian and unable to provide clear details about her history.  History supplemented by discussion with daughter at the bedside as well as discussion with ER staff.  Chart review shows patient was recently hospitalized here 2 weeks ago for GI bleed and COVID. EGD at that time showed normal esophagus with a small hiatal hernia and diffuse inflammation characterized by erythema and granularity in the antrum where biopsies were taken.  Patient at that time also had a UTI that was treated with antibiotics.  She was discharged home in stable condition however since being home over the last 4 days her daughter noticed her to be more lethargic with minimal activity.  In addition she has had intermittent spells of \"shaking\" that do not seem to be seizure type activity according to the daughter.  Patient has also been complaining of a pleuritic chest pain in the left chest around her breast as well as some dyspnea.  This has been associated with a productive cough over the last 2 days.  Her daughter has also noted oxygen saturations in the 80s at home for which she has been using another family member's oxygen to treat her.  Ultimately her dyspnea progressed significantly today and the daughter decided to bring her into the ER for evaluation     Upon arrival here patient was tachycardic to 114 and tachypneic to 35.  She was hypoxic on room air.  Lab work revealed a mildly elevated proBNP of 1199, leukocytosis of 12,000, elevated hemoglobin of 17.  CTA was personally " reviewed and did not reveal evidence of a pulmonary embolism but did reveal left pleural effusion as well as consolidation of the left lower lobe as well as marked emphysematous changes.EKG personally reviewed shows sinus tachycardia, borderline prolonged [place] interval and prolonged QTc with poor progression anteriorly, PACs and biatrial enlargement.  Blood cultures were drawn and patient started on Rocephin and doxycycline for suspected pneumonia.  Hospitalist service was then contacted for admission.    Interval Followup: She had an episode of chest pain today and had an RRT called.  Repeat lab and EKG was obtained and not significantly changed.  Chest pain improved with a dose of nitroglycerin.  Cardiology was consulted.  I reviewed her CT of the chest which was concerning for possible need for bronchoscopy.  Discussed with pulmonary who will consult on the case and consider bronchoscopy      Objective   Objective     Vitals:   Temp:  [97.9 °F (36.6 °C)-98.5 °F (36.9 °C)] 98.5 °F (36.9 °C)  Heart Rate:  [] 94  Resp:  [18-36] 18  BP: (121-169)/(83-97) 168/91  Flow (L/min):  [2-3] 3  Physical Exam    Constitutional: Awake, somnolent, no acute distress   Respiratory: Diminished breath sounds to auscultation bilaterally, nonlabored respirations    Cardiovascular: RRR   Gastrointestinal: Positive bowel sounds, soft, nontender, nondistended   Neurologic: Oriented x 3, strength symmetric in all extremities, Cranial Nerves grossly intact to confrontation, speech clear   Skin: No rashes       Assessment & Plan   Assessment / Plan     Assessment/Plan:  • Acute hypoxic respiratory failure secondary to HCAP and COPD exacerbation  • Sepsis 2/2 Healthcare associated pneumonia  • Acute metabolic encephalopathy secondary to above  • Lactic acidosis likely secondary to sepsis above  • Prolonged QTc  • Elevated troponin likely type II MI secondary to demand ischemia from above  • Polycythemia, likely secondary to chronic  hypoxia  • Suspected dementia  • Infrarenal abdominal aortic aneurysm, 5.6 cm status post prior graft repair  • COPD     Plan  · Remain admitted to telemetry on the hospitalist service  · We will continue broad-spectrum antibiotics with vancomycin and cefepime  · Sputum cultures and bronchopulmonary hygiene ordered  · Consult pulmonary to consider bronchoscopy due to apparent mucous plugging of the left lower lobe  · Follow-up blood cultures  · Continue scheduled nebulizers, will start Solu-Medrol 40 mg twice daily for COPD exacerbation  · Continuous pulse ox and telemetry ordered for closer monitoring of patient.  · Procalcitonin low  · Follow-up blood cultures.  Continue to review labs daily  · Consult cardiology.  Discussed with Dr. Damion Layne  · Avoid QTc prolonging medications, repeat high-sensitivity troponin on schedule  · Patient has outpatient follow-up with neurology later this month for suspected dementia, advised to keep appointment  · Advised vascular surgery follow-up for known infrarenal AAA  · All labs, imaging and EKG personally reviewed, findings as described above      Discussed plan with RN.    DVT prophylaxis:  Medical DVT prophylaxis orders are present.    CODE STATUS:   Level Of Support Discussed With: Health Care Surrogate; Next of Kin (If No Surrogate)  Code Status (Patient has no pulse and is not breathing): CPR (Attempt to Resuscitate)  Medical Interventions (Patient has pulse or is breathing): Full Support

## 2023-04-17 NOTE — CODE DOCUMENTATION
RRT called related to chest pain. Reported that 1 NTG had been given prior to my arrival. Patient given a second NTG as per MAR. Patient now reporting relief of pain.

## 2023-04-17 NOTE — SIGNIFICANT NOTE
04/17/23 1030   Coping/Psychosocial   Observed Emotional State calm;cooperative   Verbalized Emotional State hopefulness   Trust Relationship/Rapport empathic listening provided   Family/Support Persons daughter   Involvement in Care interacting with patient   Additional Documentation Spiritual Care (Group)   Spiritual Care   Use of Spiritual Resources non-Anglican use of spiritual care   Spiritual Care Source  initiative   Spiritual Care Follow-Up follow-up, none required as presently assessed   Response to Spiritual Care receptive of support   Spiritual Care Interventions supportive conversation provided   Spiritual Care Visit Type initial   Receptivity to Spiritual Care visit welcomed

## 2023-04-17 NOTE — PROGRESS NOTES
"[facility] Clinical Pharmacy Services: Vancomycin Pharmacokinetic Initial Consult Note    Bessy Leggett is a 73 y.o. female who is on day 1 of pharmacy to dose vancomycin for Pneumonia.    Consult Information  Consulting Provider: Dr. Jane  Planned Duration of Therapy: 7 days   Was Patient Receiving Prior to Admission/Consult?: No  Loading Dose Ordered or Given: 1000 mg on    PK/PD Target: -600 mg/L.hr   Other Antimicrobials: Cefepime    Imaging Reviewed?: Yes    Microbiology Data  MRSA PCR performed: 23; Result: Pending  Culture/Source:       Vitals/Labs  Ht: 157.5 cm (62\"); Wt: 49.4 kg (108 lb 14.5 oz)  Temp (24hrs), Av.2 °F (36.8 °C), Min:98.1 °F (36.7 °C), Max:98.2 °F (36.8 °C)   Estimated Creatinine Clearance: 41.3 mL/min (by C-G formula based on SCr of 0.96 mg/dL).        Results from last 7 days   Lab Units 23  1338   CREATININE mg/dL 0.96   WBC 10*3/mm3 12.24*     Assessment/Plan:    Vancomycin Dose:  1000 mg IV every 24 hours; which provides the following predicted parameters:  AUC24,ss: 466 mg/L.hr  PAUC*: 69 %  Ctrough,ss: 13.6 mg/L  Pconc*: 13 %  Tox.: 9 %  Vanc Random ordered for  at 0600  Patient has order for Basic Metabolic Panel    Pharmacy will follow patient's kidney function and will adjust doses and obtain levels as necessary. Thank you for involving pharmacy in this patient's care. Please contact pharmacy with any questions or concerns.                           Pepper Beck  Clinical Pharmacist    "

## 2023-04-17 NOTE — CONSULTS
Date of service: 4/17/2023    Reason for consultation: Chest pain    HPI: A 73-year-old female has multiple medical problems as will be stated below.  The patient has dementia and her medical history history was obtained from Dr. Reveles, her daughter and medical records.  Her daughter stated that the chest pain started in the mid sternum and went around to underneath her left breast.  It got worse after taking a deep breath.  She she also has been coughing.  She was diagnosed with left lower lobe pneumonia.  There is no history of falling down or chest trauma.    Review of systems: Unobtainable other than hurting all over.    Past medical surgical history:    1.  Hypertension  2.  COPD  3.  Depression and anxiety  4.  Infrarenal abdominal aortic aneurysm repair  5.  GERD and irritable bowel syndrome  6.  Chronic back pain  7.  Other surgeries and procedures: Hysterectomy, cystectomy, EGD and colonoscopy.    Medications: See the patient's medication list    Allergies: Ibuprofen, tramadol, Tylenol with codeine, penicillins, buspirone and sumatriptan.    Social history: A former smoker.  No alcohol or illicit drug use.    Family history: Heart disease and DM (specifics are unknown).    Physical examination: She is seen by awake and arousable.  Vital signs: Reviewed  Neck: No JVD or carotid bruit  Chest: Poor air entry.  Clear to auscultation.  Bilateral chest wall tenderness on palpation.  Cardiac: RRR.  No gallop or murmurs  Abdomen: Soft, tender all over.  No megalies or masses.  Extremities: No edema, cyanosis or clubbing.  Pulse intact    Records: Reviewed.    Assessment and plan:    1.  Pleuritic type chest pain and or musculoskeletal pain.  2.  Elevated HS troponin T, mostly due to demand ischemia..    3.  Acute hypoxemic respiratory failure  4.  Sepsis and pneumonia  5.  We will obtain CK-MB on the first sample on admission.  Rule out acute MI  6.  Status of repair of AAA.  7.  Treat her medical problems per the  primary service.  8.  We will see the patient again in the a.m. for reevaluation. We may have a reasonable medical history directly from here.

## 2023-04-18 ENCOUNTER — ANESTHESIA EVENT (OUTPATIENT)
Dept: GASTROENTEROLOGY | Facility: HOSPITAL | Age: 73
End: 2023-04-18
Payer: MEDICARE

## 2023-04-18 ENCOUNTER — ANESTHESIA (OUTPATIENT)
Dept: GASTROENTEROLOGY | Facility: HOSPITAL | Age: 73
End: 2023-04-18
Payer: MEDICARE

## 2023-04-18 ENCOUNTER — TELEPHONE (OUTPATIENT)
Dept: NEUROLOGY | Facility: CLINIC | Age: 73
End: 2023-04-18
Payer: MEDICARE

## 2023-04-18 PROBLEM — J18.9 PNEUMONIA DUE TO INFECTIOUS ORGANISM: Status: ACTIVE | Noted: 2023-04-16

## 2023-04-18 PROBLEM — T17.500A MUCUS PLUGGING OF BRONCHI: Status: ACTIVE | Noted: 2023-04-16

## 2023-04-18 LAB
ACB CMPLX DNA BAL NAA+NON-PRB-NCNCRNG: NOT DETECTED
ANION GAP SERPL CALCULATED.3IONS-SCNC: 8.3 MMOL/L (ref 5–15)
BASOPHILS # BLD AUTO: 0.02 10*3/MM3 (ref 0–0.2)
BASOPHILS NFR BLD AUTO: 0.2 % (ref 0–1.5)
BLACTX-M ISLT/SPM QL: NORMAL
BLAIMP ISLT/SPM QL: NORMAL
BLAKPC ISLT/SPM QL: NORMAL
BLAOXA-48-LIKE ISLT/SPM QL: NORMAL
BLAVIM ISLT/SPM QL: NORMAL
BUN SERPL-MCNC: 10 MG/DL (ref 8–23)
BUN/CREAT SERPL: 12 (ref 7–25)
C PNEUM DNA NPH QL NAA+NON-PROBE: NOT DETECTED
CALCIUM SPEC-SCNC: 8.9 MG/DL (ref 8.6–10.5)
CHLORIDE SERPL-SCNC: 105 MMOL/L (ref 98–107)
CILIATED BAL QL: 6 %
CO2 SERPL-SCNC: 26.7 MMOL/L (ref 22–29)
CREAT SERPL-MCNC: 0.83 MG/DL (ref 0.57–1)
DEPRECATED RDW RBC AUTO: 44.5 FL (ref 37–54)
E CLOAC COMP DNA BAL NAA+NON-PRB-NCNCRNG: NOT DETECTED
E COLI DNA BAL NAA+NON-PRB-NCNCRNG: NOT DETECTED
EGFRCR SERPLBLD CKD-EPI 2021: 75 ML/MIN/1.73
EOSINOPHIL # BLD AUTO: 0.05 10*3/MM3 (ref 0–0.4)
EOSINOPHIL NFR BLD AUTO: 0.6 % (ref 0.3–6.2)
ERYTHROCYTE [DISTWIDTH] IN BLOOD BY AUTOMATED COUNT: 13 % (ref 12.3–15.4)
FLUAV SUBTYP SPEC NAA+PROBE: NOT DETECTED
FLUBV RNA ISLT QL NAA+PROBE: NOT DETECTED
GEN 5 2HR TROPONIN T REFLEX: 19 NG/L
GLUCOSE SERPL-MCNC: 107 MG/DL (ref 65–99)
GP B STREP DNA BAL NAA+NON-PRB-NCNCRNG: NOT DETECTED
HADV DNA SPEC NAA+PROBE: NOT DETECTED
HAEM INFLU DNA BAL NAA+NON-PRB-NCNCRNG: NOT DETECTED
HCOV RNA LOWER RESP QL NAA+NON-PROBE: NOT DETECTED
HCT VFR BLD AUTO: 39 % (ref 34–46.6)
HGB BLD-MCNC: 13.3 G/DL (ref 12–15.9)
HMPV RNA NPH QL NAA+NON-PROBE: NOT DETECTED
HPIV RNA LOWER RESP QL NAA+NON-PROBE: NOT DETECTED
IMM GRANULOCYTES # BLD AUTO: 0.03 10*3/MM3 (ref 0–0.05)
IMM GRANULOCYTES NFR BLD AUTO: 0.4 % (ref 0–0.5)
K AEROGENES DNA BAL NAA+NON-PRB-NCNCRNG: NOT DETECTED
K OXYTOCA DNA BAL NAA+NON-PRB-NCNCRNG: NOT DETECTED
K PNEU GRP DNA BAL NAA+NON-PRB-NCNCRNG: NOT DETECTED
L PNEUMO DNA LOWER RESP QL NAA+NON-PROBE: NOT DETECTED
LYMPHOCYTES # BLD AUTO: 1.67 10*3/MM3 (ref 0.7–3.1)
LYMPHOCYTES NFR BLD AUTO: 20 % (ref 19.6–45.3)
LYMPHOCYTES NFR FLD MANUAL: 8 %
M CATARRHALIS DNA BAL NAA+NON-PRB-NCNCRNG: NOT DETECTED
M PNEUMO IGG SER IA-ACNC: NOT DETECTED
MACROPHAGE FLUID: 6 %
MCH RBC QN AUTO: 31.8 PG (ref 26.6–33)
MCHC RBC AUTO-ENTMCNC: 34.1 G/DL (ref 31.5–35.7)
MCV RBC AUTO: 93.3 FL (ref 79–97)
MECA+MECC ISLT/SPM QL: NORMAL
MONOCYTES # BLD AUTO: 0.64 10*3/MM3 (ref 0.1–0.9)
MONOCYTES NFR BLD AUTO: 7.7 % (ref 5–12)
NDM GENE: NORMAL
NEUTROPHILS NFR BLD AUTO: 5.94 10*3/MM3 (ref 1.7–7)
NEUTROPHILS NFR BLD AUTO: 71.1 % (ref 42.7–76)
NEUTROPHILS NFR FLD MANUAL: 80 %
NRBC BLD AUTO-RTO: 0 /100 WBC (ref 0–0.2)
P AERUGINOSA DNA BAL NAA+NON-PRB-NCNCRNG: NOT DETECTED
PLATELET # BLD AUTO: 231 10*3/MM3 (ref 140–450)
PMV BLD AUTO: 8.5 FL (ref 6–12)
POTASSIUM SERPL-SCNC: 3.9 MMOL/L (ref 3.5–5.2)
PROTEUS SP DNA BAL NAA+NON-PRB-NCNCRNG: NOT DETECTED
RBC # BLD AUTO: 4.18 10*6/MM3 (ref 3.77–5.28)
RHINOVIRUS RNA SPEC NAA+PROBE: NOT DETECTED
RSV RNA NPH QL NAA+NON-PROBE: NOT DETECTED
S AUREUS DNA BAL NAA+NON-PRB-NCNCRNG: NOT DETECTED
S MARCESCENS DNA BAL NAA+NON-PRB-NCNCRNG: NOT DETECTED
S PNEUM DNA BAL NAA+NON-PRB-NCNCRNG: NOT DETECTED
S PYO DNA BAL NAA+NON-PRB-NCNCRNG: NOT DETECTED
SODIUM SERPL-SCNC: 140 MMOL/L (ref 136–145)
TROPONIN T DELTA: 0 NG/L
TROPONIN T SERPL HS-MCNC: 19 NG/L
VISUAL PRESENCE OF BLOOD: NORMAL
WBC NRBC COR # BLD: 8.35 10*3/MM3 (ref 3.4–10.8)

## 2023-04-18 PROCEDURE — 89051 BODY FLUID CELL COUNT: CPT | Performed by: INTERNAL MEDICINE

## 2023-04-18 PROCEDURE — 63710000001 PREDNISONE PER 1 MG: Performed by: INTERNAL MEDICINE

## 2023-04-18 PROCEDURE — 99233 SBSQ HOSP IP/OBS HIGH 50: CPT | Performed by: INTERNAL MEDICINE

## 2023-04-18 PROCEDURE — 63710000001 REVEFENACIN 175 MCG/3ML SOLUTION: Performed by: INTERNAL MEDICINE

## 2023-04-18 PROCEDURE — 87102 FUNGUS ISOLATION CULTURE: CPT | Performed by: INTERNAL MEDICINE

## 2023-04-18 PROCEDURE — 87633 RESP VIRUS 12-25 TARGETS: CPT | Performed by: INTERNAL MEDICINE

## 2023-04-18 PROCEDURE — 87205 SMEAR GRAM STAIN: CPT | Performed by: INTERNAL MEDICINE

## 2023-04-18 PROCEDURE — 85025 COMPLETE CBC W/AUTO DIFF WBC: CPT | Performed by: FAMILY MEDICINE

## 2023-04-18 PROCEDURE — 84484 ASSAY OF TROPONIN QUANT: CPT | Performed by: FAMILY MEDICINE

## 2023-04-18 PROCEDURE — 31645 BRNCHSC W/THER ASPIR 1ST: CPT | Performed by: INTERNAL MEDICINE

## 2023-04-18 PROCEDURE — 25010000002 PROPOFOL 10 MG/ML EMULSION: Performed by: NURSE ANESTHETIST, CERTIFIED REGISTERED

## 2023-04-18 PROCEDURE — 31624 DX BRONCHOSCOPE/LAVAGE: CPT | Performed by: INTERNAL MEDICINE

## 2023-04-18 PROCEDURE — 87116 MYCOBACTERIA CULTURE: CPT | Performed by: INTERNAL MEDICINE

## 2023-04-18 PROCEDURE — 0BCB8ZZ EXTIRPATION OF MATTER FROM LEFT LOWER LOBE BRONCHUS, VIA NATURAL OR ARTIFICIAL OPENING ENDOSCOPIC: ICD-10-PCS | Performed by: INTERNAL MEDICINE

## 2023-04-18 PROCEDURE — 80048 BASIC METABOLIC PNL TOTAL CA: CPT | Performed by: FAMILY MEDICINE

## 2023-04-18 PROCEDURE — 87071 CULTURE AEROBIC QUANT OTHER: CPT | Performed by: INTERNAL MEDICINE

## 2023-04-18 PROCEDURE — 94668 MNPJ CHEST WALL SBSQ: CPT

## 2023-04-18 PROCEDURE — 87206 SMEAR FLUORESCENT/ACID STAI: CPT | Performed by: INTERNAL MEDICINE

## 2023-04-18 PROCEDURE — 88108 CYTOPATH CONCENTRATE TECH: CPT | Performed by: INTERNAL MEDICINE

## 2023-04-18 PROCEDURE — 94799 UNLISTED PULMONARY SVC/PX: CPT

## 2023-04-18 PROCEDURE — 99233 SBSQ HOSP IP/OBS HIGH 50: CPT | Performed by: FAMILY MEDICINE

## 2023-04-18 PROCEDURE — 94760 N-INVAS EAR/PLS OXIMETRY 1: CPT

## 2023-04-18 PROCEDURE — 87070 CULTURE OTHR SPECIMN AEROBIC: CPT | Performed by: INTERNAL MEDICINE

## 2023-04-18 PROCEDURE — 0B9J8ZX DRAINAGE OF LEFT LOWER LUNG LOBE, VIA NATURAL OR ARTIFICIAL OPENING ENDOSCOPIC, DIAGNOSTIC: ICD-10-PCS | Performed by: INTERNAL MEDICINE

## 2023-04-18 PROCEDURE — 25010000002 CEFEPIME PER 500 MG: Performed by: INTERNAL MEDICINE

## 2023-04-18 RX ORDER — HYDROXYZINE HYDROCHLORIDE 25 MG/1
25 TABLET, FILM COATED ORAL ONCE AS NEEDED
Status: COMPLETED | OUTPATIENT
Start: 2023-04-18 | End: 2023-04-18

## 2023-04-18 RX ORDER — LIDOCAINE HYDROCHLORIDE 40 MG/ML
INJECTION, SOLUTION RETROBULBAR; TOPICAL AS NEEDED
Status: DISCONTINUED | OUTPATIENT
Start: 2023-04-18 | End: 2023-04-18 | Stop reason: HOSPADM

## 2023-04-18 RX ORDER — SODIUM CHLORIDE 0.9 % (FLUSH) 0.9 %
10 SYRINGE (ML) INJECTION AS NEEDED
Status: DISCONTINUED | OUTPATIENT
Start: 2023-04-18 | End: 2023-04-18

## 2023-04-18 RX ORDER — SODIUM CHLORIDE 9 MG/ML
40 INJECTION, SOLUTION INTRAVENOUS AS NEEDED
Status: DISCONTINUED | OUTPATIENT
Start: 2023-04-18 | End: 2023-04-18

## 2023-04-18 RX ORDER — SODIUM CHLORIDE 0.9 % (FLUSH) 0.9 %
10 SYRINGE (ML) INJECTION EVERY 12 HOURS SCHEDULED
Status: DISCONTINUED | OUTPATIENT
Start: 2023-04-18 | End: 2023-04-18

## 2023-04-18 RX ORDER — MAGNESIUM HYDROXIDE 1200 MG/15ML
LIQUID ORAL AS NEEDED
Status: DISCONTINUED | OUTPATIENT
Start: 2023-04-18 | End: 2023-04-18 | Stop reason: HOSPADM

## 2023-04-18 RX ORDER — LIDOCAINE HYDROCHLORIDE 20 MG/ML
INJECTION, SOLUTION EPIDURAL; INFILTRATION; INTRACAUDAL; PERINEURAL AS NEEDED
Status: DISCONTINUED | OUTPATIENT
Start: 2023-04-18 | End: 2023-04-18 | Stop reason: SURG

## 2023-04-18 RX ORDER — PREDNISONE 20 MG/1
40 TABLET ORAL
Status: DISCONTINUED | OUTPATIENT
Start: 2023-04-18 | End: 2023-04-20

## 2023-04-18 RX ORDER — SODIUM CHLORIDE, SODIUM LACTATE, POTASSIUM CHLORIDE, CALCIUM CHLORIDE 600; 310; 30; 20 MG/100ML; MG/100ML; MG/100ML; MG/100ML
9 INJECTION, SOLUTION INTRAVENOUS CONTINUOUS PRN
Status: DISCONTINUED | OUTPATIENT
Start: 2023-04-18 | End: 2023-04-18

## 2023-04-18 RX ORDER — PROPOFOL 10 MG/ML
VIAL (ML) INTRAVENOUS AS NEEDED
Status: DISCONTINUED | OUTPATIENT
Start: 2023-04-18 | End: 2023-04-18 | Stop reason: SURG

## 2023-04-18 RX ADMIN — PROPOFOL 175 MCG/KG/MIN: 10 INJECTION, EMULSION INTRAVENOUS at 11:12

## 2023-04-18 RX ADMIN — Medication 4 ML: at 18:44

## 2023-04-18 RX ADMIN — Medication 5 MG: at 21:10

## 2023-04-18 RX ADMIN — Medication 4 ML: at 07:05

## 2023-04-18 RX ADMIN — CEFEPIME HYDROCHLORIDE 2 G: 2 INJECTION, POWDER, FOR SOLUTION INTRAMUSCULAR; INTRAVENOUS at 21:10

## 2023-04-18 RX ADMIN — ARFORMOTEROL TARTRATE 15 MCG: 15 SOLUTION RESPIRATORY (INHALATION) at 18:44

## 2023-04-18 RX ADMIN — HYDROXYZINE HYDROCHLORIDE 25 MG: 25 TABLET, FILM COATED ORAL at 21:30

## 2023-04-18 RX ADMIN — SODIUM CHLORIDE, POTASSIUM CHLORIDE, SODIUM LACTATE AND CALCIUM CHLORIDE 9 ML/HR: 600; 310; 30; 20 INJECTION, SOLUTION INTRAVENOUS at 10:57

## 2023-04-18 RX ADMIN — CEFEPIME HYDROCHLORIDE 2 G: 2 INJECTION, POWDER, FOR SOLUTION INTRAMUSCULAR; INTRAVENOUS at 12:25

## 2023-04-18 RX ADMIN — PREDNISONE 40 MG: 20 TABLET ORAL at 08:38

## 2023-04-18 RX ADMIN — ALPRAZOLAM 0.25 MG: 0.25 TABLET ORAL at 09:20

## 2023-04-18 RX ADMIN — ARFORMOTEROL TARTRATE 15 MCG: 15 SOLUTION RESPIRATORY (INHALATION) at 07:05

## 2023-04-18 RX ADMIN — LIDOCAINE HYDROCHLORIDE 100 MG: 20 INJECTION, SOLUTION EPIDURAL; INFILTRATION; INTRACAUDAL; PERINEURAL at 11:12

## 2023-04-18 RX ADMIN — PROPOFOL 50 MG: 10 INJECTION, EMULSION INTRAVENOUS at 11:12

## 2023-04-18 RX ADMIN — ALPRAZOLAM 0.25 MG: 0.25 TABLET ORAL at 19:27

## 2023-04-18 RX ADMIN — PRAVASTATIN SODIUM 40 MG: 40 TABLET ORAL at 19:27

## 2023-04-18 RX ADMIN — BUDESONIDE 0.5 MG: 0.5 SUSPENSION RESPIRATORY (INHALATION) at 07:05

## 2023-04-18 RX ADMIN — REVEFENACIN 175 MCG: 175 SOLUTION RESPIRATORY (INHALATION) at 07:05

## 2023-04-18 RX ADMIN — LISINOPRIL 20 MG: 20 TABLET ORAL at 08:38

## 2023-04-18 RX ADMIN — Medication 10 ML: at 08:38

## 2023-04-18 RX ADMIN — BUDESONIDE 0.5 MG: 0.5 SUSPENSION RESPIRATORY (INHALATION) at 18:44

## 2023-04-18 NOTE — PROGRESS NOTES
" [facility]   Hospitalist Progress Note  Date: 2023  Patient Name: Bessy Leggett  : 1950  MRN: [id number]  Date of admission: 2023      Subjective   Subjective     Summary: 73 y.o. female past medical history of depression/anxiety, COPD not on home oxygen, GERD, hypertension, IBS, chronic low back pain, dementia who presents to the ER due to 4-day history of worsening mental status, cough and shortness of breath.  Patient is a poor historian and unable to provide clear details about her history.  History supplemented by discussion with daughter at the bedside as well as discussion with ER staff.  Chart review shows patient was recently hospitalized here 2 weeks ago for GI bleed and COVID. EGD at that time showed normal esophagus with a small hiatal hernia and diffuse inflammation characterized by erythema and granularity in the antrum where biopsies were taken.  Patient at that time also had a UTI that was treated with antibiotics.  She was discharged home in stable condition however since being home over the last 4 days her daughter noticed her to be more lethargic with minimal activity.  In addition she has had intermittent spells of \"shaking\" that do not seem to be seizure type activity according to the daughter.  Patient has also been complaining of a pleuritic chest pain in the left chest around her breast as well as some dyspnea.  This has been associated with a productive cough over the last 2 days.  Her daughter has also noted oxygen saturations in the 80s at home for which she has been using another family member's oxygen to treat her.  Ultimately her dyspnea progressed significantly today and the daughter decided to bring her into the ER for evaluation     Upon arrival here patient was tachycardic to 114 and tachypneic to 35.  She was hypoxic on room air.  Lab work revealed a mildly elevated proBNP of 1199, leukocytosis of 12,000, elevated hemoglobin of 17.  CTA was personally " reviewed and did not reveal evidence of a pulmonary embolism but did reveal left pleural effusion as well as consolidation of the left lower lobe as well as marked emphysematous changes.EKG personally reviewed shows sinus tachycardia, borderline prolonged SC interval and prolonged QTc with poor progression anteriorly, PACs and biatrial enlargement.  Blood cultures were drawn and patient started on Rocephin and doxycycline for suspected pneumonia.  Hospitalist service was then contacted for admission.    She had an episode of chest pain on 4/17/23 and had an RRT called.  Repeat lab and EKG was obtained and not significantly changed.  Chest pain improved with a dose of nitroglycerin.  Cardiology was consulted.   I reviewed her CT of the chest which was concerning for possible need for bronchoscopy.  Chest pain was felt to be pulmonary in origin.  Pulmonology was consulted.    Interval Followup: No acute events overnight.  Patient did seem a little better this morning.  She has been coughing.  Remains lethargic with intermittent confusion.  Patient underwent bronchoscopy earlier today.  She had significant mucus plugging with thick secretions which were suctioned out and cultures were sent.  Patient currently resting comfortably 3 L nasal cannula.      Objective   Objective     Vitals:   Temp:  [97.2 °F (36.2 °C)-98.1 °F (36.7 °C)] 97.2 °F (36.2 °C)  Heart Rate:  [74-95] 90  Resp:  [16-22] 20  BP: (135-154)/() 143/88  Flow (L/min):  [2-3] 3  Physical Exam    Constitutional: Awake, somnolent, no acute distress   Respiratory: Diminished breath sounds to auscultation bilaterally, nonlabored respirations    Cardiovascular: RRR   Gastrointestinal: Positive bowel sounds, soft, nontender, nondistended   Neurologic: Oriented x 3, strength symmetric in all extremities, Cranial Nerves grossly intact to confrontation, speech clear   Skin: No rashes       Assessment & Plan   Assessment / Plan     Assessment/Plan:  • Acute  hypoxic respiratory failure secondary to HCAP and COPD exacerbation  • Sepsis 2/2 Healthcare associated pneumonia  • Dense mucous plugging left lower lobe bronchus with thick viscous copious purulent secretions.   • Acute metabolic encephalopathy secondary to above on chronic dementia with amyloid angiopathy  • Lactic acidosis likely secondary to sepsis above  • Prolonged QTc.  Resolved  • Elevated troponin likely type II MI secondary to demand ischemia from above.  Stable  • Polycythemia, likely secondary to chronic hypoxia  • Suspected dementia amyloid angiopathy  • Infrarenal abdominal aortic aneurysm, 5.6 cm status post prior graft repair  • COPD     Plan  · Remain admitted to telemetry on the hospitalist service  · Consult neurology to sort out the plan for the amyloid angiopathy  · We will continue broad-spectrum antibiotics with vancomycin and cefepime  · Sputum cultures and bronchopulmonary hygiene ordered  · Consult pulmonary to consider bronchoscopy due to apparent mucous plugging of the left lower lobe  · Follow-up blood cultures  · Continue scheduled nebulizers  · Continue systemic corticosteroids  · Continuous pulse ox and telemetry ordered for closer monitoring of patient.  · Procalcitonin low  · Follow-up blood cultures.  Continue to review labs daily  · Cardiology following.  Appreciate input  · Consult PT/OT  · Avoid QTc prolonging medications, repeat high-sensitivity troponin on schedule  · Patient has outpatient follow-up with neurology later this month for suspected dementia, advised to keep appointment  · Advised vascular surgery follow-up for known infrarenal AAA  · All labs, imaging and EKG personally reviewed, findings as described above      Discussed plan with RN.    DVT prophylaxis:  Medical DVT prophylaxis orders are present.    CODE STATUS:   Level Of Support Discussed With: Health Care Surrogate; Next of Kin (If No Surrogate)  Code Status (Patient has no pulse and is not breathing): CPR  (Attempt to Resuscitate)  Medical Interventions (Patient has pulse or is breathing): Full Support

## 2023-04-18 NOTE — PLAN OF CARE
Goal Outcome Evaluation:      VSS. UOP; purewick in place. No c/o pain. PRN med given for anxiety; see MAR. Bronchoscopy today; tolerated well. A&Ox2; intermittent confusion to place/situation--able to be re-oriented with some success. Daughter at bedside and involved in patient's care. Alisia Reece RN      Progress: improving

## 2023-04-18 NOTE — ANESTHESIA POSTPROCEDURE EVALUATION
Patient: Bessy Leggett    Procedure Summary     Date: 04/18/23 Room / Location: [facility] ENDOSCOPY 3 / [facility] ENDOSCOPY    Anesthesia Start: 1107 Anesthesia Stop: 1134    Procedure: BRONCHOSCOPY WITH BRONCHIOALVEOLAR LAVAGE/WASHINGS (Bronchus) Diagnosis:       COPD exacerbation      Acute respiratory failure with hypoxia      Pneumonia due to infectious organism, unspecified laterality, unspecified part of lung      Mucus plugging of bronchi      (COPD exacerbation [J44.1])      (Acute respiratory failure with hypoxia [J96.01])      (Pneumonia due to infectious organism, unspecified laterality, unspecified part of lung [J18.9])      (Mucus plugging of bronchi [T17.500A])    Surgeons: Cabrera Obando MD Provider: Tramaine Blancas MD    Anesthesia Type: general ASA Status: 4          Anesthesia Type: general    Vitals  Vitals Value Taken Time   /84 04/18/23 1152   Temp 36.4 °C (97.5 °F) 04/18/23 1132   Pulse 91 04/18/23 1155   Resp 20 04/18/23 1152   SpO2 91 % 04/18/23 1154   Vitals shown include unvalidated device data.        Post Anesthesia Care and Evaluation    Patient location during evaluation: bedside  Patient participation: complete - patient participated  Level of consciousness: awake  Pain management: adequate    Airway patency: patent  Anesthetic complications: No anesthetic complications  PONV Status: controlled  Cardiovascular status: acceptable and stable  Respiratory status: acceptable

## 2023-04-18 NOTE — PLAN OF CARE
Goal Outcome Evaluation:  Plan of Care Reviewed With: daughter           Outcome Evaluation: Pt continues to be confused to time/situation. Rested off/on during the night. NPO since midnight for bronch this am. Cont POC

## 2023-04-18 NOTE — PROGRESS NOTES
Pulmonary / Critical Care Progress Note      Patient Name: Bessy Leggett  : 1950  MRN: [id number]  Attending:  Dick Mitchell DO  Date of admission: 2023    Subjective   Subjective   Follow-up for pneumonia     Over past 24 hours:  No change  Still dry hacking cough. Still short of breath and wheezy  Agreeable for bronch today  Cultures so far negative  Weak and fatigued   On 2LPM  No N/V/F/C      Review of Systems  General: fatigue and weakness, otherwiseDenied complaints  Cardiovascular:  Denied complaints  Respiratory: dyspnea, cough, wheeze, otherwise Denied complaints  Gastrointestinal: Denied complaints        Objective   Objective     Vitals:   Temp:  [97.2 °F (36.2 °C)-98.1 °F (36.7 °C)] 97.9 °F (36.6 °C)  Heart Rate:  [74-98] 74  Resp:  [16-29] 18  BP: (125-148)/() 148/96  Flow (L/min):  [2-3] 3    Physical Exam   Vital Signs Reviewed   General: Frail elderly female, Alert, NAD.    HEENT:  PERRL, EOMI.  OP, nares clear  Chest:  good aeration, diminished with rhonchi at left base with bilateral wheezing, tympanic to percussion bilaterally, no work of breathing noted  CV: RRR, no MGR, pulses 2+, equal.  Abd:  Soft, NT, ND, + BS, no HSM  EXT:  no clubbing, no cyanosis, no edema  Neuro:  A&Ox3, CN grossly intact, no focal deficits.  Skin: No rashes or lesions noted       Result Review    Result Review:  I have personally reviewed the results from the time of this admission to 2023 11:33 EDT and agree with these findings:  [x]  Laboratory  [x]  Microbiology  [x]  Radiology  [x]  EKG/Telemetry   []  Cardiology/Vascular   []  Pathology  [x]  Old records  []  Other:  Most notable findings include:   Cultures so far negative        Lab 23  0140 23  1255 23  0200 23  1338   WBC 8.35  --  6.09 12.24*   HEMOGLOBIN 13.3 13.2 14.2 17.1*   HEMATOCRIT 39.0 38.1 40.9 50.0*   PLATELETS 231  --  247 292   SODIUM 140 135* 135* 136   POTASSIUM 3.9 3.7 3.7 3.5   CHLORIDE 105 103  101 97*   CO2 26.7 22.0 24.8 24.8   BUN 10 11 13 11   CREATININE 0.83 0.73 0.80 0.96   GLUCOSE 107* 93 181* 131*   CALCIUM 8.9 9.0 9.1 10.0   TOTAL PROTEIN  --  5.4*  --  7.2   ALBUMIN  --  3.4*  --  4.0   GLOBULIN  --  2.0  --  3.2       CT Chest With Contrast Diagnostic    Result Date: 4/16/2023  PROCEDURE: CT CHEST W CONTRAST DIAGNOSTIC  COMPARISON:  [facility], CR, XR CHEST 1 VW, 12/17/2021, 3:38.  [facility], CR, XR CHEST 1 VW, 5/01/2022, 13:07.  [facility], CR, XR CHEST 1 VW, 4/16/2023, 13:29.  [facility], CT, CHEST W/O CONTRAST, 11/09/2011, 14:11.  [facility], CT, CT ABDOMEN PELVIS W CONTRAST, 4/04/2023, 18:53. INDICATIONS: Shortness of breath and tachycardia  TECHNIQUE: After obtaining the patient's consent, CT images were obtained with non-ionic intravenous contrast material.   PROTOCOL:   Pulmonary embolism imaging protocol performed    RADIATION:   DLP: 244.4mGy*cm   Automated exposure control was utilized to minimize radiation dose. CONTRAST: 68cc Isovue 370 I.V. LABS:   eGFR: >60ml/min/1.73m2  FINDINGS:  No pulmonary embolism is identified.  A small left pleural effusion is noted.  A small pericardial effusion is evident.  No adenopathy is identified.  Consolidation is noted in the left lower lobe.  There is a prominent bulla noted in the medial aspect of the left lower lobe.  Marked emphysematous changes are noted.  There is an incompletely imaged fusiform abdominal aortic aneurysm measuring at least 5.1 cm AP.  A bifurcated stent graft has been placed.  A cholecystectomy has been performed.  In the right hepatic lobe on image 267 is a low-density mass consistent with a cyst.  In the left hepatic lobe at the same level is a 0.8 cm hypodense focus, suspected to represent a cyst but too small for definitive characterization.  There is moderate to severe atrophy of the left kidney.  There are suspected bilateral renal cysts.  No  focal osseous lesion is seen.      Impression:   1. No evidence of pulmonary embolism 2. Small left pleural and pericardial effusions 3. Consolidation in the left lower lobe may represent atelectasis or pneumonia. 4. Abdominal aortic aneurysm, incompletely imaged on this examination 5. Previous cholecystectomy 6. Moderate to severe atrophy of the left kidney 7. Marked emphysematous changes     Jermaine Biggs M.D.       Electronically Signed and Approved By: Jermaine Biggs M.D. on 4/16/2023 at 17:43                 Assessment & Plan   Assessment / Plan     Active Hospital Problems:  Active Hospital Problems    Diagnosis    • **Acute respiratory failure with hypoxia    • Severe Malnutrition (HCC)    • Pneumonia due to infectious organism    • Mucus plugging of bronchi    • COPD (chronic obstructive pulmonary disease)          Impression:  Acute hypoxic respiratory failure  Healthcare associat nosocomial pneumonia versus postobstructive pneumonia left lower lobe from unspecified organism  Mucous plugging versus endobronchial lesion left lower lobe  Question aspiration and airways  Lactic acidosis  Sepsis  Acute COPD exacerbation  Chronic dementia  Ongoing tobacco use of cigarettes     Plan:  Wean O2 to keep SPO2 greater than 90%  Continue triple nebulizer therapy, albuterol as needed and bronchopulmonary hygiene  CT concerning for endobronchial lesion versus mucous plug versus aspirated foreign body  Agreeable today for bronchoscopy with airway inspection, brushings, biopsies, bronchoalveolar lavage and bronchial washings  I have discussed the risks of the procedure with the patient including pneumothorax, hemothorax, bleeding, hypoxia, required mechanical ventilation and death. The patient recognizes these findings, acknowledges these findings and is agreeable to the procedure.  On day 2/7 of cefepime.  De-escalate based off cultures.  So far negative  Change steroids to prednisone 40 mg daily.  Day 2/5  Family  requesting neurology evaluation for work-up of dementia.  Can defer to this being performed as an outpatient  Sputum culture pending  Encourage activity and incentive spirometer use    DVT prophylaxis:  Medical DVT prophylaxis orders are present.    CODE STATUS:   Level Of Support Discussed With: Health Care Surrogate; Next of Kin (If No Surrogate)  Code Status (Patient has no pulse and is not breathing): CPR (Attempt to Resuscitate)  Medical Interventions (Patient has pulse or is breathing): Full Support      Labs, imaging, microbiology, notes and medications personally reviewed  Discussed with primary    Electronically signed by Cabrera Obando MD, 04/18/23, 11:36 AM EDT.

## 2023-04-18 NOTE — TELEPHONE ENCOUNTER
Spoke to -consults need to be done via attending physician or requesting via nursing staff. Spoke to emergency contact -She states that she has done that and was told to call our office. Advised that she needs to speak with them again, since we have not seen pt we cannot give MRI results or advise. She asked about doing a telehealth visit-I advised that we do not do those on new pts or on memory patients. She states her mom is weak and she was advised by doctors at hospital that she probably does not need to be in/out of car so soon, pt does have an appt 4/25 for memory.

## 2023-04-18 NOTE — OP NOTE
Procedure:  Bronchoscopy with clearance of airways, bronchoalveolar lavage, bronchial washings     Pre-Operative Diagnosis: Mucous plugging versus endobronchial lesion, left lower lobe pneumonia     Post-Operative Diagnosis: Mucous plugging     Timeout performed     Anesthesia: MAC anesthesia     Procedure Details: The patient was consented for the procedure with all risk and benefit of the procedure explained in detail.  She was given the opportunity to ask questions and all concerns were answered. The bronchoscope was inserted into the main airway via the oral cavity. An anatomical survey was done of the main airways and the subsegmental bronchus.      Findings: The left vocal cord had as small flat polyp. They were otherwise normal in appearance and movement with abduction and adduction without difficulty. The trachea was normal in caliber and had no mucosal abnormalities. The left tracheobronchial tree appeared anatomically normal with no mucosal abnormalities.  No endobronchial lesions were noted in the left lower lobe bronchus.  There was mucus plugging with dense and copious amounts of thick viscous purulent secretions obstructing the left lower lobe bronchus.  These were removed en bloc after instillation of saline aliquots.  The right tracheobronchial tree appeared anatomically normal with no mucosal abnormalities. A bronchoalveolar lavage was performed using 2x60 mL aliquots of normal saline  instilled into the left lower lobe bronchus then aspirated back. There was 30 mL turbid purulent fluid with plugs in return.  Bronchial washings were collected.     Findings:  Small left vocal cord polyp  Dense mucous plugging left lower lobe bronchus with thick viscous copious purulent secretions.  No endobronchial lesions noted     Estimated Blood Loss: 0mL     Specimens:  Bronchoalveolar lavage left lower lobe bronchus  Bronchial washings     Complications: None; patient tolerated the procedure well.      Disposition: Stable to return to floor.  Follow-up test results. Recommend outpatient ENT evaluation for left vocal cord polyp.     Patient tolerated the procedure well.    Electronically signed by Cabrera Obando MD, 04/18/23, 11:37 AM EDT.

## 2023-04-18 NOTE — INTERVAL H&P NOTE
H&P reviewed. The patient was examined and there are no changes to the H&P.      Electronically signed by Cabrera Obando MD, 04/18/23, 9:59 AM EDT.

## 2023-04-18 NOTE — TELEPHONE ENCOUNTER
Caller: SKYLAR PHELPS    Relationship: Emergency Contact    Best call back number: [phone]    What is the best time to reach you: ANY    Who are you requesting to speak with (clinical staff, provider,  specific staff member): LIVE    What was the call regarding: PATIENTS DAUGHTER TELEPHONED TO REQUEST TO SEE IF ON CALL NEUROLOGIST CAN SEE THE PATIENT FOR CONSULT & EVAL OF MRI WHILE PATIENT IS ADMITTED TO [facility]    Do you require a callback: YES PLEASE CALL & ADVISE    THANK YOU

## 2023-04-18 NOTE — PROGRESS NOTES
Date of service: 4/18/2023    Subjective: More awake today.  Has cough and chest pain after taking a deep breath.  No SOB at rest or palpitations.    Review of systems: Limited due to dementia.  No headache, nausea, vomiting or abdominal pain.    Physical examination: She is seen by awake and arousable.  Vital signs: Reviewed  Neck: No JVD or carotid bruit  Chest: Poor air entry.  Clear to auscultation.    Less chest wall tenderness.    Cardiac: RRR.  No gallop or murmurs  Abdomen: Soft, tender all over.  No megalies or masses.  Extremities: No edema, cyanosis or clubbing.  Pulse intact.  Neuro: Alert, oriented to her name, no facial droop and speech was clear.     Records: Reviewed.     Assessment and plan:     1.  Pleuritic type chest pain and or musculoskeletal pain, improving.  Treat the underlying medical problems such as pneumonia and cough per the primary service  2.  Elevated HS troponin T, mostly due to demand ischemia. CK-MB  was normal.  Acute MI was ruled out.  3.  Acute hypoxemic respiratory failure, improving  4.  Sepsis and pneumonia  5.  Status post repair of AAA.  6.  COPD  7.  Cardiac: Stable.  We will follow-up with you periodically.

## 2023-04-18 NOTE — ANESTHESIA PREPROCEDURE EVALUATION
Anesthesia Evaluation     no history of anesthetic complications:  NPO Solid Status: > 6 hours  NPO Liquid Status: > 6 hours           Airway   Mallampati: II  TM distance: >3 FB  Neck ROM: full  Dental    (+) edentulous    Pulmonary - normal exam   (+) pneumonia stable , a smoker Current, COPD,   Cardiovascular - normal exam    (+) hypertension, hyperlipidemia,       Neuro/Psych  (+) psychiatric history Anxiety and Depression, dementia,    GI/Hepatic/Renal/Endo    (+)  GERD well controlled,  diabetes mellitus type 2,     Musculoskeletal     Abdominal  - normal exam   Substance History      OB/GYN          Other   blood dyscrasia (Polycythemia),                   Anesthesia Plan    ASA 4     general     intravenous induction     Anesthetic plan, risks, benefits, and alternatives have been provided, discussed and informed consent has been obtained with: patient.    Plan discussed with CRNA.        CODE STATUS:    Level Of Support Discussed With: Health Care Surrogate; Next of Kin (If No Surrogate)  Code Status (Patient has no pulse and is not breathing): CPR (Attempt to Resuscitate)  Medical Interventions (Patient has pulse or is breathing): Full Support

## 2023-04-19 PROCEDURE — 25010000002 ENOXAPARIN PER 10 MG: Performed by: INTERNAL MEDICINE

## 2023-04-19 PROCEDURE — 63710000001 PREDNISONE PER 1 MG: Performed by: INTERNAL MEDICINE

## 2023-04-19 PROCEDURE — 94761 N-INVAS EAR/PLS OXIMETRY MLT: CPT

## 2023-04-19 PROCEDURE — 99232 SBSQ HOSP IP/OBS MODERATE 35: CPT | Performed by: FAMILY MEDICINE

## 2023-04-19 PROCEDURE — 94799 UNLISTED PULMONARY SVC/PX: CPT

## 2023-04-19 PROCEDURE — 25010000002 CEFEPIME PER 500 MG: Performed by: INTERNAL MEDICINE

## 2023-04-19 PROCEDURE — 63710000001 REVEFENACIN 175 MCG/3ML SOLUTION: Performed by: INTERNAL MEDICINE

## 2023-04-19 PROCEDURE — 94664 DEMO&/EVAL PT USE INHALER: CPT

## 2023-04-19 PROCEDURE — 99233 SBSQ HOSP IP/OBS HIGH 50: CPT | Performed by: INTERNAL MEDICINE

## 2023-04-19 RX ORDER — HYDROXYZINE HYDROCHLORIDE 25 MG/1
25 TABLET, FILM COATED ORAL ONCE AS NEEDED
Status: COMPLETED | OUTPATIENT
Start: 2023-04-19 | End: 2023-04-19

## 2023-04-19 RX ORDER — CEFDINIR 300 MG/1
300 CAPSULE ORAL EVERY 12 HOURS SCHEDULED
Status: DISCONTINUED | OUTPATIENT
Start: 2023-04-19 | End: 2023-04-22

## 2023-04-19 RX ADMIN — PANTOPRAZOLE SODIUM 40 MG: 40 TABLET, DELAYED RELEASE ORAL at 06:06

## 2023-04-19 RX ADMIN — BUDESONIDE 0.5 MG: 0.5 SUSPENSION RESPIRATORY (INHALATION) at 06:44

## 2023-04-19 RX ADMIN — ENOXAPARIN SODIUM 30 MG: 100 INJECTION SUBCUTANEOUS at 09:19

## 2023-04-19 RX ADMIN — PREDNISONE 40 MG: 20 TABLET ORAL at 07:46

## 2023-04-19 RX ADMIN — HYDROXYZINE HYDROCHLORIDE 25 MG: 25 TABLET, FILM COATED ORAL at 23:56

## 2023-04-19 RX ADMIN — ARFORMOTEROL TARTRATE 15 MCG: 15 SOLUTION RESPIRATORY (INHALATION) at 06:44

## 2023-04-19 RX ADMIN — CEFDINIR 300 MG: 300 CAPSULE ORAL at 22:00

## 2023-04-19 RX ADMIN — Medication 10 ML: at 09:20

## 2023-04-19 RX ADMIN — ARFORMOTEROL TARTRATE 15 MCG: 15 SOLUTION RESPIRATORY (INHALATION) at 18:50

## 2023-04-19 RX ADMIN — Medication 5 MG: at 22:00

## 2023-04-19 RX ADMIN — PRAVASTATIN SODIUM 40 MG: 40 TABLET ORAL at 22:00

## 2023-04-19 RX ADMIN — REVEFENACIN 175 MCG: 175 SOLUTION RESPIRATORY (INHALATION) at 06:44

## 2023-04-19 RX ADMIN — LISINOPRIL 20 MG: 20 TABLET ORAL at 09:19

## 2023-04-19 RX ADMIN — BUDESONIDE 0.5 MG: 0.5 SUSPENSION RESPIRATORY (INHALATION) at 18:50

## 2023-04-19 RX ADMIN — Medication 10 ML: at 22:01

## 2023-04-19 RX ADMIN — CEFEPIME HYDROCHLORIDE 2 G: 2 INJECTION, POWDER, FOR SOLUTION INTRAMUSCULAR; INTRAVENOUS at 09:20

## 2023-04-19 RX ADMIN — ALPRAZOLAM 0.25 MG: 0.25 TABLET ORAL at 16:51

## 2023-04-19 RX ADMIN — ALPRAZOLAM 0.25 MG: 0.25 TABLET ORAL at 07:46

## 2023-04-19 RX ADMIN — ACETAMINOPHEN 650 MG: 325 TABLET ORAL at 07:45

## 2023-04-19 RX ADMIN — CITALOPRAM HYDROBROMIDE 20 MG: 20 TABLET ORAL at 06:07

## 2023-04-19 NOTE — PROGRESS NOTES
" [facility]   Hospitalist Progress Note  Date: 2023  Patient Name: Bessy Leggett  : 1950  MRN: [id number]  Date of admission: 2023      Subjective   Subjective     Summary: 73 y.o. female past medical history of depression/anxiety, COPD not on home oxygen, GERD, hypertension, IBS, chronic low back pain, dementia who presents to the ER due to 4-day history of worsening mental status, cough and shortness of breath.  Patient is a poor historian and unable to provide clear details about her history.  History supplemented by discussion with daughter at the bedside as well as discussion with ER staff.  Chart review shows patient was recently hospitalized here 2 weeks ago for GI bleed and COVID. EGD at that time showed normal esophagus with a small hiatal hernia and diffuse inflammation characterized by erythema and granularity in the antrum where biopsies were taken.  Patient at that time also had a UTI that was treated with antibiotics.  She was discharged home in stable condition however since being home over the last 4 days her daughter noticed her to be more lethargic with minimal activity.  In addition she has had intermittent spells of \"shaking\" that do not seem to be seizure type activity according to the daughter.  Patient has also been complaining of a pleuritic chest pain in the left chest around her breast as well as some dyspnea.  This has been associated with a productive cough over the last 2 days.  Her daughter has also noted oxygen saturations in the 80s at home for which she has been using another family member's oxygen to treat her.  Ultimately her dyspnea progressed significantly today and the daughter decided to bring her into the ER for evaluation     Upon arrival here patient was tachycardic to 114 and tachypneic to 35.  She was hypoxic on room air.  Lab work revealed a mildly elevated proBNP of 1199, leukocytosis of 12,000, elevated hemoglobin of 17.  CTA was personally " reviewed and did not reveal evidence of a pulmonary embolism but did reveal left pleural effusion as well as consolidation of the left lower lobe as well as marked emphysematous changes.EKG personally reviewed shows sinus tachycardia, borderline prolonged MI interval and prolonged QTc with poor progression anteriorly, PACs and biatrial enlargement.  Blood cultures were drawn and patient started on Rocephin and doxycycline for suspected pneumonia.  Hospitalist service was then contacted for admission.    She had an episode of chest pain on 4/17/23 and had an RRT called.  Repeat lab and EKG was obtained and not significantly changed.  Chest pain improved with a dose of nitroglycerin.  Cardiology was consulted.   I reviewed her CT of the chest which was concerning for possible need for bronchoscopy.  Chest pain was felt to be pulmonary in origin.  Pulmonology was consulted. Underwent bronchoscopy on 4/17/2023.  She had significant mucus plugging with thick secretions which were suctioned out and cultures were sent.     Interval Followup: No acute events overnight.  Patient much improved today.   Patient currently resting comfortably on room air.      Objective   Objective     Vitals:   Temp:  [97.1 °F (36.2 °C)-98 °F (36.7 °C)] 97.9 °F (36.6 °C)  Heart Rate:  [] 87  Resp:  [20-26] 20  BP: (122-162)/(73-88) 134/84  Flow (L/min):  [2-3] 2  Physical Exam    Constitutional: Awake, somnolent, no acute distress   Respiratory: Diminished breath sounds to auscultation bilaterally, nonlabored respirations    Cardiovascular: RRR   Gastrointestinal: Positive bowel sounds, soft, nontender, nondistended   Neurologic: Oriented x 3, strength symmetric in all extremities, Cranial Nerves grossly intact to confrontation, speech clear   Skin: No rashes       Assessment & Plan   Assessment / Plan     Assessment/Plan:  • Acute hypoxic respiratory failure secondary to HCAP and COPD exacerbation  • Sepsis 2/2 Healthcare associated  pneumonia  • Dense mucous plugging left lower lobe bronchus with thick viscous copious purulent secretions.   • Acute metabolic encephalopathy secondary to above on chronic dementia with amyloid angiopathy  • Lactic acidosis likely secondary to sepsis above  • Prolonged QTc.  Resolved  • Elevated troponin likely type II MI secondary to demand ischemia from above.  Stable  • Polycythemia, likely secondary to chronic hypoxia  • Suspected dementia amyloid angiopathy  • Infrarenal abdominal aortic aneurysm, 5.6 cm status post prior graft repair  • COPD     Plan  · Remain admitted to telemetry on the hospitalist service  · Consult neurology to sort out the plan for the amyloid angiopathy  · We will continue broad-spectrum antibiotics with vancomycin and cefepime  · Sputum cultures and bronchopulmonary hygiene ordered  · Consult pulmonary to consider bronchoscopy due to apparent mucous plugging of the left lower lobe  · Follow-up blood cultures  · Continue scheduled nebulizers  · Continue systemic corticosteroids  · Continuous pulse ox and telemetry ordered for closer monitoring of patient.  · Procalcitonin low  · Follow-up blood cultures.  Continue to review labs daily  · Cardiology following.  Appreciate input  · Consult PT/OT  · Avoid QTc prolonging medications, repeat high-sensitivity troponin on schedule  · Patient has outpatient follow-up with neurology later this month for suspected dementia, advised to keep appointment  · Advised vascular surgery follow-up for known infrarenal AAA  · All labs, imaging and EKG personally reviewed, findings as described above      Discussed plan with RN.    DVT prophylaxis:  Medical DVT prophylaxis orders are present.    CODE STATUS:   Level Of Support Discussed With: Health Care Surrogate; Next of Kin (If No Surrogate)  Code Status (Patient has no pulse and is not breathing): CPR (Attempt to Resuscitate)  Medical Interventions (Patient has pulse or is breathing): Full  Support

## 2023-04-19 NOTE — NURSING NOTE
Pt's family requests to minimize awakenings and group care. This RN spoke with pt and family regarding turns and family requests that pt be turned when requested or with vitals.  This RN told PCA to group vitals and daily cares with RN work when possible.

## 2023-04-19 NOTE — PLAN OF CARE
Goal Outcome Evaluation:           Progress: no change  Outcome Evaluation: Pt very anxious and paranoid overnight, xanax and atarax given. Vitals WNL. Remains on 3L

## 2023-04-19 NOTE — PROGRESS NOTES
Pulmonary / Critical Care Progress Note      Patient Name: Bessy Leggett  : 1950  MRN: [id number]  Attending:  Dick Mitchell DO  Date of admission: 2023    Subjective   Subjective   Follow-up for pneumonia     Over past 24 hours, underwent bronchoscopy for extensive mucous plugging, BAL cultures and cytology pending    Overnight, patient was anxious and paranoid requiring Xanax and Atarax.    This morning,  Lying awake in bed on 2 L NC with SPO2 99%  O2 turned off  Family at bedside  Breathing improved  Continues with congested cough  Remains weak and fatigued  Intermittent confusion  No fever/chills  No chest pain    Review of Systems  General: fatigue and weakness, otherwise denied complaints  Cardiovascular:  Denied complaints  Respiratory: dyspnea, cough, otherwise denied complaints  Gastrointestinal: Denied complaints    Objective   Objective     Vitals:   Temp:  [97.1 °F (36.2 °C)-98 °F (36.7 °C)] 97.9 °F (36.6 °C)  Heart Rate:  [] 74  Resp:  [20-26] 22  BP: (122-162)/(73-88) 162/76  Flow (L/min):  [2-3] 2    Physical Exam   Vital Signs Reviewed   General: Frail elderly female, Alert, NAD.    HEENT:  PERRL, EOMI.  OP, nares clear  Chest:  good aeration, diminished with rhonchi at left base with bilateral wheezing, tympanic to percussion bilaterally, no work of breathing noted  CV: RRR, no MGR, pulses 2+, equal.  Abd:  Soft, NT, ND, + BS, no HSM  EXT:  no clubbing, no cyanosis, no edema  Neuro:  A&Ox3, CN grossly intact, no focal deficits.  Skin: No rashes or lesions noted       Result Review    Result Review:  I have personally reviewed the results from the time of this admission to 2023 12:38 EDT and agree with these findings:  [x]  Laboratory  [x]  Microbiology  [x]  Radiology  [x]  EKG/Telemetry   []  Cardiology/Vascular   []  Pathology  [x]  Old records  []  Other:  Most notable findings include:     Cultures NGTD        Lab 23  0140 23  1255 23  0200  04/16/23  1338   WBC 8.35  --  6.09 12.24*   HEMOGLOBIN 13.3 13.2 14.2 17.1*   HEMATOCRIT 39.0 38.1 40.9 50.0*   PLATELETS 231  --  247 292   SODIUM 140 135* 135* 136   POTASSIUM 3.9 3.7 3.7 3.5   CHLORIDE 105 103 101 97*   CO2 26.7 22.0 24.8 24.8   BUN 10 11 13 11   CREATININE 0.83 0.73 0.80 0.96   GLUCOSE 107* 93 181* 131*   CALCIUM 8.9 9.0 9.1 10.0   TOTAL PROTEIN  --  5.4*  --  7.2   ALBUMIN  --  3.4*  --  4.0   GLOBULIN  --  2.0  --  3.2       CT Chest With Contrast Diagnostic    Result Date: 4/16/2023  PROCEDURE: CT CHEST W CONTRAST DIAGNOSTIC  COMPARISON:  [facility], CR, XR CHEST 1 VW, 12/17/2021, 3:38.  [facility], CR, XR CHEST 1 VW, 5/01/2022, 13:07.  [facility], CR, XR CHEST 1 VW, 4/16/2023, 13:29.  [facility], CT, CHEST W/O CONTRAST, 11/09/2011, 14:11.  [facility], CT, CT ABDOMEN PELVIS W CONTRAST, 4/04/2023, 18:53. INDICATIONS: Shortness of breath and tachycardia  TECHNIQUE: After obtaining the patient's consent, CT images were obtained with non-ionic intravenous contrast material.   PROTOCOL:   Pulmonary embolism imaging protocol performed    RADIATION:   DLP: 244.4mGy*cm   Automated exposure control was utilized to minimize radiation dose. CONTRAST: 68cc Isovue 370 I.V. LABS:   eGFR: >60ml/min/1.73m2  FINDINGS:  No pulmonary embolism is identified.  A small left pleural effusion is noted.  A small pericardial effusion is evident.  No adenopathy is identified.  Consolidation is noted in the left lower lobe.  There is a prominent bulla noted in the medial aspect of the left lower lobe.  Marked emphysematous changes are noted.  There is an incompletely imaged fusiform abdominal aortic aneurysm measuring at least 5.1 cm AP.  A bifurcated stent graft has been placed.  A cholecystectomy has been performed.  In the right hepatic lobe on image 267 is a low-density mass consistent with a cyst.  In the left hepatic lobe at the same level  is a 0.8 cm hypodense focus, suspected to represent a cyst but too small for definitive characterization.  There is moderate to severe atrophy of the left kidney.  There are suspected bilateral renal cysts.  No focal osseous lesion is seen.      Impression:   1. No evidence of pulmonary embolism 2. Small left pleural and pericardial effusions 3. Consolidation in the left lower lobe may represent atelectasis or pneumonia. 4. Abdominal aortic aneurysm, incompletely imaged on this examination 5. Previous cholecystectomy 6. Moderate to severe atrophy of the left kidney 7. Marked emphysematous changes     Jermaine Biggs M.D.       Electronically Signed and Approved By: Jermaine Biggs M.D. on 4/16/2023 at 17:43                 Assessment & Plan   Assessment / Plan     Active Hospital Problems:  Active Hospital Problems    Diagnosis    • **Acute respiratory failure with hypoxia    • Severe Malnutrition (HCC)    • Pneumonia due to infectious organism    • Mucus plugging of bronchi    • COPD (chronic obstructive pulmonary disease)          Impression:  Acute hypoxic respiratory failure  Healthcare associat nosocomial pneumonia versus postobstructive pneumonia left lower lobe from unspecified organism  Mucous plugging versus endobronchial lesion left lower lobe  Question aspiration and airways  Lactic acidosis  Sepsis  Acute COPD exacerbation  Chronic dementia  Ongoing tobacco use of cigarettes  Left vocal cord polyp     Plan:  -O2 weaned off, may utilize to maintain O2 greater than 90%  -Cefepime changed to cefdinir 300 mg twice daily for 7-day total treatment  -BAL cultures/cytology pending,   -Continue prednisone 40 mg daily  -Continue Brovana, Pulmicort, Yupelri and albuterol nebulizers  -Continue bronchopulmonary hygiene  -Encourage I-S/flutter valve  -PT/OT on board  -Encourage mobilization, out of bed to chair  -Small left vocal cord polyp incidentally noted on bronchoscopy yesterday, recommend outpatient ENT follow-up  upon discharge       DVT prophylaxis:  Medical DVT prophylaxis orders are present.    CODE STATUS:   Level Of Support Discussed With: Health Care Surrogate; Next of Kin (If No Surrogate)  Code Status (Patient has no pulse and is not breathing): CPR (Attempt to Resuscitate)  Medical Interventions (Patient has pulse or is breathing): Full Support      Labs, imaging, microbiology, notes and medications personally reviewed  Discussed with primary    Electronically signed by ROSANGELA Camarillo, 04/19/23, 12:38 PM EDT.    I, Dr. Cabrera Obando, have spent more than 50% of the total time managing the patient in this encounter today.  This included personally reviewing all pertinent labs, imaging, microbiology and documentation. Also discussing the case with the patient and any available family, the admitting physician and any available ancillary staff.    Electronically signed by Cabrera Obando MD, 04/19/23, 2:22 PM EDT.

## 2023-04-19 NOTE — PLAN OF CARE
Goal Outcome Evaluation:                   Patient is alert and oriented x3; disoriented to situation.  Vital signs stable.  Patient complained of pain x1 this shift and was medicated per MAR.  Patient complained of anxiety x2 and was medicated per MAR.  IV antibiotics per MAR.  Patient up in chair for dinner.  Patient taken of oxygen this morning by pulmonology.  Patient remains on continuous pulse oximeter.  No needs voiced at this time.  Continuing plan of care.

## 2023-04-20 PROBLEM — F01.50 VASCULAR DEMENTIA WITHOUT BEHAVIORAL DISTURBANCE: Status: ACTIVE | Noted: 2023-04-20

## 2023-04-20 PROBLEM — F01.518 VASCULAR DEMENTIA WITH BEHAVIOR DISTURBANCE: Status: ACTIVE | Noted: 2023-04-20

## 2023-04-20 LAB
ANION GAP SERPL CALCULATED.3IONS-SCNC: 6.9 MMOL/L (ref 5–15)
BACTERIA SPEC AEROBE CULT: NORMAL
BACTERIA SPEC RESP CULT: NORMAL
BASOPHILS # BLD AUTO: 0.01 10*3/MM3 (ref 0–0.2)
BASOPHILS NFR BLD AUTO: 0.1 % (ref 0–1.5)
BUN SERPL-MCNC: 17 MG/DL (ref 8–23)
BUN/CREAT SERPL: 23 (ref 7–25)
CALCIUM SPEC-SCNC: 8.9 MG/DL (ref 8.6–10.5)
CHLORIDE SERPL-SCNC: 106 MMOL/L (ref 98–107)
CO2 SERPL-SCNC: 27.1 MMOL/L (ref 22–29)
CREAT SERPL-MCNC: 0.74 MG/DL (ref 0.57–1)
CYTO UR: NORMAL
DEPRECATED RDW RBC AUTO: 44.3 FL (ref 37–54)
EGFRCR SERPLBLD CKD-EPI 2021: 86.1 ML/MIN/1.73
EOSINOPHIL # BLD AUTO: 0.03 10*3/MM3 (ref 0–0.4)
EOSINOPHIL NFR BLD AUTO: 0.4 % (ref 0.3–6.2)
ERYTHROCYTE [DISTWIDTH] IN BLOOD BY AUTOMATED COUNT: 12.8 % (ref 12.3–15.4)
GLUCOSE SERPL-MCNC: 113 MG/DL (ref 65–99)
GRAM STN SPEC: NORMAL
GRAM STN SPEC: NORMAL
HCT VFR BLD AUTO: 37 % (ref 34–46.6)
HGB BLD-MCNC: 12.5 G/DL (ref 12–15.9)
IMM GRANULOCYTES # BLD AUTO: 0.04 10*3/MM3 (ref 0–0.05)
IMM GRANULOCYTES NFR BLD AUTO: 0.6 % (ref 0–0.5)
LAB AP CASE REPORT: NORMAL
LAB AP CLINICAL INFORMATION: NORMAL
LYMPHOCYTES # BLD AUTO: 1.37 10*3/MM3 (ref 0.7–3.1)
LYMPHOCYTES NFR BLD AUTO: 19.9 % (ref 19.6–45.3)
MCH RBC QN AUTO: 31.6 PG (ref 26.6–33)
MCHC RBC AUTO-ENTMCNC: 33.8 G/DL (ref 31.5–35.7)
MCV RBC AUTO: 93.7 FL (ref 79–97)
MONOCYTES # BLD AUTO: 0.62 10*3/MM3 (ref 0.1–0.9)
MONOCYTES NFR BLD AUTO: 9 % (ref 5–12)
NEUTROPHILS NFR BLD AUTO: 4.82 10*3/MM3 (ref 1.7–7)
NEUTROPHILS NFR BLD AUTO: 70 % (ref 42.7–76)
NRBC BLD AUTO-RTO: 0 /100 WBC (ref 0–0.2)
PATH REPORT.FINAL DX SPEC: NORMAL
PATH REPORT.GROSS SPEC: NORMAL
PLATELET # BLD AUTO: 177 10*3/MM3 (ref 140–450)
PMV BLD AUTO: 9.1 FL (ref 6–12)
POTASSIUM SERPL-SCNC: 3.5 MMOL/L (ref 3.5–5.2)
RBC # BLD AUTO: 3.95 10*6/MM3 (ref 3.77–5.28)
SODIUM SERPL-SCNC: 140 MMOL/L (ref 136–145)
WBC NRBC COR # BLD: 6.89 10*3/MM3 (ref 3.4–10.8)

## 2023-04-20 PROCEDURE — 94664 DEMO&/EVAL PT USE INHALER: CPT

## 2023-04-20 PROCEDURE — 63710000001 REVEFENACIN 175 MCG/3ML SOLUTION: Performed by: INTERNAL MEDICINE

## 2023-04-20 PROCEDURE — 99233 SBSQ HOSP IP/OBS HIGH 50: CPT | Performed by: INTERNAL MEDICINE

## 2023-04-20 PROCEDURE — 63710000001 PREDNISONE PER 1 MG: Performed by: INTERNAL MEDICINE

## 2023-04-20 PROCEDURE — 94668 MNPJ CHEST WALL SBSQ: CPT

## 2023-04-20 PROCEDURE — 94799 UNLISTED PULMONARY SVC/PX: CPT

## 2023-04-20 PROCEDURE — 80048 BASIC METABOLIC PNL TOTAL CA: CPT | Performed by: FAMILY MEDICINE

## 2023-04-20 PROCEDURE — 97165 OT EVAL LOW COMPLEX 30 MIN: CPT

## 2023-04-20 PROCEDURE — 25010000002 ENOXAPARIN PER 10 MG: Performed by: INTERNAL MEDICINE

## 2023-04-20 PROCEDURE — 25010000002 CEFTRIAXONE PER 250 MG: Performed by: PHYSICIAN ASSISTANT

## 2023-04-20 PROCEDURE — 94761 N-INVAS EAR/PLS OXIMETRY MLT: CPT

## 2023-04-20 PROCEDURE — 25010000002 HALOPERIDOL LACTATE PER 5 MG: Performed by: INTERNAL MEDICINE

## 2023-04-20 PROCEDURE — 85025 COMPLETE CBC W/AUTO DIFF WBC: CPT | Performed by: FAMILY MEDICINE

## 2023-04-20 PROCEDURE — 99221 1ST HOSP IP/OBS SF/LOW 40: CPT | Performed by: PSYCHIATRY & NEUROLOGY

## 2023-04-20 PROCEDURE — 99232 SBSQ HOSP IP/OBS MODERATE 35: CPT | Performed by: FAMILY MEDICINE

## 2023-04-20 PROCEDURE — 97161 PT EVAL LOW COMPLEX 20 MIN: CPT

## 2023-04-20 RX ORDER — POLYETHYLENE GLYCOL 3350 17 G/17G
17 POWDER, FOR SOLUTION ORAL DAILY PRN
Status: DISCONTINUED | OUTPATIENT
Start: 2023-04-20 | End: 2023-04-22

## 2023-04-20 RX ORDER — AMOXICILLIN 250 MG
2 CAPSULE ORAL 2 TIMES DAILY
Status: DISCONTINUED | OUTPATIENT
Start: 2023-04-20 | End: 2023-04-22

## 2023-04-20 RX ORDER — BISACODYL 5 MG/1
5 TABLET, DELAYED RELEASE ORAL DAILY PRN
Status: DISCONTINUED | OUTPATIENT
Start: 2023-04-20 | End: 2023-04-22

## 2023-04-20 RX ORDER — HALOPERIDOL 5 MG/ML
4 INJECTION INTRAMUSCULAR EVERY 4 HOURS PRN
Status: DISCONTINUED | OUTPATIENT
Start: 2023-04-20 | End: 2023-04-26 | Stop reason: HOSPADM

## 2023-04-20 RX ORDER — CEFTRIAXONE SODIUM 1 G/50ML
1 INJECTION, SOLUTION INTRAVENOUS ONCE
Status: COMPLETED | OUTPATIENT
Start: 2023-04-20 | End: 2023-04-20

## 2023-04-20 RX ORDER — BISACODYL 10 MG
10 SUPPOSITORY, RECTAL RECTAL DAILY PRN
Status: DISCONTINUED | OUTPATIENT
Start: 2023-04-20 | End: 2023-04-22

## 2023-04-20 RX ORDER — POTASSIUM CHLORIDE 750 MG/1
40 CAPSULE, EXTENDED RELEASE ORAL ONCE
Status: COMPLETED | OUTPATIENT
Start: 2023-04-20 | End: 2023-04-20

## 2023-04-20 RX ADMIN — Medication 10 ML: at 20:07

## 2023-04-20 RX ADMIN — ARFORMOTEROL TARTRATE 15 MCG: 15 SOLUTION RESPIRATORY (INHALATION) at 19:18

## 2023-04-20 RX ADMIN — CEFTRIAXONE SODIUM 1 G: 1 INJECTION, SOLUTION INTRAVENOUS at 20:41

## 2023-04-20 RX ADMIN — CITALOPRAM HYDROBROMIDE 20 MG: 20 TABLET ORAL at 08:03

## 2023-04-20 RX ADMIN — LISINOPRIL 20 MG: 20 TABLET ORAL at 08:02

## 2023-04-20 RX ADMIN — BUDESONIDE 0.5 MG: 0.5 SUSPENSION RESPIRATORY (INHALATION) at 07:32

## 2023-04-20 RX ADMIN — ARFORMOTEROL TARTRATE 15 MCG: 15 SOLUTION RESPIRATORY (INHALATION) at 07:32

## 2023-04-20 RX ADMIN — PANTOPRAZOLE SODIUM 40 MG: 40 TABLET, DELAYED RELEASE ORAL at 08:02

## 2023-04-20 RX ADMIN — Medication 10 ML: at 10:05

## 2023-04-20 RX ADMIN — ALPRAZOLAM 0.25 MG: 0.25 TABLET ORAL at 08:45

## 2023-04-20 RX ADMIN — ALPRAZOLAM 0.25 MG: 0.25 TABLET ORAL at 01:40

## 2023-04-20 RX ADMIN — ENOXAPARIN SODIUM 30 MG: 100 INJECTION SUBCUTANEOUS at 08:03

## 2023-04-20 RX ADMIN — HALOPERIDOL LACTATE 4 MG: 5 INJECTION, SOLUTION INTRAMUSCULAR at 13:41

## 2023-04-20 RX ADMIN — POTASSIUM CHLORIDE 40 MEQ: 10 CAPSULE, COATED, EXTENDED RELEASE ORAL at 10:05

## 2023-04-20 RX ADMIN — CEFDINIR 300 MG: 300 CAPSULE ORAL at 08:02

## 2023-04-20 RX ADMIN — BUDESONIDE 0.5 MG: 0.5 SUSPENSION RESPIRATORY (INHALATION) at 19:18

## 2023-04-20 RX ADMIN — PREDNISONE 40 MG: 20 TABLET ORAL at 08:02

## 2023-04-20 RX ADMIN — HALOPERIDOL LACTATE 4 MG: 5 INJECTION, SOLUTION INTRAMUSCULAR at 22:19

## 2023-04-20 RX ADMIN — REVEFENACIN 175 MCG: 175 SOLUTION RESPIRATORY (INHALATION) at 07:32

## 2023-04-20 RX ADMIN — BISACODYL 5 MG: 5 TABLET, COATED ORAL at 17:07

## 2023-04-20 NOTE — TELEPHONE ENCOUNTER
appt was canceled via interface. Dr Moreira was consulted today and his notes states to follow up in 4mths. Please advise

## 2023-04-20 NOTE — PLAN OF CARE
Goal Outcome Evaluation:  Plan of Care Reviewed With: patient           Outcome Evaluation: Pt is very anxious, paranoid over night with some delussions, Xanax Atarax given. Pt is resting at this time.

## 2023-04-20 NOTE — PLAN OF CARE
Goal Outcome Evaluation:  Plan of Care Reviewed With: patient, daughter        Progress: no change  Outcome Evaluation: Patient presents with limitations in self-care, functional transfers, balance, endurance, and BUE strength. She would benefit from continued skilled occupational therapy services to maximize independence with ADLs/functional transfers. Subacute rehab recommended upon discharge from hospital.

## 2023-04-20 NOTE — THERAPY EVALUATION
Patient Name: Bessy Leggett  : 1950    MRN: [id number]                              Today's Date: 2023       Admit Date: 2023    Visit Dx:     ICD-10-CM ICD-9-CM   1. COPD exacerbation  J44.1 491.21   2. Acute respiratory failure with hypoxia  J96.01 518.81   3. Pneumonia due to infectious organism, unspecified laterality, unspecified part of lung  J18.9 486   4. Mucus plugging of bronchi  T17.500A 519.19   5. Difficulty walking  R26.2 719.7   6. Decreased activities of daily living (ADL)  Z78.9 V49.89     Patient Active Problem List   Diagnosis   • AAA (abdominal aortic aneurysm) without rupture   • Hypertension   • Anxiety   • Asthma   • Cataract   • COPD (chronic obstructive pulmonary disease)   • Depression   • Non-insulin dependent type 2 diabetes mellitus   • Family history of blood clots   • GERD (gastroesophageal reflux disease)   • History of AAA (abdominal aortic aneurysm) repair   • History of seizure   • Hypercholesterolemia   • IBS (irritable bowel syndrome)   • Low back pain   • Melena   • Epigastric pain   • Intestinal metaplasia of stomach   • Acute respiratory failure with hypoxia   • Severe Malnutrition (HCC)   • Pneumonia due to infectious organism   • Mucus plugging of bronchi   • Vascular dementia with behavior disturbance     Past Medical History:   Diagnosis Date   • Anxiety 2018    PATIENT REPORTS A LONG HISTORY OF ANXIETY ALONG IWTH AGOURA PHOBIA. SHE HAS BEEN PREVIOUSLY BEEN SEEN BY PSYCH RECENTLY., HER XANAX WAS STOPPED. I WILL REFER THE PATIENT FOR EVALUATION BY MENTAL HEALTH SPECIALIST   • Asthma    • Cataract    • COPD (chronic obstructive pulmonary disease)    • Depression    • GERD (gastroesophageal reflux disease)    • Hypertension    • IBS (irritable bowel syndrome)    • Low back pain      Past Surgical History:   Procedure Laterality Date   • ABDOMINAL SURGERY     • BRONCHOSCOPY N/A 2023    Procedure: BRONCHOSCOPY WITH BRONCHIOALVEOLAR LAVAGE/WASHINGS;   Surgeon: Cabrera Obando MD;  Location: [facility] ENDOSCOPY;  Service: Pulmonary;  Laterality: N/A;  MUCOUS PLUGGING   • COLONOSCOPY     • ENDOSCOPY N/A 4/5/2023    Procedure: ESOPHAGOGASTRODUODENOSCOPY WITH BIOPSIES;  Surgeon: Shaina Pastrana MD;  Location: [facility] ENDOSCOPY;  Service: Gastroenterology;  Laterality: N/A;  GASTRITIS  HIATAL HERNIA   • GALLBLADDER SURGERY     • HYSTERECTOMY     • VASCULAR SURGERY        General Information     Row Name 04/20/23 1314          OT Time and Intention    Document Type evaluation  -     Mode of Treatment individual therapy;occupational therapy  -[facility] Name 04/20/23 1314          General Information    Patient Profile Reviewed yes  -     Prior Level of Function --  Per pt's daughter she required assist with ADLs, ambulated with a RW, has a step over tub with shower chair/grab bars/handheld shower head, elevated commode, sits to groom, and no  home O2.  -     Existing Precautions/Restrictions fall  -     Barriers to Rehab none identified  -[facility] Name 04/20/23 1314          Occupational Profile    Reason for Services/Referral (Occupational Profile) Patient is a 73 year old female admitted to [facility] for shortness of breath and chest pain on April 16th, 2023. Occupational therapy consulted due to recent decline in ADLs/functional transfers. No previous occupational therapy services for current condition.  -     Row Name 04/20/23 1314          Living Environment    People in Home child(anthony), adult  -[facility] Name 04/20/23 1314          Home Main Entrance    Number of Stairs, Main Entrance none  Ramp  -     Row Name 04/20/23 1314          Cognition    Orientation Status (Cognition) oriented to;person  very anxious, contributing to/exacerbating her SOB at times  -     Row Name 04/20/23 1314          Safety Issues, Functional Mobility    Impairments Affecting Function (Mobility) balance;cognition;endurance/activity  tolerance;strength;shortness of breath  -           User Key  (r) = Recorded By, (t) = Taken By, (c) = Cosigned By    Initials Name Provider Type    LF Maranda Bowden OT Occupational Therapist                 Mobility/ADL's     Row Name 04/20/23 1316          Bed Mobility    Comment, (Bed Mobility) Patient upright and seated in recliner upon OT's arrival. She grew anxious and declined performing sit to stand with OT, with her daughter reporting that she had just transferred to the recliner with PT prior to OT's arrival. Per PT's evaluation she required mod assist  for supine to sit and for sit to stand using RW.  -     Row Name 04/20/23 1316          Activities of Daily Living    BADL Assessment/Intervention bathing;upper body dressing;lower body dressing;grooming;feeding;toileting  -     Row Name 04/20/23 1316          Bathing Assessment/Intervention    [place] Level (Bathing) bathing skills;upper body;lower body;maximum assist (25% patient effort)  -     Row Name 04/20/23 1316          Upper Body Dressing Assessment/Training    [place] Level (Upper Body Dressing) upper body dressing skills;maximum assist (25% patient effort)  -     Row Name 04/20/23 1316          Lower Body Dressing Assessment/Training    [place] Level (Lower Body Dressing) lower body dressing skills;maximum assist (25% patient effort)  -     Row Name 04/20/23 1316          Grooming Assessment/Training    [place] Level (Grooming) grooming skills;maximum assist (25% patient effort)  -     Row Name 04/20/23 1316          Self-Feeding Assessment/Training    [place] Level (Feeding) feeding skills;minimum assist (75% patient effort)  -     Row Name 04/20/23 1316          Toileting Assessment/Training    [place] Level (Toileting) toileting skills;dependent (less than 25% patient effort)  -     Comment, (Toileting) Female purewick currently in place.  -           User Key  (r) = Recorded By, (t) =  Taken By, (c) = Cosigned By    Initials Name Provider Type     Maranda Bowden OT Occupational Therapist               Obj/Interventions     Row Name 04/20/23 1318          Sensory Assessment (Somatosensory)    Sensory Assessment (Somatosensory) UE sensation intact  -     Row Name 04/20/23 1318          Vision Assessment/Intervention    Visual Impairment/Limitations WFL  -     Row Name 04/20/23 1318          Range of Motion Comprehensive    Comment, General Range of Motion Full AAROM of BUEs  -     Row Name 04/20/23 1318          Strength Comprehensive (MMT)    Comment, General Manual Muscle Testing (MMT) Assessment 3+/5 BUEs  -     Row Name 04/20/23 1318          Motor Skills    Motor Skills coordination;functional endurance  -LF     Coordination bilateral;upper extremity;minimal impairment  Likely due to proximal weakness  -LF     Functional Endurance Poor+  -LF     Row Name 04/20/23 1318          Balance    Comment, Balance Not tested, likely impaired.  -LF           User Key  (r) = Recorded By, (t) = Taken By, (c) = Cosigned By    Initials Name Provider Type     Maranda Bowden OT Occupational Therapist               Goals/Plan     [facility] Name 04/20/23 1320          Bed Mobility Goal 1 (OT)    Activity/Assistive Device (Bed Mobility Goal 1, OT) bed mobility activities, all  -LF     [place] Level/Cues Needed (Bed Mobility Goal 1, OT) standby assist  -LF     Time Frame (Bed Mobility Goal 1, OT) long term goal (LTG);10 days  -[facility] Name 04/20/23 1320          Transfer Goal 1 (OT)    Activity/Assistive Device (Transfer Goal 1, OT) transfers, all;walker, rolling  -LF     [place] Level/Cues Needed (Transfer Goal 1, OT) standby assist  -LF     Time Frame (Transfer Goal 1, OT) long term goal (LTG);10 days  -[facility] Name 04/20/23 1320          Bathing Goal 1 (OT)    Activity/Device (Bathing Goal 1, OT) bathing skills, all  -LF     [place] Level/Cues Needed (Bathing Goal 1, OT) minimum  assist (75% or more patient effort)  -LF     Time Frame (Bathing Goal 1, OT) long term goal (LTG);10 days  -LF     Row Name 04/20/23 1320          Dressing Goal 1 (OT)    Activity/Device (Dressing Goal 1, OT) dressing skills, all  -LF     [place]/Cues Needed (Dressing Goal 1, OT) minimum assist (75% or more patient effort)  -LF     Time Frame (Dressing Goal 1, OT) long term goal (LTG);10 days  -LF     Row Name 04/20/23 1320          Toileting Goal 1 (OT)    Activity/Device (Toileting Goal 1, OT) toileting skills, all  -LF     [place] Level/Cues Needed (Toileting Goal 1, OT) minimum assist (75% or more patient effort)  -LF     Time Frame (Toileting Goal 1, OT) long term goal (LTG);10 days  -LF     Row Name 04/20/23 1320          Grooming Goal 1 (OT)    Activity/Device (Grooming Goal 1, OT) grooming skills, all  -LF     [place] (Grooming Goal 1, OT) set-up required  -LF     Time Frame (Grooming Goal 1, OT) long term goal (LTG);10 days  -     Row Name 04/20/23 1320          Strength Goal 1 (OT)    Strength Goal 1 (OT) Patient will demonstrate 4/5 BUE strength to support ADLs/functional transfers.  -LF     Time Frame (Strength Goal 1, OT) long term goal (LTG);10 days  -     Row Name 04/20/23 1320          Problem Specific Goal 1 (OT)    Problem Specific Goal 1 (OT) Patient will demonstrate fair endurance to support ADLs/functional transfers.  -LF     Time Frame (Problem Specific Goal 1, OT) long term goal (LTG);10 days  -LF     Row Name 04/20/23 1320          Therapy Assessment/Plan (OT)    Planned Therapy Interventions (OT) activity tolerance training;patient/caregiver education/training;BADL retraining;functional balance retraining;occupation/activity based interventions;strengthening exercise;transfer/mobility retraining  -           User Key  (r) = Recorded By, (t) = Taken By, (c) = Cosigned By    Initials Name Provider Type    LF Marnada Bowden OT Occupational Therapist                Clinical Impression     Row Name 04/20/23 1319          Pain Assessment    Additional Documentation Pain Scale: FACES Pre/Post-Treatment (Group)  -     Row Name 04/20/23 1319          Pain Scale: FACES Pre/Post-Treatment    Pain: FACES Scale, Pretreatment 0-->no hurt  -LF     Posttreatment Pain Rating 0-->no hurt  -LF     Row Name 04/20/23 1319          Plan of Care Review    Plan of Care Reviewed With patient;daughter  -     Progress no change  -     Outcome Evaluation Patient presents with limitations in self-care, functional transfers, balance, endurance, and BUE strength. She would benefit from continued skilled occupational therapy services to maximize independence with ADLs/functional transfers. Subacute rehab recommended upon discharge from hospital.  -     Row Name 04/20/23 1319          Therapy Assessment/Plan (OT)    Patient/Family Therapy Goal Statement (OT) To maximize independence.  -     Rehab Potential (OT) fair, will monitor progress closely  -     Criteria for Skilled Therapeutic Interventions Met (OT) yes;skilled treatment is necessary;meets criteria  -     Therapy Frequency (OT) 5 times/wk  -     Row Name 04/20/23 1319          Therapy Plan Review/Discharge Plan (OT)    Anticipated Discharge Disposition (OT) sub acute care setting  -     Row Name 04/20/23 1319          Vital Signs    O2 Delivery Pre Treatment nasal cannula  -     O2 Delivery Intra Treatment nasal cannula  -     O2 Delivery Post Treatment nasal cannula  -LF           User Key  (r) = Recorded By, (t) = Taken By, (c) = Cosigned By    Initials Name Provider Type     Maranda Bowden, OT Occupational Therapist               Outcome Measures     Row Name 04/20/23 1321          How much help from another is currently needed...    Putting on and taking off regular lower body clothing? 2  -LF     Bathing (including washing, rinsing, and drying) 2  -LF     Toileting (which includes using toilet bed pan or urinal) 1   -LF     Putting on and taking off regular upper body clothing 2  -LF     Taking care of personal grooming (such as brushing teeth) 2  -LF     Eating meals 3  -LF     AM-PAC 6 Clicks Score (OT) 12  -LF     Row Name 04/20/23 1148 04/20/23 1100       How much help from another person do you currently need...    Turning from your back to your side while in flat bed without using bedrails? 3  -DP 3  -JR    Moving from lying on back to sitting on the side of a flat bed without bedrails? 2  -DP 3  -JR    Moving to and from a bed to a chair (including a wheelchair)? 2  -DP 2  -JR    Standing up from a chair using your arms (e.g., wheelchair, bedside chair)? 2  -DP 2  -JR    Climbing 3-5 steps with a railing? 2  -DP 2  -JR    To walk in hospital room? 2  -DP 2  -JR    AM-PAC 6 Clicks Score (PT) 13  -DP 14  -JR    Highest level of mobility 4 --> Transferred to chair/commode  -DP 4 --> Transferred to chair/commode  -JR    Row Name 04/20/23 1321 04/20/23 1148       Functional Assessment    Outcome Measure Options AM-PAC 6 Clicks Daily Activity (OT);Optimal Instrument  -LF AM-PAC 6 Clicks Basic Mobility (PT)  -DP    Row Name 04/20/23 1321          Optimal Instrument    Optimal Instrument Optimal - 3  -LF     Bending/Stooping 4  -LF     Standing 3  -LF     Reaching 2  -LF     From the list, choose the 3 activities you would most like to be able to do without any difficulty Bending/stooping;Standing;Reaching  -LF     Total Score Optimal - 3 9  -LF           User Key  (r) = Recorded By, (t) = Taken By, (c) = Cosigned By    Initials Name Provider Type    JR Ernestina Marie, RN Registered Nurse    Maranda Silver OT Occupational Therapist    El Chang, PT Physical Therapist                Occupational Therapy Education     Title: PT OT SLP Therapies (In Progress)     Topic: Occupational Therapy (In Progress)     Point: ADL training (In Progress)     Description:   Instruct learner(s) on proper safety adaptation and  remediation techniques during self care or transfers.   Instruct in proper use of assistive devices.              Learning Progress Summary           Patient Acceptance, E,TB, NR by  at 4/20/2023 1322   Family Acceptance, E,TB, NR by  at 4/20/2023 1322                   Point: Home exercise program (Not Started)     Description:   Instruct learner(s) on appropriate technique for monitoring, assisting and/or progressing therapeutic exercises/activities.              Learner Progress:  Not documented in this visit.          Point: Precautions (In Progress)     Description:   Instruct learner(s) on prescribed precautions during self-care and functional transfers.              Learning Progress Summary           Patient Acceptance, E,TB, NR by  at 4/20/2023 1322   Family Acceptance, E,TB, NR by  at 4/20/2023 1322                   Point: Body mechanics (In Progress)     Description:   Instruct learner(s) on proper positioning and spine alignment during self-care, functional mobility activities and/or exercises.              Learning Progress Summary           Patient Acceptance, E,TB, NR by  at 4/20/2023 1322   Family Acceptance, E,TB, NR by  at 4/20/2023 1322                               User Key     Initials Effective Dates Name Provider Type Discipline     06/16/21 -  Maranda Bowden OT Occupational Therapist OT              OT Recommendation and Plan  Planned Therapy Interventions (OT): activity tolerance training, patient/caregiver education/training, BADL retraining, functional balance retraining, occupation/activity based interventions, strengthening exercise, transfer/mobility retraining  Therapy Frequency (OT): 5 times/wk  Plan of Care Review  Plan of Care Reviewed With: patient, daughter  Progress: no change  Outcome Evaluation: Patient presents with limitations in self-care, functional transfers, balance, endurance, and BUE strength. She would benefit from continued skilled occupational  therapy services to maximize independence with ADLs/functional transfers. Subacute rehab recommended upon discharge from hospital.     Time Calculation:    Time Calculation- OT     Row Name 04/20/23 1323             Time Calculation- OT    OT Received On 04/20/23  -LF      OT Goal Re-Cert Due Date 04/29/23  -LF         Untimed Charges    OT Eval/Re-eval Minutes 33  -LF         Total Minutes    Untimed Charges Total Minutes 33  -LF       Total Minutes 33  -LF            User Key  (r) = Recorded By, (t) = Taken By, (c) = Cosigned By    Initials Name Provider Type    LF Maranda Bowden OT Occupational Therapist              Therapy Charges for Today     Code Description Service Date Service Provider Modifiers Qty    [id number] HC OT EVAL LOW COMPLEXITY 3 4/20/2023 Maranda Bowden OT GO 1               Maranda Bowden OT  4/20/2023

## 2023-04-20 NOTE — THERAPY EVALUATION
Acute Care - Physical Therapy Initial Evaluation   [place]     Patient Name: Bessy Leggett  : 1950  MRN: [id number]  Today's Date: 2023      Visit Dx: Admit date: 2023     Referring Physician: Dick Mitchell DO     Surgery Date:2023 - 2023   Procedure(s) (LRB):  BRONCHOSCOPY WITH BRONCHIOALVEOLAR LAVAGE/WASHINGS (N/A)         ICD-10-CM ICD-9-CM   1. COPD exacerbation  J44.1 491.21   2. Acute respiratory failure with hypoxia  J96.01 518.81   3. Pneumonia due to infectious organism, unspecified laterality, unspecified part of lung  J18.9 486   4. Mucus plugging of bronchi  T17.500A 519.19   5. Difficulty walking  R26.2 719.7     Patient Active Problem List   Diagnosis   • AAA (abdominal aortic aneurysm) without rupture   • Hypertension   • Anxiety   • Asthma   • Cataract   • COPD (chronic obstructive pulmonary disease)   • Depression   • Non-insulin dependent type 2 diabetes mellitus   • Family history of blood clots   • GERD (gastroesophageal reflux disease)   • History of AAA (abdominal aortic aneurysm) repair   • History of seizure   • Hypercholesterolemia   • IBS (irritable bowel syndrome)   • Low back pain   • Melena   • Epigastric pain   • Intestinal metaplasia of stomach   • Acute respiratory failure with hypoxia   • Severe Malnutrition (HCC)   • Pneumonia due to infectious organism   • Mucus plugging of bronchi     Past Medical History:   Diagnosis Date   • Anxiety 2018    PATIENT REPORTS A LONG HISTORY OF ANXIETY ALONG IWTH AGOURA PHOBIA. SHE HAS BEEN PREVIOUSLY BEEN SEEN BY PSYCH RECENTLY., HER XANAX WAS STOPPED. I WILL REFER THE PATIENT FOR EVALUATION BY MENTAL HEALTH SPECIALIST   • Asthma    • Cataract    • COPD (chronic obstructive pulmonary disease)    • Depression    • GERD (gastroesophageal reflux disease)    • Hypertension    • IBS (irritable bowel syndrome)    • Low back pain      Past Surgical History:   Procedure Laterality Date   • ABDOMINAL SURGERY     •  BRONCHOSCOPY N/A 4/18/2023    Procedure: BRONCHOSCOPY WITH BRONCHIOALVEOLAR LAVAGE/WASHINGS;  Surgeon: Cabrera Obando MD;  Location: [facility] ENDOSCOPY;  Service: Pulmonary;  Laterality: N/A;  MUCOUS PLUGGING   • COLONOSCOPY     • ENDOSCOPY N/A 4/5/2023    Procedure: ESOPHAGOGASTRODUODENOSCOPY WITH BIOPSIES;  Surgeon: Shaina Pastrana MD;  Location: [facility] ENDOSCOPY;  Service: Gastroenterology;  Laterality: N/A;  GASTRITIS  HIATAL HERNIA   • GALLBLADDER SURGERY     • HYSTERECTOMY     • VASCULAR SURGERY       PT Assessment (last 12 hours)     PT Evaluation and Treatment     Row Name 04/20/23 1135          Physical Therapy Time and Intention    Subjective Information no complaints  -DP     Document Type evaluation  -DP     Mode of Treatment individual therapy;physical therapy  -DP     Patient Effort good  -DP     Symptoms Noted During/After Treatment fatigue;shortness of breath  -DP     Row Name 04/20/23 1139          General Information    Patient Profile Reviewed yes  -DP     Patient Observations cooperative;agree to therapy  -DP     General Observations of Patient Patient is on 2 LPM of o2  -DP     Prior Level of Function min assist:;gait;transfer;bed mobility;ADL's  -DP     Equipment Currently Used at Home walker, rolling  -DP     Existing Precautions/Restrictions fall  -DP     Barriers to Rehab none identified  -DP     Row Name 04/20/23 1131          Living Environment    Current Living Arrangements home  -DP     People in Home child(anthony), adult  -DP     Primary Care Provided by child(anthony)  -DP     Row Name 04/20/23 1133          Cognition    Affect/Mental Status (Cognition) anxious;confused  -DP     Behavioral Issues (Cognition) difficulty managing stress;overwhelmed easily;verbal outbursts  -DP     Follows Commands (Cognition) follows one-step commands;75-90% accuracy;increased processing time needed;repetition of directions required;verbal cues/prompting required  -DP     Row Name 04/20/23 0217           Range of Motion (ROM)    Range of Motion ROM is WFL;bilateral lower extremities  -DP     Row Name 04/20/23 1135          Strength (Manual Muscle Testing)    Strength (Manual Muscle Testing) other (see comments)  Not tested due to patient's anxiety and confusion at the time of evaluation.  -DP     Row Name 04/20/23 1135          Bed Mobility    Bed Mobility supine-sit  -DP     Supine-Sit [place] (Bed Mobility) moderate assist (50% patient effort);1 person assist  -DP     Assistive Device (Bed Mobility) draw sheet;head of bed elevated  -DP     Row Name 04/20/23 1135          Transfers    Transfers bed-chair transfer;sit-stand transfer  -DP     Row Name 04/20/23 1135          Bed-Chair Transfer    Bed-Chair [place] (Transfers) moderate assist (50% patient effort);2 person assist  -DP     Assistive Device (Bed-Chair Transfers) walker, front-wheeled  -DP     Row Name 04/20/23 1135          Sit-Stand Transfer    Sit-Stand [place] (Transfers) moderate assist (50% patient effort);2 person assist  -DP     Assistive Device (Sit-Stand Transfers) walker, front-wheeled  -DP     Row Name 04/20/23 1135          Gait/Stairs (Locomotion)    Gait/Stairs Locomotion gait/ambulation assistive device  -DP     [place] Level (Gait) moderate assist (50% patient effort);2 person assist  -DP     Assistive Device (Gait) walker, front-wheeled  -DP     Distance in Feet (Gait) 5  -DP     Deviations/Abnormal Patterns (Gait) base of support, narrow;gait speed decreased;stride length decreased  -DP     Row Name 04/20/23 1135          Safety Issues, Functional Mobility    Impairments Affecting Function (Mobility) balance;cognition;endurance/activity tolerance;strength  -DP     Row Name 04/20/23 1135          Balance    Balance Assessment standing dynamic balance  -DP     Dynamic Standing Balance moderate assist;2-person assist  -DP     Position/Device Used, Standing Balance supported;walker, front-wheeled  -DP     Row Name  04/20/23 1135          Plan of Care Review    Plan of Care Reviewed With patient  -DP     Outcome Evaluation Patient presents with deficits including balance, endurance, strength, bed mobility, transfers and ambulation. She will benefit from physical therapy services while in the hospital. Upon discharge, it is recommended she goes to a rehab facility.  -DP     Row Name 04/20/23 1135          Vital Signs    Pre SpO2 (%) 96  -DP     O2 Delivery Pre Treatment nasal cannula  -DP     Intra SpO2 (%) 86  -DP     O2 Delivery Intra Treatment nasal cannula  -DP     Post SpO2 (%) 95  -DP     O2 Delivery Post Treatment nasal cannula  -DP     Pre Patient Position Supine  -DP     Post Patient Position Sitting  In the recliner  -DP     Row Name 04/20/23 1132          Therapy Assessment/Plan (PT)    Rehab Potential (PT) good, to achieve stated therapy goals  -DP     Criteria for Skilled Interventions Met (PT) skilled treatment is necessary  -DP     Therapy Frequency (PT) daily  -DP     Predicted Duration of Therapy Intervention (PT) 10 days  -DP     Problem List (PT) problems related to;balance;mobility;strength  -DP     Activity Limitations Related to Problem List (PT) unable to ambulate safely  -DP     Row Name 04/20/23 1135          PT Evaluation Complexity    History, PT Evaluation Complexity 1-2 personal factors and/or comorbidities  -DP     Examination of Body Systems (PT Eval Complexity) total of 4 or more elements  -DP     Clinical Presentation (PT Evaluation Complexity) stable  -DP     Clinical Decision Making (PT Evaluation Complexity) low complexity  -DP     Overall Complexity (PT Evaluation Complexity) low complexity  -DP     Row Name 04/20/23 1135          Therapy Plan Review/Discharge Plan (PT)    Therapy Plan Review (PT) evaluation/treatment results reviewed;patient  -DP     Row Name 04/20/23 1135          Physical Therapy Goals    Bed Mobility Goal Selection (PT) bed mobility, PT goal 1  -DP     Transfer Goal  Selection (PT) transfer, PT goal 1  -DP     Gait Training Goal Selection (PT) gait training, PT goal 1  -DP     Row Name 04/20/23 1135          Bed Mobility Goal 1 (PT)    Activity/Assistive Device (Bed Mobility Goal 1, PT) bed mobility activities, all  -DP     [place] Level/Cues Needed (Bed Mobility Goal 1, PT) contact guard required  -DP     Time Frame (Bed Mobility Goal 1, PT) 10 days  -DP     Row Name 04/20/23 1135          Transfer Goal 1 (PT)    Activity/Assistive Device (Transfer Goal 1, PT) sit-to-stand/stand-to-sit;bed-to-chair/chair-to-bed  -DP     [place] Level/Cues Needed (Transfer Goal 1, PT) contact guard required  -DP     Time Frame (Transfer Goal 1, PT) 10 days  -DP     Row Name 04/20/23 1135          Gait Training Goal 1 (PT)    Activity/Assistive Device (Gait Training Goal 1, PT) gait (walking locomotion);assistive device use;improve balance and speed;increase endurance/gait distance;walker, rolling  -DP     [place] Level (Gait Training Goal 1, PT) contact guard required  -DP     Distance (Gait Training Goal 1, PT) 50  -DP     Time Frame (Gait Training Goal 1, PT) 10 days  -DP           User Key  (r) = Recorded By, (t) = Taken By, (c) = Cosigned By    Initials Name Provider Type    El Chang, PT Physical Therapist                  PT Recommendation and Plan  Anticipated Discharge Disposition (PT): inpatient rehabilitation facility, sub acute care setting  Planned Therapy Interventions (PT): balance training, bed mobility training, gait training, motor coordination training, neuromuscular re-education, strengthening, transfer training  Therapy Frequency (PT): daily  Plan of Care Reviewed With: patient  Outcome Evaluation: Patient presents with deficits including balance, endurance, strength, bed mobility, transfers and ambulation. She will benefit from physical therapy services while in the hospital. Upon discharge, it is recommended she goes to a rehab facility.   Outcome  Measures     Row Name 04/20/23 1148             How much help from another person do you currently need...    Turning from your back to your side while in flat bed without using bedrails? 3  -DP      Moving from lying on back to sitting on the side of a flat bed without bedrails? 2  -DP      Moving to and from a bed to a chair (including a wheelchair)? 2  -DP      Standing up from a chair using your arms (e.g., wheelchair, bedside chair)? 2  -DP      Climbing 3-5 steps with a railing? 2  -DP      To walk in hospital room? 2  -DP      AM-PAC 6 Clicks Score (PT) 13  -DP         Functional Assessment    Outcome Measure Options AM-PAC 6 Clicks Basic Mobility (PT)  -DP            User Key  (r) = Recorded By, (t) = Taken By, (c) = Cosigned By    Initials Name Provider Type    El Chang, PT Physical Therapist                 Time Calculation:    PT Charges     Row Name 04/20/23 1150             Time Calculation    PT Received On 04/20/23  -DP      PT Goal Re-Cert Due Date 04/29/23  -DP         Untimed Charges    PT Eval/Re-eval Minutes 50  -DP         Total Minutes    Untimed Charges Total Minutes 50  -DP       Total Minutes 50  -DP            User Key  (r) = Recorded By, (t) = Taken By, (c) = Cosigned By    Initials Name Provider Type    El Chang, PT Physical Therapist              Therapy Charges for Today     Code Description Service Date Service Provider Modifiers Qty    03710046150 HC PT EVAL LOW COMPLEXITY 4 4/20/2023 El Marks, PT GP 1          PT G-Codes  Outcome Measure Options: AM-PAC 6 Clicks Basic Mobility (PT)  AM-PAC 6 Clicks Score (PT): 13    El Marks, PT  4/20/2023

## 2023-04-20 NOTE — CONSULTS
"Nutrition Services    Patient Name: Bessy Leggett  YOB: 1950  MRN: [id number]  Admission date: 4/16/2023      CLINICAL NUTRITION ASSESSMENT      Reason for Assessment  MST score 2+   H&P:    Past Medical History:   Diagnosis Date   • Anxiety 05/23/2018    PATIENT REPORTS A LONG HISTORY OF ANXIETY ALONG IWTH AGOURA PHOBIA. SHE HAS BEEN PREVIOUSLY BEEN SEEN BY PSYCH RECENTLY., HER XANAX WAS STOPPED. I WILL REFER THE PATIENT FOR EVALUATION BY MENTAL HEALTH SPECIALIST   • Asthma    • Cataract    • COPD (chronic obstructive pulmonary disease)    • Depression    • GERD (gastroesophageal reflux disease)    • Hypertension    • IBS (irritable bowel syndrome)    • Low back pain         Current Problems:   Active Hospital Problems    Diagnosis    • **Acute respiratory failure with hypoxia    • Severe Malnutrition (HCC)    • Pneumonia due to infectious organism    • Mucus plugging of bronchi    • COPD (chronic obstructive pulmonary disease)         Nutrition/Diet History         Narrative     Nutrition follow up. Diet downgraded to soft to chew w/ chopped meats. Pt w/ 75% meal intake x 2 days. No new wts. Electrolytes WDL. Last BM yesterday 4/19/23.     Per nursing notes, patient has been anxious and paranoid. Physician notes reveal pt remains confused. Will continue current diet intervention and monitor per protocol.      Anthropometrics        Current Height, Weight Height: 157.5 cm (62\")  Weight: 49.4 kg (108 lb 14.5 oz)   Current BMI Body mass index is 19.92 kg/m².       Weight Hx  Wt Readings from Last 30 Encounters:   04/16/23 1320 49.4 kg (108 lb 14.5 oz)   04/04/23 2100 49.6 kg (109 lb 5.6 oz)   04/04/23 1614 47.6 kg (105 lb)   10/21/22 1131 57.2 kg (126 lb)   05/01/22 1229 62 kg (136 lb 11 oz)   12/17/21 0323 63.2 kg (139 lb 5.3 oz)   01/13/21 0000 59.9 kg (132 lb)   01/10/20 0000 51 kg (112 lb 6 oz)   12/17/19 0000 50.6 kg (111 lb 8 oz)   09/23/19 0000 50.4 kg (111 lb 2 oz)   07/02/19 0000 47.7 kg (105 " lb 2 oz)   04/12/19 0000 47.3 kg (104 lb 6 oz)   01/29/19 0000 49.4 kg (109 lb)   12/14/18 0000 47.2 kg (104 lb)   10/23/18 0000 49.4 kg (109 lb)   10/19/18 0000 49.4 kg (109 lb)   09/17/18 0000 50.3 kg (111 lb)   07/06/18 0000 53.5 kg (118 lb)   06/07/18 0000 54.7 kg (120 lb 8 oz)   05/23/18 0000 58.3 kg (128 lb 8 oz)   03/14/18 0000 56.7 kg (125 lb)   03/05/18 0000 57.6 kg (127 lb)            Wt Change Observation 14.3% decrease x 6 months     Estimated/Assessed Needs       Energy Requirements 30-35 kcal/kg   EST Needs (kcal/day) 2838-0579 kcal       Protein Requirements 1.2-1.5 g/kg   EST Daily Needs (g/day) 59-74 g       Fluid Requirements 1 ml/kcal    Estimated Needs (mL/day) 8588-0386 ml     Labs/Medications         Pertinent Labs Reviewed.   Results from last 7 days   Lab Units 04/20/23 0448 04/18/23  0140 04/17/23  1255 04/17/23  0200 04/16/23  1338   SODIUM mmol/L 140 140 135*   < > 136   POTASSIUM mmol/L 3.5 3.9 3.7   < > 3.5   CHLORIDE mmol/L 106 105 103   < > 97*   CO2 mmol/L 27.1 26.7 22.0   < > 24.8   BUN mg/dL 17 10 11   < > 11   CREATININE mg/dL 0.74 0.83 0.73   < > 0.96   CALCIUM mg/dL 8.9 8.9 9.0   < > 10.0   BILIRUBIN mg/dL  --   --  0.3  --  0.5   ALK PHOS U/L  --   --  52  --  72   ALT (SGPT) U/L  --   --  5  --  6   AST (SGOT) U/L  --   --  12  --  13   GLUCOSE mg/dL 113* 107* 93   < > 131*    < > = values in this interval not displayed.     Results from last 7 days   Lab Units 04/20/23 0448 04/17/23  1255 04/17/23  0200 04/16/23  1338   MAGNESIUM mg/dL  --   --  1.7 1.8   HEMOGLOBIN g/dL 12.5   < > 14.2 17.1*   HEMATOCRIT % 37.0   < > 40.9 50.0*    < > = values in this interval not displayed.     COVID19   Date Value Ref Range Status   05/01/2022 Not Detected Not Detected - Ref. Range Final     Lab Results   Component Value Date    HGBA1C 5.30 10/21/2022         Pertinent Medications Reviewed.     Current Nutrition Orders & Evaluation of Intake       Oral Nutrition     Current PO Diet Diet:  Cardiac Diets; Healthy Heart (2-3 Na+); Texture: Soft to Chew (NDD 3); Soft to Chew: Chopped Meat; Fluid Consistency: Thin (IDDSI 0)   Supplement Orders Placed This Encounter      Dietary Nutrition Supplements Boost Plus (Ensure Plus); vanilla       Malnutrition Severity Assessment      Patient meets criteria for : Severe Malnutrition     Malnutrition Severity Assessment      Patient meets criteria for : Severe Malnutrition          Nutrition Diagnosis         Nutrition Dx Problem 1 Severe malnutrition related to increased nutrient needs due to catabolic disease as evidenced by inadequate energy intake., decreased appetite., unintended wt change., body composition changes., patient report., family report., report of minimal PO intake. and COPD     Nutrition Intervention         Boost Plus vanilla BID (+1080 kcal, 42 g pro)     Medical Nutrition Therapy/Nutrition Education          Learner     Readiness Patient and Family  Acceptance     Method     Response Explanation  Verbalizes understanding     Monitor/Evaluation        Monitor Per protocol, PO intake, Supplement intake, Pertinent labs, Weight, POC/GOC     Nutrition Discharge Plan         To be determined     Electronically signed by:  Jeimy Mae RD  04/20/23 13:57 EDT

## 2023-04-20 NOTE — PLAN OF CARE
Goal Outcome Evaluation:      Patient seen by neurology this shift. Her daughters have been at the bedside. She has been agitated, prn haldol given. Oxygen dropped with agitation, oxygen applied. She has a swallow consult ordered as patient struggles swallowing. She refused some of her meds.             Yes

## 2023-04-20 NOTE — PROGRESS NOTES
Pulmonary / Critical Care Progress Note      Patient Name: Bessy Leggett  : 1950  MRN: [id number]  Attending:  Dick Mitchell DO  Date of admission: 2023    Subjective   Subjective   Follow-up for pneumonia     Overnight, patient was anxious and paranoid requiring Xanax and Atarax.    This morning,  Lying in bed awake on 2 L NC with SPO2 95%  O2 resumed due to dyspnea  Family at bedside  Remains confused  Continues with congested cough  Remains weak and fatigued  No fever/chills    Review of Systems  General: fatigue and weakness, otherwise denied complaints  Cardiovascular:  Denied complaints  Respiratory: dyspnea, cough, otherwise denied complaints  Gastrointestinal: Denied complaints    Objective   Objective     Vitals:   Temp:  [97.5 °F (36.4 °C)-98.2 °F (36.8 °C)] 98.2 °F (36.8 °C)  Heart Rate:  [66-86] 83  Resp:  [18-20] 20  BP: (118-138)/(66-84) 138/66  Flow (L/min):  [2] 2    Physical Exam   Vital Signs Reviewed   General: Frail elderly female, Alert, NAD.    HEENT:  PERRL, EOMI.  OP, nares clear  Chest:  good aeration,  faint bilateral wheezing, tympanic to percussion bilaterally, no work of breathing noted  CV: RRR, no MGR, pulses 2+, equal.  Abd:  Soft, NT, ND, + BS, no HSM  EXT:  no clubbing, no cyanosis, no edema  Neuro:  A&Ox3, CN grossly intact, no focal deficits.  Skin: No rashes or lesions noted       Result Review    Result Review:  I have personally reviewed the results from the time of this admission to 2023 12:49 EDT and agree with these findings:  [x]  Laboratory  [x]  Microbiology  [x]  Radiology  [x]  EKG/Telemetry   []  Cardiology/Vascular   []  Pathology  [x]  Old records  []  Other:  Most notable findings include:      BAL cultures -NGTD, cytology negative        Lab 23  0448 23  0140 23  1255 23  0200 23  1338   WBC 6.89 8.35  --  6.09 12.24*   HEMOGLOBIN 12.5 13.3 13.2 14.2 17.1*   HEMATOCRIT 37.0 39.0 38.1 40.9 50.0*   PLATELETS 177  231  --  247 292   SODIUM 140 140 135* 135* 136   POTASSIUM 3.5 3.9 3.7 3.7 3.5   CHLORIDE 106 105 103 101 97*   CO2 27.1 26.7 22.0 24.8 24.8   BUN 17 10 11 13 11   CREATININE 0.74 0.83 0.73 0.80 0.96   GLUCOSE 113* 107* 93 181* 131*   CALCIUM 8.9 8.9 9.0 9.1 10.0   TOTAL PROTEIN  --   --  5.4*  --  7.2   ALBUMIN  --   --  3.4*  --  4.0   GLOBULIN  --   --  2.0  --  3.2       CT Chest With Contrast Diagnostic    Result Date: 4/16/2023  PROCEDURE: CT CHEST W CONTRAST DIAGNOSTIC  COMPARISON:  [facility], CR, XR CHEST 1 VW, 12/17/2021, 3:38.  [facility], CR, XR CHEST 1 VW, 5/01/2022, 13:07.  [facility], CR, XR CHEST 1 VW, 4/16/2023, 13:29.  [facility], CT, CHEST W/O CONTRAST, 11/09/2011, 14:11.  [facility], CT, CT ABDOMEN PELVIS W CONTRAST, 4/04/2023, 18:53. INDICATIONS: Shortness of breath and tachycardia  TECHNIQUE: After obtaining the patient's consent, CT images were obtained with non-ionic intravenous contrast material.   PROTOCOL:   Pulmonary embolism imaging protocol performed    RADIATION:   DLP: 244.4mGy*cm   Automated exposure control was utilized to minimize radiation dose. CONTRAST: 68cc Isovue 370 I.V. LABS:   eGFR: >60ml/min/1.73m2  FINDINGS:  No pulmonary embolism is identified.  A small left pleural effusion is noted.  A small pericardial effusion is evident.  No adenopathy is identified.  Consolidation is noted in the left lower lobe.  There is a prominent bulla noted in the medial aspect of the left lower lobe.  Marked emphysematous changes are noted.  There is an incompletely imaged fusiform abdominal aortic aneurysm measuring at least 5.1 cm AP.  A bifurcated stent graft has been placed.  A cholecystectomy has been performed.  In the right hepatic lobe on image 267 is a low-density mass consistent with a cyst.  In the left hepatic lobe at the same level is a 0.8 cm hypodense focus, suspected to represent a cyst but too small  for definitive characterization.  There is moderate to severe atrophy of the left kidney.  There are suspected bilateral renal cysts.  No focal osseous lesion is seen.      Impression:   1. No evidence of pulmonary embolism 2. Small left pleural and pericardial effusions 3. Consolidation in the left lower lobe may represent atelectasis or pneumonia. 4. Abdominal aortic aneurysm, incompletely imaged on this examination 5. Previous cholecystectomy 6. Moderate to severe atrophy of the left kidney 7. Marked emphysematous changes     Jermaine Biggs M.D.       Electronically Signed and Approved By: Jermaine Biggs M.D. on 4/16/2023 at 17:43                 Assessment & Plan   Assessment / Plan     Active Hospital Problems:  Active Hospital Problems    Diagnosis    • **Acute respiratory failure with hypoxia    • Severe Malnutrition (HCC)    • Pneumonia due to infectious organism    • Mucus plugging of bronchi    • COPD (chronic obstructive pulmonary disease)          Impression:  Acute hypoxic respiratory failure  Healthcare associat nosocomial pneumonia versus postobstructive pneumonia left lower lobe from unspecified organism  Mucous plugging versus endobronchial lesion left lower lobe  Question aspiration and airways  Lactic acidosis  Sepsis  Acute COPD exacerbation  Chronic dementia  Ongoing tobacco use of cigarettes  Left vocal cord polyp  Acute hyperactive delirium hypokalemia       Plan:  -Currently on 2 L per NC, continue to wean to maintain SPO2 greater than 90%  -Continue cefdinir 300 mg twice daily for 7-day total treatment  -BAL cultures NGTD, cytology negative  -Prednisone discontinued, this may help some with worsening confusion.  Continue delirium precautions and start as needed Haldol  -Continue Brovana, Pulmicort, Yupelri and albuterol nebulizers  -Continue bronchopulmonary hygiene  -Encourage I-S/flutter valve  -Potassium trending down, KCl 40 mEq p.o. x1 ordered, continue to monitor  -PT/OT on  board  -Encourage mobilization, out of bed to chair  -Small left vocal cord polyp incidentally noted on bronchoscopy, recommend outpatient ENT follow-up upon discharge       DVT prophylaxis:  Medical DVT prophylaxis orders are present.    CODE STATUS:   Level Of Support Discussed With: Health Care Surrogate; Next of Kin (If No Surrogate)  Code Status (Patient has no pulse and is not breathing): CPR (Attempt to Resuscitate)  Medical Interventions (Patient has pulse or is breathing): Full Support      Labs, imaging, microbiology, notes and medications personally reviewed  Discussed with primary    Electronically signed by ROSANGELA Camarillo, 04/20/23, 12:41 PM EDT.    I, Dr. Cabrera Obando, have spent more than 50% of the total time managing the patient in this encounter today.  This included personally reviewing all pertinent labs, imaging, microbiology and documentation. Also discussing the case with the patient and any available family, the admitting physician and any available ancillary staff.    Electronically signed by Cabrera Obando MD, 04/20/23, 12:54 PM EDT.

## 2023-04-20 NOTE — NURSING NOTE
After discussing the agitation of the patient with family, daughter requested that her midnight vitals be skipped and to allow the patient to rest. I explained that when the next set of vitals would be resumed and that we would continue our hourly safety checks. VSS

## 2023-04-20 NOTE — TELEPHONE ENCOUNTER
Pt daughter called in questioning why her appointment with [facility] was cancelled adv [facility] and I am unable to schedule it back. Her daughter states she is still in the hospital but is thinking she will be out by next week and would like to have her appt back. Her daughter adv pt is not well and does need to be evaluated sooner rather than later.     vfd contact number is correct. Can you please review and see if there is anything we can do for this pt.

## 2023-04-20 NOTE — CONSULTS
[facility]   Neurology Consult Note    Patient Name: Bessy Leggett  : 1950  MRN: [id number]  Primary Care Physician:  Erich Joya DO  Referring Physician: No ref. provider found  Date of admission: 2023    Subjective   Subjective     Reason for Consult/ Chief Complaint: MRI changes, memory loss, fluctuating anxiety.    HPI:  Bessy Leggett is a 73 y.o. female evaluated for MRI changes, memory loss, fluctuation anxiety.  According to the daughter the patient has been living with her for 5 years and needs assistance for all activities of daily living.  She has been incontinent recently.  She gets upset and gets anxious and does not want to see physicians.  Patient has 3 daughters and the caregiver wants to get power of  status.  She has to help her with everything at home.  She is unable to make decisions on her own.  She had an MRI of the brain showing few age-indeterminate microhemorrhage in the cerebral hemisphere bilaterally.  She has no history of bleeding in the brain.  She also has severe small vessel ischemic changes in the white matter.  She has chronic left thalamic infarct.  There is moderate diffuse cerebral atrophy no significant change since the CT scan last year.  The patient gets intimately confused especially if she has an infection.      Personal History     Past Medical History:   Diagnosis Date   • Anxiety 2018    PATIENT REPORTS A LONG HISTORY OF ANXIETY ALONG IWTH AGOURA PHOBIA. SHE HAS BEEN PREVIOUSLY BEEN SEEN BY PSYCH RECENTLY., HER XANAX WAS STOPPED. I WILL REFER THE PATIENT FOR EVALUATION BY MENTAL HEALTH SPECIALIST   • Asthma    • Cataract    • COPD (chronic obstructive pulmonary disease)    • Depression    • GERD (gastroesophageal reflux disease)    • Hypertension    • IBS (irritable bowel syndrome)    • Low back pain        Past Surgical History:   Procedure Laterality Date   • ABDOMINAL SURGERY     • BRONCHOSCOPY N/A 2023    Procedure:  BRONCHOSCOPY WITH BRONCHIOALVEOLAR LAVAGE/WASHINGS;  Surgeon: Cabrera Obando MD;  Location: [facility] ENDOSCOPY;  Service: Pulmonary;  Laterality: N/A;  MUCOUS PLUGGING   • COLONOSCOPY     • ENDOSCOPY N/A 4/5/2023    Procedure: ESOPHAGOGASTRODUODENOSCOPY WITH BIOPSIES;  Surgeon: Shaina Pastrana MD;  Location: [facility] ENDOSCOPY;  Service: Gastroenterology;  Laterality: N/A;  GASTRITIS  HIATAL HERNIA   • GALLBLADDER SURGERY     • HYSTERECTOMY     • VASCULAR SURGERY         Family History: family history includes Cancer in an other family member; Diabetes in an other family member; Heart disease in an other family member; Hypertension in an other family member; Stroke in an other family member. Otherwise pertinent FHx was reviewed and not pertinent to current issue.    Social History:  reports that she has been smoking. She has never used smokeless tobacco. She reports that she does not drink alcohol and does not use drugs.    Home Medications:  Umeclidinium-Vilanterol, albuterol sulfate HFA, aspirin, citalopram, lisinopril, omeprazole, and pravastatin    Allergies:  Allergies   Allergen Reactions   • Penicillins Shortness Of Breath, Itching, Swelling and Rash     Has tolerated ceftriaxone, cefdinir, and cefepime -Tyrell Pierce [facility]   • Buspirone Unknown - High Severity   • Ibuprofen Irritability   • Rofecoxib Unknown - Low Severity   • Sumatriptan Unknown - High Severity   • Tramadol Unknown - High Severity   • Tylenol With Codeine #3 [Acetaminophen-Codeine] Unknown - High Severity       Objective    Objective     Vitals:   Temp:  [97.5 °F (36.4 °C)-98.2 °F (36.8 °C)] 98.2 °F (36.8 °C)  Heart Rate:  [66-86] 83  Resp:  [18-20] 20  BP: (118-138)/(66-84) 138/66  Flow (L/min):  [2] 2    Physical Exam: She is disoriented to place, time.  She can tell me her daughter's name with some difficulty and cannot tell me her birthday.  She then said it was her other daughter's birthday.  She knows that she lives in [facility].  She cannot tell me that she is in a hospital in [place].  She has no focal weakness.      Result Review    Result Review:  I have personally reviewed the results from the time of this admission to 4/20/2023 14:35 EDT and agree with these findings:  []  Laboratory  []  Microbiology  [x]  Radiology  []  EKG/Telemetry   []  Cardiology/Vascular   []  Pathology  [x]  Old records  []  Other:      Assessment & Plan   Assessment / Plan   Active Hospital Problems:  Active Hospital Problems    Diagnosis    • **Acute respiratory failure with hypoxia    • Vascular dementia without behavioral disturbance    • Vascular dementia with behavior disturbance    • Severe Malnutrition (HCC)    • Pneumonia due to infectious organism    • Mucus plugging of bronchi    • COPD (chronic obstructive pulmonary disease)          Plan: I discussed with her daughter that her history is consistent with vascular dementia with waxing and waning mental status and getting worse.  The MRI shows severe white matter changes.  I discussed with her that the microhemorrhages are of no clinical significance since she is never had any history of intracerebral bleed.  I discussed with her that she needs to go to the court house to ask for power of  and medical power of .  The patient needs to be evaluated to be declared incompetent to manage her own affairs which is usually done by a psychiatrist.  She can follow-up in our clinic the future to see nurse practitioner Paz Cisneros which she is scheduled for to see in 4 months.    Total time spent with the patient and coordinating patient care was 25 minutes.    electronically signed by Martin Moreira MD, 04/20/23, 2:34 PM EDT.

## 2023-04-20 NOTE — PLAN OF CARE
Goal Outcome Evaluation:  Plan of Care Reviewed With: patient           Outcome Evaluation: Patient presents with deficits including balance, endurance, strength, bed mobility, transfers and ambulation. She will benefit from physical therapy services while in the hospital. Upon discharge, it is recommended she goes to a rehab facility.

## 2023-04-21 ENCOUNTER — APPOINTMENT (OUTPATIENT)
Dept: NEUROLOGY | Facility: HOSPITAL | Age: 73
End: 2023-04-21
Payer: MEDICARE

## 2023-04-21 LAB
ANION GAP SERPL CALCULATED.3IONS-SCNC: 10.8 MMOL/L (ref 5–15)
BACTERIA SPEC AEROBE CULT: NORMAL
BACTERIA SPEC AEROBE CULT: NORMAL
BASOPHILS # BLD AUTO: 0.02 10*3/MM3 (ref 0–0.2)
BASOPHILS NFR BLD AUTO: 0.3 % (ref 0–1.5)
BUN SERPL-MCNC: 13 MG/DL (ref 8–23)
BUN/CREAT SERPL: 19.1 (ref 7–25)
CALCIUM SPEC-SCNC: 9.5 MG/DL (ref 8.6–10.5)
CHLORIDE SERPL-SCNC: 103 MMOL/L (ref 98–107)
CO2 SERPL-SCNC: 25.2 MMOL/L (ref 22–29)
CREAT SERPL-MCNC: 0.68 MG/DL (ref 0.57–1)
DEPRECATED RDW RBC AUTO: 42.1 FL (ref 37–54)
EGFRCR SERPLBLD CKD-EPI 2021: 92.7 ML/MIN/1.73
EOSINOPHIL # BLD AUTO: 0.04 10*3/MM3 (ref 0–0.4)
EOSINOPHIL NFR BLD AUTO: 0.5 % (ref 0.3–6.2)
ERYTHROCYTE [DISTWIDTH] IN BLOOD BY AUTOMATED COUNT: 12.8 % (ref 12.3–15.4)
GLUCOSE BLDC GLUCOMTR-MCNC: 94 MG/DL (ref 70–99)
GLUCOSE SERPL-MCNC: 92 MG/DL (ref 65–99)
HCT VFR BLD AUTO: 42.3 % (ref 34–46.6)
HGB BLD-MCNC: 14.5 G/DL (ref 12–15.9)
IMM GRANULOCYTES # BLD AUTO: 0.04 10*3/MM3 (ref 0–0.05)
IMM GRANULOCYTES NFR BLD AUTO: 0.5 % (ref 0–0.5)
LYMPHOCYTES # BLD AUTO: 1.98 10*3/MM3 (ref 0.7–3.1)
LYMPHOCYTES NFR BLD AUTO: 25.1 % (ref 19.6–45.3)
MCH RBC QN AUTO: 30.9 PG (ref 26.6–33)
MCHC RBC AUTO-ENTMCNC: 34.3 G/DL (ref 31.5–35.7)
MCV RBC AUTO: 90.2 FL (ref 79–97)
MONOCYTES # BLD AUTO: 0.64 10*3/MM3 (ref 0.1–0.9)
MONOCYTES NFR BLD AUTO: 8.1 % (ref 5–12)
NEUTROPHILS NFR BLD AUTO: 5.17 10*3/MM3 (ref 1.7–7)
NEUTROPHILS NFR BLD AUTO: 65.5 % (ref 42.7–76)
NRBC BLD AUTO-RTO: 0 /100 WBC (ref 0–0.2)
PLATELET # BLD AUTO: 213 10*3/MM3 (ref 140–450)
PMV BLD AUTO: 8.9 FL (ref 6–12)
POTASSIUM SERPL-SCNC: 3.9 MMOL/L (ref 3.5–5.2)
RBC # BLD AUTO: 4.69 10*6/MM3 (ref 3.77–5.28)
SODIUM SERPL-SCNC: 139 MMOL/L (ref 136–145)
WBC NRBC COR # BLD: 7.89 10*3/MM3 (ref 3.4–10.8)

## 2023-04-21 PROCEDURE — 63710000001 REVEFENACIN 175 MCG/3ML SOLUTION: Performed by: INTERNAL MEDICINE

## 2023-04-21 PROCEDURE — 95819 EEG AWAKE AND ASLEEP: CPT

## 2023-04-21 PROCEDURE — 94799 UNLISTED PULMONARY SVC/PX: CPT

## 2023-04-21 PROCEDURE — 94668 MNPJ CHEST WALL SBSQ: CPT

## 2023-04-21 PROCEDURE — 25010000002 HYDRALAZINE PER 20 MG: Performed by: PHYSICIAN ASSISTANT

## 2023-04-21 PROCEDURE — 25010000002 METOCLOPRAMIDE PER 10 MG: Performed by: FAMILY MEDICINE

## 2023-04-21 PROCEDURE — 94761 N-INVAS EAR/PLS OXIMETRY MLT: CPT

## 2023-04-21 PROCEDURE — 94664 DEMO&/EVAL PT USE INHALER: CPT

## 2023-04-21 PROCEDURE — 99233 SBSQ HOSP IP/OBS HIGH 50: CPT | Performed by: FAMILY MEDICINE

## 2023-04-21 PROCEDURE — 25010000002 ENOXAPARIN PER 10 MG: Performed by: INTERNAL MEDICINE

## 2023-04-21 PROCEDURE — 82962 GLUCOSE BLOOD TEST: CPT

## 2023-04-21 PROCEDURE — 99233 SBSQ HOSP IP/OBS HIGH 50: CPT | Performed by: INTERNAL MEDICINE

## 2023-04-21 PROCEDURE — 80048 BASIC METABOLIC PNL TOTAL CA: CPT | Performed by: FAMILY MEDICINE

## 2023-04-21 PROCEDURE — 85025 COMPLETE CBC W/AUTO DIFF WBC: CPT | Performed by: FAMILY MEDICINE

## 2023-04-21 PROCEDURE — 92610 EVALUATE SWALLOWING FUNCTION: CPT

## 2023-04-21 RX ORDER — METOCLOPRAMIDE HYDROCHLORIDE 5 MG/ML
10 INJECTION INTRAMUSCULAR; INTRAVENOUS ONCE
Status: DISCONTINUED | OUTPATIENT
Start: 2023-04-21 | End: 2023-04-21 | Stop reason: SDUPTHER

## 2023-04-21 RX ORDER — METOCLOPRAMIDE HYDROCHLORIDE 5 MG/ML
10 INJECTION INTRAMUSCULAR; INTRAVENOUS ONCE
Status: COMPLETED | OUTPATIENT
Start: 2023-04-21 | End: 2023-04-21

## 2023-04-21 RX ORDER — HYDRALAZINE HYDROCHLORIDE 20 MG/ML
10 INJECTION INTRAMUSCULAR; INTRAVENOUS ONCE
Status: COMPLETED | OUTPATIENT
Start: 2023-04-21 | End: 2023-04-21

## 2023-04-21 RX ADMIN — METOCLOPRAMIDE 10 MG: 5 INJECTION, SOLUTION INTRAMUSCULAR; INTRAVENOUS at 15:20

## 2023-04-21 RX ADMIN — ENOXAPARIN SODIUM 30 MG: 100 INJECTION SUBCUTANEOUS at 09:35

## 2023-04-21 RX ADMIN — CEFDINIR 300 MG: 300 CAPSULE ORAL at 20:21

## 2023-04-21 RX ADMIN — PRAVASTATIN SODIUM 40 MG: 40 TABLET ORAL at 20:21

## 2023-04-21 RX ADMIN — ARFORMOTEROL TARTRATE 15 MCG: 15 SOLUTION RESPIRATORY (INHALATION) at 07:03

## 2023-04-21 RX ADMIN — LISINOPRIL 20 MG: 20 TABLET ORAL at 16:48

## 2023-04-21 RX ADMIN — Medication 10 ML: at 20:21

## 2023-04-21 RX ADMIN — BUDESONIDE 0.5 MG: 0.5 SUSPENSION RESPIRATORY (INHALATION) at 07:03

## 2023-04-21 RX ADMIN — REVEFENACIN 175 MCG: 175 SOLUTION RESPIRATORY (INHALATION) at 07:03

## 2023-04-21 RX ADMIN — SENNOSIDES AND DOCUSATE SODIUM 2 TABLET: 8.6; 5 TABLET ORAL at 20:21

## 2023-04-21 RX ADMIN — HYDRALAZINE HYDROCHLORIDE 10 MG: 20 INJECTION, SOLUTION INTRAMUSCULAR; INTRAVENOUS at 21:14

## 2023-04-21 RX ADMIN — BUDESONIDE 0.5 MG: 0.5 SUSPENSION RESPIRATORY (INHALATION) at 19:00

## 2023-04-21 RX ADMIN — ARFORMOTEROL TARTRATE 15 MCG: 15 SOLUTION RESPIRATORY (INHALATION) at 19:00

## 2023-04-21 RX ADMIN — Medication 10 ML: at 09:35

## 2023-04-21 NOTE — CONSULTS
"Patient was seen and evaluated today by me. Her chart was reviewed.     Patient Care Team:  Erich Joya DO as PCP - General (Family Medicine)  Keysha Noonan RN as Ambulatory  ([facility])    Subjective     Chief complaint:  \"I don't know.\"     Subjective      Ms. Leggett is a 73 year-old female with a history of depression, anxiety, COPD, GERD, HTN, IBS, chronic back pain, and dementia who has been admitted to the medical floor after a recent shortness of air and altered mental status, currently treated for respiratory failure, metabolic encephalopathy, elevated troponin. Psychiatry has been consulted for recommendations regarding management of anxiety and behavioral outbursts.    This morning, patient observed to be lying in bed with periods of calmness and falling asleep and other moments appearing mildly restless but able to be reassured. No overt agitation observed. Patient able to state her name but states she does not know where she is and does not know the date. She denies any pain. She is able to follow some simple commands. She does not appear in any overt distress. She does not demonstrate much understanding of her current situation.     Patient was seen and evaluated in the presence of her daughter, Pat De Anda, who indicated concern patient is unable to sign Power or  documents.     Review Of Systems:    Patient denies any pain. She does not respond to other review of systems questions.      Past Medical History:   Diagnosis Date   • Anxiety 05/23/2018    PATIENT REPORTS A LONG HISTORY OF ANXIETY ALONG IWTH AGOURA PHOBIA. SHE HAS BEEN PREVIOUSLY BEEN SEEN BY PSYCH RECENTLY., HER XANAX WAS STOPPED. I WILL REFER THE PATIENT FOR EVALUATION BY MENTAL HEALTH SPECIALIST   • Asthma    • Cataract    • COPD (chronic obstructive pulmonary disease)    • Depression    • GERD (gastroesophageal reflux disease)    • Hypertension    • IBS (irritable bowel syndrome)    • Low back pain    , "   Past Surgical History:   Procedure Laterality Date   • ABDOMINAL SURGERY     • BRONCHOSCOPY N/A 4/18/2023    Procedure: BRONCHOSCOPY WITH BRONCHIOALVEOLAR LAVAGE/WASHINGS;  Surgeon: Cabrera Obando MD;  Location: [facility] ENDOSCOPY;  Service: Pulmonary;  Laterality: N/A;  MUCOUS PLUGGING   • COLONOSCOPY     • ENDOSCOPY N/A 4/5/2023    Procedure: ESOPHAGOGASTRODUODENOSCOPY WITH BIOPSIES;  Surgeon: Shaina Pastrana MD;  Location: [facility] ENDOSCOPY;  Service: Gastroenterology;  Laterality: N/A;  GASTRITIS  HIATAL HERNIA   • GALLBLADDER SURGERY     • HYSTERECTOMY     • VASCULAR SURGERY     ,   Family History   Problem Relation Age of Onset   • Stroke Other    • Heart disease Other    • Hypertension Other    • Cancer Other    • Diabetes Other    ,   Social History     Socioeconomic History   • Marital status: Legally    Tobacco Use   • Smoking status: Every Day   • Smokeless tobacco: Never   Vaping Use   • Vaping Use: Unknown   Substance and Sexual Activity   • Alcohol use: Never   • Drug use: Never   • Sexual activity: Defer     E-cigarette/Vaping   • E-cigarette/Vaping Use Unknown If Ever Used      E-cigarette/Vaping Substances     E-cigarette/Vaping Devices       ,   Medications Prior to Admission   Medication Sig Dispense Refill Last Dose   • albuterol sulfate  (90 Base) MCG/ACT inhaler Inhale 2 puffs Every 4 (Four) Hours As Needed for Wheezing. 18 g 2    • aspirin 325 MG tablet Take 1 tablet by mouth As Needed for Mild Pain.   4/16/2023   • citalopram (CeleXA) 20 MG tablet Take 1 tablet by mouth Every Morning for 90 days. 90 tablet 1    • lisinopril (PRINIVIL,ZESTRIL) 20 MG tablet Take 1 tablet by mouth Daily. 90 tablet 1    • omeprazole (priLOSEC) 40 MG capsule Take 1 capsule by mouth Daily for 30 days. 30 capsule 0    • pravastatin (PRAVACHOL) 40 MG tablet Take 1 tablet by mouth Daily. 90 tablet 1    • Umeclidinium-Vilanterol (Anoro Ellipta) 62.5-25 MCG/ACT aerosol powder  inhaler Inhale 1  "puff Daily. 60 each 2    , Scheduled Meds:  arformoterol, 15 mcg, Nebulization, BID - RT  aspirin, 324 mg, Oral, Once  budesonide, 0.5 mg, Nebulization, BID - RT  cefdinir, 300 mg, Oral, Q12H  citalopram, 20 mg, Oral, QAM  enoxaparin, 30 mg, Subcutaneous, Daily  lisinopril, 20 mg, Oral, Daily  pantoprazole, 40 mg, Oral, Q AM  pravastatin, 40 mg, Oral, Nightly  revefenacin, 175 mcg, Nebulization, Daily - RT  senna-docusate sodium, 2 tablet, Oral, BID  sodium chloride, 10 mL, Intravenous, Q12H    , Continuous Infusions:   , PRN Meds:  •  acetaminophen  •  albuterol  •  ALPRAZolam  •  aluminum-magnesium hydroxide-simethicone  •  senna-docusate sodium **AND** polyethylene glycol **AND** bisacodyl **AND** bisacodyl  •  haloperidol lactate  •  melatonin  •  nitroglycerin  •  sodium chloride  •  sodium chloride  •  sodium chloride and Allergies:  Penicillins, Buspirone, Ibuprofen, Rofecoxib, Sumatriptan, Tramadol, and Tylenol with codeine #3 [acetaminophen-codeine]    Objective     Vital Signs  Temp:  [97.3 °F (36.3 °C)-98.2 °F (36.8 °C)] 97.3 °F (36.3 °C)  Heart Rate:  [] 99  Resp:  [18-24] 18  BP: (141-166)/(87-94) 146/89    Physical Exam    Mental Status Exam:    Hygiene:   fair  Cooperation:  Appears to attempt to cooperate  Eye Contact:  Poor  Psychomotor Behavior:  Variable; calm at times, other times with some restlessness  Affect:  Restricted  Hopelessness: Denies  Speech:  Normal  Goal directed  Thought Content:  Minimal  Suicidal:  None  Homicidal:  None  Hallucinations:  None  Delusion:  Other No overt paranoia or delusional thought content voiced  Memory:  Deficits  Orientation:  Oriented to person only; not to place or time  Reliability:  poor  Insight:  Poor  Judgement:  Impaired  Impulse Control:  Impaired    Please note above that speech is minimal, often stating \"I don't know\" and other times not responding to questions.     Medications and allergies reviewed Reviewed      Assessment & Plan       " Acute respiratory failure with hypoxia    COPD (chronic obstructive pulmonary disease)    Severe Malnutrition (HCC)    Pneumonia due to infectious organism    Mucus plugging of bronchi    Vascular dementia with behavior disturbance      Assessment & Plan    Assessment: Unspecified anxiety disorder, dementia  Treatment Plan: Patient's anxiety symptoms and agitation are currently being managed with Citalopram, prn Xanax; Melatonin for insomnia. Consider changing Citalopram to Sertraline at 25mg daily given Citalopram has higher risk of cardiac side effects and Sertraline has a wider dose range to adjust to better address anxiety symptoms. Consider changing Melatonin to Trazodone 25mg at bedtime as needed for insomnia as Trazodone can also better address anxiety symptoms compared to Melatonin. Finally, recommend avoiding benzodiazepines in elderly patients with dementia given the risk of falls, delirium, and behavioral inhibition in this population of patients. Low doses of antipsychotics (Risperdal, Seroquel, Olanzapine, or Haldol) are sometimes used to treat severe agitation, but the risks vs benefits of treatment should be considered given the black box warning for antipsychotics in use in dementia patients. Of course, with any of these medication changes, QTC should be monitored as these medications do carry risk of QTC prolongation. Patient does not appear to have the capacity to make decisions for herself. I would recommend family consider petitioning for guardianship. Can continue to follow.       I have reviewed and approved the behavioral health treatment plans and problem list. Yes

## 2023-04-21 NOTE — PLAN OF CARE
Goal Outcome Evaluation:           Progress: no change  Outcome Evaluation: Pt failed swallow eval this morning. Pt had coretrak placed post pyloric and tube feedings started at 1750. Pt turned q2h. Pt daughters at bedside. Pt has no complaints of nausea/vomitting/pain today.    Daiana Burger RN      patient c/o of rectal pain and bleed , patient denied abdominal pain

## 2023-04-21 NOTE — CONSULTS
"Nutrition Services    Patient Name: Bessy Leggett  YOB: 1950  MRN: [id number]  Admission date: 4/16/2023      CLINICAL NUTRITION ASSESSMENT      Reason for Assessment  Tube feeding consult   H&P:    Past Medical History:   Diagnosis Date   • Anxiety 05/23/2018    PATIENT REPORTS A LONG HISTORY OF ANXIETY ALONG IWTH AGOURA PHOBIA. SHE HAS BEEN PREVIOUSLY BEEN SEEN BY PSYCH RECENTLY., HER XANAX WAS STOPPED. I WILL REFER THE PATIENT FOR EVALUATION BY MENTAL HEALTH SPECIALIST   • Asthma    • Cataract    • COPD (chronic obstructive pulmonary disease)    • Depression    • GERD (gastroesophageal reflux disease)    • Hypertension    • IBS (irritable bowel syndrome)    • Low back pain         Current Problems:   Active Hospital Problems    Diagnosis    • **Acute respiratory failure with hypoxia    • Vascular dementia with behavior disturbance    • Severe Malnutrition (HCC)    • Pneumonia due to infectious organism    • Mucus plugging of bronchi    • COPD (chronic obstructive pulmonary disease)         Nutrition/Diet History          Pt  NPO. Speech recommended alternative feeding. Cortrak was placed today. Tube feeding consult received. Labs, meds reviewed.      Anthropometrics        Current Height, Weight Height: 157.5 cm (62\")  Weight: 49.4 kg (108 lb 14.5 oz)   Current BMI Body mass index is 19.92 kg/m².       Weight Hx  Wt Readings from Last 30 Encounters:   04/16/23 1320 49.4 kg (108 lb 14.5 oz)   04/04/23 2100 49.6 kg (109 lb 5.6 oz)   04/04/23 1614 47.6 kg (105 lb)   10/21/22 1131 57.2 kg (126 lb)   05/01/22 1229 62 kg (136 lb 11 oz)   12/17/21 0323 63.2 kg (139 lb 5.3 oz)   01/13/21 0000 59.9 kg (132 lb)   01/10/20 0000 51 kg (112 lb 6 oz)   12/17/19 0000 50.6 kg (111 lb 8 oz)   09/23/19 0000 50.4 kg (111 lb 2 oz)   07/02/19 0000 47.7 kg (105 lb 2 oz)   04/12/19 0000 47.3 kg (104 lb 6 oz)   01/29/19 0000 49.4 kg (109 lb)   12/14/18 0000 47.2 kg (104 lb)   10/23/18 0000 49.4 kg (109 lb)   10/19/18 0000 " 49.4 kg (109 lb)   09/17/18 0000 50.3 kg (111 lb)   07/06/18 0000 53.5 kg (118 lb)   06/07/18 0000 54.7 kg (120 lb 8 oz)   05/23/18 0000 58.3 kg (128 lb 8 oz)   03/14/18 0000 56.7 kg (125 lb)   03/05/18 0000 57.6 kg (127 lb)            Wt Change Observation 14.3% decrease x 6 months     Estimated/Assessed Needs       Energy Requirements 30-35 kcal/kg   EST Needs (kcal/day) 7087-0139 kcal       Protein Requirements 1.2-1.5 g/kg   EST Daily Needs (g/day) 59-74 g       Fluid Requirements 1 ml/kcal    Estimated Needs (mL/day) 2331-7579 ml     Labs/Medications         Pertinent Labs Reviewed.   Results from last 7 days   Lab Units 04/21/23  0602 04/20/23  0448 04/18/23  0140 04/17/23  1255 04/17/23  0200 04/16/23  1338   SODIUM mmol/L 139 140 140 135*   < > 136   POTASSIUM mmol/L 3.9 3.5 3.9 3.7   < > 3.5   CHLORIDE mmol/L 103 106 105 103   < > 97*   CO2 mmol/L 25.2 27.1 26.7 22.0   < > 24.8   BUN mg/dL 13 17 10 11   < > 11   CREATININE mg/dL 0.68 0.74 0.83 0.73   < > 0.96   CALCIUM mg/dL 9.5 8.9 8.9 9.0   < > 10.0   BILIRUBIN mg/dL  --   --   --  0.3  --  0.5   ALK PHOS U/L  --   --   --  52  --  72   ALT (SGPT) U/L  --   --   --  5  --  6   AST (SGOT) U/L  --   --   --  12  --  13   GLUCOSE mg/dL 92 113* 107* 93   < > 131*    < > = values in this interval not displayed.     Results from last 7 days   Lab Units 04/21/23  0602 04/17/23  1255 04/17/23  0200 04/16/23  1338   MAGNESIUM mg/dL  --   --  1.7 1.8   HEMOGLOBIN g/dL 14.5   < > 14.2 17.1*   HEMATOCRIT % 42.3   < > 40.9 50.0*    < > = values in this interval not displayed.     COVID19   Date Value Ref Range Status   05/01/2022 Not Detected Not Detected - Ref. Range Final     Lab Results   Component Value Date    HGBA1C 5.30 10/21/2022         Pertinent Medications Reviewed.     Current Nutrition Orders & Evaluation of Intake       Oral Nutrition     Current PO Diet NPO Diet NPO Type: Strict NPO   Supplement Orders Placed This Encounter      Dietary Nutrition  Supplements Boost Plus (Ensure Plus); vanilla      Place Feeding Tube Per [employer] System      Diet, Tube Feeding Tube Feeding Formula: RD to Initiate & Manage; Tube Feeding Type: RD to Manage       Malnutrition Severity Assessment      Patient meets criteria for : Severe Malnutrition     Malnutrition Severity Assessment      Patient meets criteria for : Severe Malnutrition          Nutrition Diagnosis         Nutrition Dx Problem 1 Severe malnutrition related to increased nutrient needs due to catabolic disease as evidenced by inadequate energy intake., decreased appetite., unintended wt change., body composition changes., patient report., family report., report of minimal PO intake. and COPD     Nutrition Intervention         Tube feeding: continuous  Nutren 1.0 Fiber  Initiate at 25 ml/hr  Increase by 20 ml every 4 hours until reach goal rate of 65 ml/hr  Water flushes: 45 ml every 4 hours    Goal rate will provide  1560 calories, 62 g protein, 1295 ml free water + water flushes of 270 ml= 1565 ml of fluid  Will meet estimated needs of pt.      Medical Nutrition Therapy/Nutrition Education          Learner     Readiness N/A  N/A     Method     Response N/A  N/A     Monitor/Evaluation        Monitor Per protocol, Pertinent labs, EN delivery/tolerance, Weight, GI status, Symptoms, POC/GOC, Swallow function     Nutrition Discharge Plan         To be determined     Electronically signed by:  Kera Tran RD  04/21/23 17:17 EDT

## 2023-04-21 NOTE — PLAN OF CARE
Goal Outcome Evaluation:               ASSESSMENT/ PLAN OF CARE:  Pt presents with limitations, noted below, that impede patient's ability to swallow safely maintain nutrition. The skills of a therapist will be required to safely and effectively implement the following treatment plan to restore maximal level of function.    PROBLEMS:  1.  Risk of aspiration, swallow delay, delayed initiation of swallow, decreased laryngeal elevation  TREATMENT: Speech therapy for dysphagia, improvement of swallow function for tolerance of trials of p.o. intake.      FREQUENCY/DURATION: Daily, 5 days a week    REHAB POTENTIAL:  Pt has good/fair rehab potential.  The following limitations may influence improvement/ length of tx medical status, mental status.    RECOMMENDATIONS:   1.   DIET: Cannot recommend safe p.o. intake.  Recommend alternative feeding method as primary nutrition as patient is at risk of aspiration, malnutrition and dehydration.  Trials of p.o. intake with speech pathologist only at this time.

## 2023-04-21 NOTE — PROGRESS NOTES
" [facility]   Hospitalist Progress Note  Date: 2023  Patient Name: Bessy Leggett  : 1950  MRN: [id number]  Date of admission: 2023      Subjective   Subjective     Summary: 73 y.o. female past medical history of depression/anxiety, COPD not on home oxygen, GERD, hypertension, IBS, chronic low back pain, dementia who presents to the ER due to 4-day history of worsening mental status, cough and shortness of breath.  Patient is a poor historian and unable to provide clear details about her history.  History supplemented by discussion with daughter at the bedside as well as discussion with ER staff.  Chart review shows patient was recently hospitalized here 2 weeks ago for GI bleed and COVID. EGD at that time showed normal esophagus with a small hiatal hernia and diffuse inflammation characterized by erythema and granularity in the antrum where biopsies were taken.  Patient at that time also had a UTI that was treated with antibiotics.  She was discharged home in stable condition however since being home over the last 4 days her daughter noticed her to be more lethargic with minimal activity.  In addition she has had intermittent spells of \"shaking\" that do not seem to be seizure type activity according to the daughter.  Patient has also been complaining of a pleuritic chest pain in the left chest around her breast as well as some dyspnea.  This has been associated with a productive cough over the last 2 days.  Her daughter has also noted oxygen saturations in the 80s at home for which she has been using another family member's oxygen to treat her.  Ultimately her dyspnea progressed significantly today and the daughter decided to bring her into the ER for evaluation     Upon arrival here patient was tachycardic to 114 and tachypneic to 35.  She was hypoxic on room air.  Lab work revealed a mildly elevated proBNP of 1199, leukocytosis of 12,000, elevated hemoglobin of 17.  CTA was personally " reviewed and did not reveal evidence of a pulmonary embolism but did reveal left pleural effusion as well as consolidation of the left lower lobe as well as marked emphysematous changes.EKG personally reviewed shows sinus tachycardia, borderline prolonged [place] interval and prolonged QTc with poor progression anteriorly, PACs and biatrial enlargement.  Blood cultures were drawn and patient started on Rocephin and doxycycline for suspected pneumonia.  Hospitalist service was then contacted for admission.    She had an episode of chest pain on 4/17/23 and had an RRT called.  Repeat lab and EKG was obtained and not significantly changed.  Chest pain improved with a dose of nitroglycerin.  Cardiology was consulted.   I reviewed her CT of the chest which was concerning for possible need for bronchoscopy.  Chest pain was felt to be pulmonary in origin.  Pulmonology was consulted. Underwent bronchoscopy on 4/17/2023.  She had significant mucus plugging with thick secretions which were suctioned out and cultures were sent.     Interval Followup: No acute events overnight.  Patient much improved today.  She is still sleeping a lot during the day.  Patient currently resting comfortably on 2 L supplemental oxygen per nasal cannula.  Reportedly had low oxygen saturation earlier but pulmonary reports she did not have a good pleth at that time.      Objective   Objective     Vitals:   Temp:  [97.3 °F (36.3 °C)-98.2 °F (36.8 °C)] 97.3 °F (36.3 °C)  Heart Rate:  [66-83] 83  Resp:  [18-20] 18  BP: (130-149)/(66-90) 149/87  Flow (L/min):  [2] 2  Physical Exam    Constitutional: Awake, somnolent, no acute distress   Respiratory: Diminished breath sounds to auscultation bilaterally, nonlabored respirations    Cardiovascular: RRR   Gastrointestinal: Positive bowel sounds, soft, nontender, nondistended   Neurologic: Oriented x 3, strength symmetric in all extremities, Cranial Nerves grossly intact to confrontation, speech clear   Skin: No  hilario       Assessment & Plan   Assessment / Plan     Assessment/Plan:  • Acute hypoxic respiratory failure secondary to HCAP and COPD exacerbation  • Sepsis 2/2 Healthcare associated pneumonia  • Dense mucous plugging left lower lobe bronchus with thick viscous copious purulent secretions.   • Acute metabolic encephalopathy secondary to above on chronic dementia with amyloid angiopathy  • Lactic acidosis likely secondary to sepsis above  • Prolonged QTc.  Resolved  • Elevated troponin likely type II MI secondary to demand ischemia from above.  Stable  • Polycythemia, likely secondary to chronic hypoxia  • Suspected dementia amyloid angiopathy  • Infrarenal abdominal aortic aneurysm, 5.6 cm status post prior graft repair  • COPD     Plan  · Remain admitted to telemetry on the hospitalist service  · We need to wean her to room air if tolerated  · I encouraged her to use the airway clearance devices Acapella and incentive spirometer.  I fished them out of the bucket near her bedside table and put them on her tray so she could have access to them again  · Appreciate neurology input.  Psychiatry consulted as well for tomorrow  · We will continue antibiotics   · Sputum cultures and bronchopulmonary hygiene ordered.  All culture data so far negative  · Continue scheduled nebulizers  · Continue systemic corticosteroids  · Cardiology following.  Appreciate input  · Consult PT/OT  · Avoid QTc prolonging medications, repeat high-sensitivity troponin on schedule  · Patient has outpatient follow-up with neurology later this month for suspected dementia, advised to keep appointment  · Advised vascular surgery follow-up for known infrarenal AAA  · All labs, imaging and EKG personally reviewed, findings as described above      Discussed plan with RN.    DVT prophylaxis:  Medical DVT prophylaxis orders are present.    CODE STATUS:   Level Of Support Discussed With: Health Care Surrogate; Next of Kin (If No Surrogate)  Code Status  (Patient has no pulse and is not breathing): CPR (Attempt to Resuscitate)  Medical Interventions (Patient has pulse or is breathing): Full Support

## 2023-04-21 NOTE — THERAPY EVALUATION
Acute Care - Speech Language Pathology   Swallow Initial Evaluation  [facility]     Patient Name: Bessy Leggett  : 1950  MRN: [id number]  Today's Date: 2023               Admit Date: 2023    Visit Dx:     ICD-10-CM ICD-9-CM   1. COPD exacerbation  J44.1 491.21   2. Acute respiratory failure with hypoxia  J96.01 518.81   3. Pneumonia due to infectious organism, unspecified laterality, unspecified part of lung  J18.9 486   4. Mucus plugging of bronchi  T17.500A 519.19   5. Difficulty walking  R26.2 719.7   6. Decreased activities of daily living (ADL)  Z78.9 V49.89   7. Oropharyngeal dysphagia  R13.12 787.22     Patient Active Problem List   Diagnosis   • AAA (abdominal aortic aneurysm) without rupture   • Hypertension   • Anxiety   • Asthma   • Cataract   • COPD (chronic obstructive pulmonary disease)   • Depression   • Non-insulin dependent type 2 diabetes mellitus   • Family history of blood clots   • GERD (gastroesophageal reflux disease)   • History of AAA (abdominal aortic aneurysm) repair   • History of seizure   • Hypercholesterolemia   • IBS (irritable bowel syndrome)   • Low back pain   • Melena   • Epigastric pain   • Intestinal metaplasia of stomach   • Acute respiratory failure with hypoxia   • Severe Malnutrition (HCC)   • Pneumonia due to infectious organism   • Mucus plugging of bronchi   • Vascular dementia with behavior disturbance     Past Medical History:   Diagnosis Date   • Anxiety 2018    PATIENT REPORTS A LONG HISTORY OF ANXIETY ALONG IWTH AGOURA PHOBIA. SHE HAS BEEN PREVIOUSLY BEEN SEEN BY PSYCH RECENTLY., HER XANAX WAS STOPPED. I WILL REFER THE PATIENT FOR EVALUATION BY MENTAL HEALTH SPECIALIST   • Asthma    • Cataract    • COPD (chronic obstructive pulmonary disease)    • Depression    • GERD (gastroesophageal reflux disease)    • Hypertension    • IBS (irritable bowel syndrome)    • Low back pain      Past Surgical History:   Procedure Laterality Date   • ABDOMINAL  SURGERY     • BRONCHOSCOPY N/A 4/18/2023    Procedure: BRONCHOSCOPY WITH BRONCHIOALVEOLAR LAVAGE/WASHINGS;  Surgeon: Cabrera Obando MD;  Location: [facility] ENDOSCOPY;  Service: Pulmonary;  Laterality: N/A;  MUCOUS PLUGGING   • COLONOSCOPY     • ENDOSCOPY N/A 4/5/2023    Procedure: ESOPHAGOGASTRODUODENOSCOPY WITH BIOPSIES;  Surgeon: Shaina Pastrnaa MD;  Location: [facility] ENDOSCOPY;  Service: Gastroenterology;  Laterality: N/A;  GASTRITIS  HIATAL HERNIA   • GALLBLADDER SURGERY     • HYSTERECTOMY     • VASCULAR SURGERY           Inpatient Speech Pathology Dysphagia Evaluation        PAIN SCALE: None indicated    PRECAUTIONS/CONTRAINDICATIONS: Standard, fall    SUSPECTED ABUSE/NEGLECT/EXPLOITATION: None identified    SOCIAL/PSYCHOLOGICAL NEEDS/BARRIERS: Dementia    PAST SOCIAL HISTORY: 73-year-old female, lives at home    PRIOR FUNCTION: Daughter reports patient on a regular diet at home however noticing some swallowing difficulty.    PATIENT GOALS/EXPECTATIONS: Did not state or indicate goals or expectations    HISTORY: Patient is a 73-year-old female referred for speech pathology dysphagia evaluation due to concern of aspiration.  Patient admitted on 4/16/2023 secondary to acute respiratory failure with hypoxia.  She underwent bronchoscopy on 4/18/2023.  Nursing and daughter at bedside report of poor p.o. intake, agitation, paranoia, refusing medications.  Nursing also concerned for new onset swallowing difficulty.    CURRENT DIET LEVEL: Regular    OBJECTIVE:    TEST ADMINISTERED: Clinical dysphagia evaluation    COGNITION/SAFETY AWARENESS: Not thoroughly evaluated    BEHAVIORAL OBSERVATIONS: At beginning of evaluation, patient awake, completed oral motor examination however as evaluation proceeded patient closing eyes, holding arms and air with fists closed.    ORAL MOTOR EXAM: Sticky oral mucosa    VOICE QUALITY: Clear however weak    REFLEX EXAM: Nonproductive    POSTURE: Total  assistance    FEEDING/SWALLOWING FUNCTION: Assessed with thin liquids, nectar thick liquids, purée solids.  Not able to assess tolerance of soft and regular solids.    CLINICAL OBSERVATIONS: Nectar thick liquid by spoon.  Patient holding in oral cavity for greater than 1 minute.  Coughing observed followed by swallow completion.  Continued with coughing.  Thin liquids by spoon with trials x2 with patient not eliciting swallow.  Delayed wet coughing.  Purée solids with patient biting to spoon.  Holding consistency in oral cavity for 2 to 3 minutes.  Eventual swallow completion.  Decreased laryngeal elevation to palpation at 3/5.  Multiple swallows observed.    DYSPHAGIA CRITERIA: High risk of aspiration    FUNCTIONAL ASSESSMENT INSTRUMENT: Patient currently scored a level 1 of 7 on Functional Communication Measures for swallowing indicating a 0% limitation in function.    ASSESSMENT/ PLAN OF CARE:  Pt presents with limitations, noted below, that impede patient's ability to swallow safely maintain nutrition. The skills of a therapist will be required to safely and effectively implement the following treatment plan to restore maximal level of function.    PROBLEMS:  1.  Risk of aspiration, swallow delay, delayed initiation of swallow, decreased laryngeal elevation   LTG 1: 30 days.  Patient will increase functional communication measures for swallowing to level 3 of 7, indicating a 60 to 79% limitation in function.   STG 1a: 14 days.  Patient will improve swallow function for tolerance of trials of nectar thick liquids with minimal to no signs or symptoms of aspiration.   STG 1b: 14 days.  Patient will improve swallow function for tolerance of trials of purée solids with minimal to no signs or symptoms of aspiration.   STG 1c: 14 days.  Patient will improve swallow function for tolerance of ice chip trials with minimal to no signs or symptoms of aspiration.   TREATMENT: Speech therapy for dysphagia, improvement of  swallow function for tolerance of trials of p.o. intake.      FREQUENCY/DURATION: Daily, 5 days a week    REHAB POTENTIAL:  Pt has good/fair rehab potential.  The following limitations may influence improvement/ length of tx medical status, mental status.    RECOMMENDATIONS:   1.   DIET: Cannot recommend safe p.o. intake.  Recommend alternative feeding method as primary nutrition as patient is at risk of aspiration, malnutrition and dehydration.  Trials of p.o. intake with speech pathologist only at this time.      Pt/responsible party agrees with plan of care and has been informed of all alternatives, risks and benefits.  SLP Recommendation and Plan                          Anticipated Discharge Disposition (SLP): anticipate therapy at next level of care (04/21/23 1012)                                                            EDUCATION  The patient has been educated in the following areas:   Dysphagia (Swallowing Impairment).              Time Calculation:    Time Calculation- SLP     Row Name 04/21/23 1012             Time Calculation- SLP    SLP Start Time 0630  -SN      SLP Stop Time 0730  -SN      SLP Time Calculation (min) 60 min  -SN      SLP Received On 04/21/23  -SN         Untimed Charges    [id number]-JN Eval Oral Pharyng Swallow Minutes 60  -SN         Total Minutes    Untimed Charges Total Minutes 60  -SN       Total Minutes 60  -SN            User Key  (r) = Recorded By, (t) = Taken By, (c) = Cosigned By    Initials Name Provider Type    Haylie Herrera MS-CCC/SLP, PRIMO Speech and Language Pathologist                Therapy Charges for Today     Code Description Service Date Service Provider Modifiers Qty    [id number] HC ST EVAL ORAL PHARYNG SWALLOW 4 4/21/2023 Haylie Meeks MS-CCC/SLP, CNT GN 1               TARIK Orourke/PRIMO VEGA  4/21/2023

## 2023-04-21 NOTE — PROGRESS NOTES
" [facility]   Hospitalist Progress Note  Date: 2023  Patient Name: Bessy Leggett  : 1950  MRN: [id number]  Date of admission: 2023      Subjective   Subjective     Summary: 73 y.o. female past medical history of depression/anxiety, COPD not on home oxygen, GERD, hypertension, IBS, chronic low back pain, dementia who presents to the ER due to 4-day history of worsening mental status, cough and shortness of breath.  Patient is a poor historian and unable to provide clear details about her history.  History supplemented by discussion with daughter at the bedside as well as discussion with ER staff.  Chart review shows patient was recently hospitalized here 2 weeks ago for GI bleed and COVID. EGD at that time showed normal esophagus with a small hiatal hernia and diffuse inflammation characterized by erythema and granularity in the antrum where biopsies were taken.  Patient at that time also had a UTI that was treated with antibiotics.  She was discharged home in stable condition however since being home over the last 4 days her daughter noticed her to be more lethargic with minimal activity.  In addition she has had intermittent spells of \"shaking\" that do not seem to be seizure type activity according to the daughter.  Patient has also been complaining of a pleuritic chest pain in the left chest around her breast as well as some dyspnea.  This has been associated with a productive cough over the last 2 days.  Her daughter has also noted oxygen saturations in the 80s at home for which she has been using another family member's oxygen to treat her.  Ultimately her dyspnea progressed significantly today and the daughter decided to bring her into the ER for evaluation     Upon arrival here patient was tachycardic to 114 and tachypneic to 35.  She was hypoxic on room air.  Lab work revealed a mildly elevated proBNP of 1199, leukocytosis of 12,000, elevated hemoglobin of 17.  CTA was personally " reviewed and did not reveal evidence of a pulmonary embolism but did reveal left pleural effusion as well as consolidation of the left lower lobe as well as marked emphysematous changes.EKG personally reviewed shows sinus tachycardia, borderline prolonged [place] interval and prolonged QTc with poor progression anteriorly, PACs and biatrial enlargement.  Blood cultures were drawn and patient started on Rocephin and doxycycline for suspected pneumonia.  Hospitalist service was then contacted for admission.    She had an episode of chest pain on 4/17/23 and had an RRT called.  Repeat lab and EKG was obtained and not significantly changed.  Chest pain improved with a dose of nitroglycerin.  Cardiology was consulted.   I reviewed her CT of the chest which was concerning for possible need for bronchoscopy.  Chest pain was felt to be pulmonary in origin.  Pulmonology was consulted. Underwent bronchoscopy on 4/17/2023.  She had significant mucus plugging with thick secretions which were suctioned out and cultures were sent.     Interval Followup: No acute events overnight.  Patient much improved today.  She is still sleeping a lot during the day.  Patient currently resting comfortably on room air.  She failed her swallow eval today and ST is recommending NPO and Coretrack.        Objective   Objective     Vitals:   Temp:  [97.2 °F (36.2 °C)-98.6 °F (37 °C)] 98.6 °F (37 °C)  Heart Rate:  [] 84  Resp:  [18-24] 18  BP: (146-174)/() 174/101  Flow (L/min):  [2] 2  Physical Exam    Constitutional: Awake, somnolent, no acute distress, very weak   Respiratory: Diminished breath sounds to auscultation bilaterally, nonlabored respirations    Cardiovascular: RRR   Gastrointestinal: Positive bowel sounds, soft, nontender, nondistended   Neurologic: Oriented x 3, speech faint and weak   Skin: No rashes       Assessment & Plan   Assessment / Plan     Assessment/Plan:  • Acute hypoxic respiratory failure secondary to HCAP and COPD  exacerbation  • Sepsis 2/2 Healthcare associated pneumonia  • Dense mucous plugging left lower lobe bronchus with thick viscous copious purulent secretions.   • Acute metabolic encephalopathy secondary to above on chronic dementia with amyloid angiopathy  • Dysphagia with aspiration  • Lactic acidosis likely secondary to sepsis above  • Prolonged QTc.  Resolved  • Elevated troponin likely type II MI secondary to demand ischemia from above.  Stable  • Polycythemia, likely secondary to chronic hypoxia  • Suspected dementia amyloid angiopathy  • Infrarenal abdominal aortic aneurysm, 5.6 cm status post prior graft repair  • COPD  • Infrarenal AAA.  Will need outpatient follow-up     Plan  · Remain admitted to telemetry on the hospitalist service  · NPO  · Coretrack for nutrition  · I encouraged her to use the airway clearance devices Acapella and incentive spirometer.    · Had a in-depth discussion with the patient's daughters.  We agreed to place a core track for nutrition and see if her mentation improves enough to have her swallowing improve enough to where she can tolerate some kind of diet and not be NPO.  We discussed the fact that a surgically placed feeding tube would not be a good option and the patient's family agreed.  Everybody was in agreement that the core track will be a temporary measure to see if she has enough improvement to eat.  If she is not able to tolerate an oral diet the next consideration would be palliative care.  · Appreciate neurology input.   · We will continue antibiotics with Omnicef  · Sputum cultures and bronchopulmonary hygiene ordered.  All culture data so far negative  · Continue scheduled nebulizers  · Cardiology following.  Appreciate input  · PT/OT  · Advised vascular surgery follow-up for known infrarenal AAA  · All labs, imaging and EKG personally reviewed, findings as described above      Discussed plan with RN.    DVT prophylaxis:  Medical DVT prophylaxis orders are  present.    CODE STATUS:   Level Of Support Discussed With: Health Care Surrogate; Next of Kin (If No Surrogate)  Code Status (Patient has no pulse and is not breathing): CPR (Attempt to Resuscitate)  Medical Interventions (Patient has pulse or is breathing): Full Support

## 2023-04-21 NOTE — PROGRESS NOTES
Pulmonary / Critical Care Progress Note      Patient Name: Bessy Leggett  : 1950  MRN: [id number]  Attending:  Dick Mitchell DO  Date of admission: 2023    Subjective   Subjective   Follow-up for pneumonia      underwent bronchoscopy for dense mucous plugging, incidental finding of small left vocal cord polyp, BAL cultures NGTD, cytology negative.    Over the last 24 hours, prednisone discontinued, weaned off supplemental oxygen, remains confused, neurology consult completed revealing vascular dementia, psych consult ordered    No acute events overnight.    This morning,  Lying in bed on room air with SPO2 94%  Family at bedside  Breathing improved  Weak cough  Remains confused  Remains weak and fatigued  SLP to complete swallow eval today  No fever/chills    Review of Systems  General: Confused, fatigue and weakness, otherwise denied complaints  Cardiovascular:  Denied complaints  Respiratory: dyspnea, cough, otherwise denied complaints  Gastrointestinal: Denied complaints    Objective   Objective     Vitals:   Temp:  [97.2 °F (36.2 °C)-98.2 °F (36.8 °C)] 97.2 °F (36.2 °C)  Heart Rate:  [] 105  Resp:  [18-24] 18  BP: (141-166)/(86-94) 155/86  Flow (L/min):  [2] 2    Physical Exam   Vital Signs Reviewed   General: Frail elderly female, NAD.    HEENT:  PERRL, EOMI.  OP, nares clear  Chest:  good aeration,  faint bilateral wheezing, tympanic to percussion bilaterally, no work of breathing noted on room air  CV: RRR, no MGR, pulses 2+, equal.  Abd:  Soft, NT, ND, + BS, no HSM  EXT:  no clubbing, no cyanosis, no edema  Neuro:  A&Ox3, CN grossly intact, no focal deficits.  Skin: No rashes or lesions noted       Result Review    Result Review:  I have personally reviewed the results from the time of this admission to 2023 14:37 EDT and agree with these findings:  [x]  Laboratory  [x]  Microbiology  [x]  Radiology  [x]  EKG/Telemetry   []  Cardiology/Vascular   []  Pathology  []  Old  records  []  Other:  Most notable findings include:     4/18 BAL cultures -NGTD, cytology negative  Pneumonia panel negative  S pneumo/Legionella negative  MRSA PCR negative        Lab 04/21/23  0602 04/20/23  0448 04/18/23  0140 04/17/23  1255 04/17/23  0200 04/16/23  1338   WBC 7.89 6.89 8.35  --  6.09 12.24*   HEMOGLOBIN 14.5 12.5 13.3 13.2 14.2 17.1*   HEMATOCRIT 42.3 37.0 39.0 38.1 40.9 50.0*   PLATELETS 213 177 231  --  247 292   SODIUM 139 140 140 135* 135* 136   POTASSIUM 3.9 3.5 3.9 3.7 3.7 3.5   CHLORIDE 103 106 105 103 101 97*   CO2 25.2 27.1 26.7 22.0 24.8 24.8   BUN 13 17 10 11 13 11   CREATININE 0.68 0.74 0.83 0.73 0.80 0.96   GLUCOSE 92 113* 107* 93 181* 131*   CALCIUM 9.5 8.9 8.9 9.0 9.1 10.0   TOTAL PROTEIN  --   --   --  5.4*  --  7.2   ALBUMIN  --   --   --  3.4*  --  4.0   GLOBULIN  --   --   --  2.0  --  3.2       CT Chest With Contrast Diagnostic    Result Date: 4/16/2023  PROCEDURE: CT CHEST W CONTRAST DIAGNOSTIC  COMPARISON:  [facility], CR, XR CHEST 1 VW, 12/17/2021, 3:38.  [facility], CR, XR CHEST 1 VW, 5/01/2022, 13:07.  [facility], CR, XR CHEST 1 VW, 4/16/2023, 13:29.  [facility], CT, CHEST W/O CONTRAST, 11/09/2011, 14:11.  [facility], CT, CT ABDOMEN PELVIS W CONTRAST, 4/04/2023, 18:53. INDICATIONS: Shortness of breath and tachycardia  TECHNIQUE: After obtaining the patient's consent, CT images were obtained with non-ionic intravenous contrast material.   PROTOCOL:   Pulmonary embolism imaging protocol performed    RADIATION:   DLP: 244.4mGy*cm   Automated exposure control was utilized to minimize radiation dose. CONTRAST: 68cc Isovue 370 I.V. LABS:   eGFR: >60ml/min/1.73m2  FINDINGS:  No pulmonary embolism is identified.  A small left pleural effusion is noted.  A small pericardial effusion is evident.  No adenopathy is identified.  Consolidation is noted in the left lower lobe.  There is a prominent bulla noted  in the medial aspect of the left lower lobe.  Marked emphysematous changes are noted.  There is an incompletely imaged fusiform abdominal aortic aneurysm measuring at least 5.1 cm AP.  A bifurcated stent graft has been placed.  A cholecystectomy has been performed.  In the right hepatic lobe on image 267 is a low-density mass consistent with a cyst.  In the left hepatic lobe at the same level is a 0.8 cm hypodense focus, suspected to represent a cyst but too small for definitive characterization.  There is moderate to severe atrophy of the left kidney.  There are suspected bilateral renal cysts.  No focal osseous lesion is seen.      Impression:   1. No evidence of pulmonary embolism 2. Small left pleural and pericardial effusions 3. Consolidation in the left lower lobe may represent atelectasis or pneumonia. 4. Abdominal aortic aneurysm, incompletely imaged on this examination 5. Previous cholecystectomy 6. Moderate to severe atrophy of the left kidney 7. Marked emphysematous changes     Jermaine Biggs M.D.       Electronically Signed and Approved By: Jermaine Biggs M.D. on 4/16/2023 at 17:43             Assessment & Plan   Assessment / Plan     Active Hospital Problems:  Active Hospital Problems    Diagnosis    • **Acute respiratory failure with hypoxia    • Vascular dementia with behavior disturbance    • Severe Malnutrition (HCC)    • Pneumonia due to infectious organism    • Mucus plugging of bronchi    • COPD (chronic obstructive pulmonary disease)      Impression:  Acute hypoxic respiratory failure  Healthcare associat nosocomial pneumonia versus postobstructive pneumonia left lower lobe from unspecified organism  Mucous plugging versus endobronchial lesion left lower lobe  Question aspiration and airways  Lactic acidosis  Sepsis  Acute COPD exacerbation  Chronic dementia  Ongoing tobacco use of cigarettes  Left vocal cord polyp  Acute hyperactive delirium hypokalemia    Plan:  -Currently on room air.     -Continue cefdinir 300 mg twice daily for 7-day total treatment  -BAL cultures NGTD, cytology negative  -Continue Brovana, Pulmicort, Yupelri and albuterol nebulizers  -Continue bronchopulmonary hygiene  -Continue chest physiotherapy  -Encourage I-S/flutter valve  -Continue delirium precautions and Haldol as needed  -Neurology on board, appreciate assistance  -Psychiatry on board, appreciate assistance  -PT/OT on board  -Encourage mobilization, out of bed to chair  -SLP on board, swallow eval failed today, cannot recommend safe p.o. intake  -Small left vocal cord polyp incidentally noted on bronchoscopy, recommend outpatient ENT follow-up upon discharge       DVT prophylaxis:  Medical DVT prophylaxis orders are present.    CODE STATUS:   Level Of Support Discussed With: Health Care Surrogate; Next of Kin (If No Surrogate)  Code Status (Patient has no pulse and is not breathing): CPR (Attempt to Resuscitate)  Medical Interventions (Patient has pulse or is breathing): Full Support      Labs, imaging, microbiology, notes and medications personally reviewed  Discussed with primary    Electronically signed by ROSANGELA Camarillo, 04/21/23, 11:50 AM EDT.    I, Dr. Cabrera Obando, have spent more than 50% of the total time managing the patient in this encounter today.  This included personally reviewing all pertinent labs, imaging, microbiology and documentation. Also discussing the case with the patient and any available family, the admitting physician and any available ancillary staff.    Electronically signed by Cabrera Obando MD, 04/21/23, 3:12 PM EDT.

## 2023-04-21 NOTE — PROGRESS NOTES
Date of service: 4/21/2023    Subjective: Family is here.  More awake.  No chest pain, SOB, or lightheadedness.    Review of systems: No nausea, vomiting or abdominal pain.    Physical examination: She is no pain or distress.  Vital signs: Reviewed  Neck: No JVD or carotid bruit  Chest: CTA.    No chest wall tenderness.    Cardiac: RRR.  No gallop or murmurs  Abdomen: Soft, tender all over.  No megalies or masses.  Extremities: No edema, cyanosis or clubbing.  Pulse intact.  Neuro: Alert, oriented to her name, no facial droop and speech was clear.     Records: Reviewed.     Assessment and plan:    Assessment and plan:     1.  Pleuritic type chest pain and/or musculoskeletal pain.  Tylenol as needed.   2.  Elevated HS troponin T, mostly due to demand ischemia.  CK-MB was  normal.  Acute MI was ruled out.  3.  Acute hypoxemic respiratory failure, improving  4.  Sepsis and pneumonia, seems improving  5.  Status post repair of AAA.  6.  COPD  7.  Cardiac: Stable.  Nothing to add.  We will sign off follow-up will be prn.

## 2023-04-22 LAB
ANION GAP SERPL CALCULATED.3IONS-SCNC: 9.4 MMOL/L (ref 5–15)
BASOPHILS # BLD AUTO: 0.04 10*3/MM3 (ref 0–0.2)
BASOPHILS NFR BLD AUTO: 0.3 % (ref 0–1.5)
BUN SERPL-MCNC: 15 MG/DL (ref 8–23)
BUN/CREAT SERPL: 19.2 (ref 7–25)
CALCIUM SPEC-SCNC: 9.9 MG/DL (ref 8.6–10.5)
CHLORIDE SERPL-SCNC: 99 MMOL/L (ref 98–107)
CO2 SERPL-SCNC: 25.6 MMOL/L (ref 22–29)
CREAT SERPL-MCNC: 0.78 MG/DL (ref 0.57–1)
DEPRECATED RDW RBC AUTO: 44.1 FL (ref 37–54)
EGFRCR SERPLBLD CKD-EPI 2021: 80.8 ML/MIN/1.73
EOSINOPHIL # BLD AUTO: 0.07 10*3/MM3 (ref 0–0.4)
EOSINOPHIL NFR BLD AUTO: 0.5 % (ref 0.3–6.2)
ERYTHROCYTE [DISTWIDTH] IN BLOOD BY AUTOMATED COUNT: 13 % (ref 12.3–15.4)
GLUCOSE BLDC GLUCOMTR-MCNC: 147 MG/DL (ref 70–99)
GLUCOSE BLDC GLUCOMTR-MCNC: 150 MG/DL (ref 70–99)
GLUCOSE BLDC GLUCOMTR-MCNC: 176 MG/DL (ref 70–99)
GLUCOSE BLDC GLUCOMTR-MCNC: 188 MG/DL (ref 70–99)
GLUCOSE BLDC GLUCOMTR-MCNC: 220 MG/DL (ref 70–99)
GLUCOSE SERPL-MCNC: 160 MG/DL (ref 65–99)
HCT VFR BLD AUTO: 48.7 % (ref 34–46.6)
HGB BLD-MCNC: 16.4 G/DL (ref 12–15.9)
IMM GRANULOCYTES # BLD AUTO: 0.06 10*3/MM3 (ref 0–0.05)
IMM GRANULOCYTES NFR BLD AUTO: 0.5 % (ref 0–0.5)
LYMPHOCYTES # BLD AUTO: 1.7 10*3/MM3 (ref 0.7–3.1)
LYMPHOCYTES NFR BLD AUTO: 13.3 % (ref 19.6–45.3)
MAGNESIUM SERPL-MCNC: 2.2 MG/DL (ref 1.6–2.4)
MCH RBC QN AUTO: 31 PG (ref 26.6–33)
MCHC RBC AUTO-ENTMCNC: 33.7 G/DL (ref 31.5–35.7)
MCV RBC AUTO: 92.1 FL (ref 79–97)
MONOCYTES # BLD AUTO: 1.07 10*3/MM3 (ref 0.1–0.9)
MONOCYTES NFR BLD AUTO: 8.4 % (ref 5–12)
NEUTROPHILS NFR BLD AUTO: 77 % (ref 42.7–76)
NEUTROPHILS NFR BLD AUTO: 9.8 10*3/MM3 (ref 1.7–7)
NRBC BLD AUTO-RTO: 0 /100 WBC (ref 0–0.2)
PHOSPHATE SERPL-MCNC: 2.8 MG/DL (ref 2.5–4.5)
PLATELET # BLD AUTO: 252 10*3/MM3 (ref 140–450)
PMV BLD AUTO: 9.7 FL (ref 6–12)
POTASSIUM SERPL-SCNC: 4.5 MMOL/L (ref 3.5–5.2)
RBC # BLD AUTO: 5.29 10*6/MM3 (ref 3.77–5.28)
SODIUM SERPL-SCNC: 134 MMOL/L (ref 136–145)
WBC NRBC COR # BLD: 12.74 10*3/MM3 (ref 3.4–10.8)

## 2023-04-22 PROCEDURE — 94760 N-INVAS EAR/PLS OXIMETRY 1: CPT

## 2023-04-22 PROCEDURE — 94799 UNLISTED PULMONARY SVC/PX: CPT

## 2023-04-22 PROCEDURE — 80048 BASIC METABOLIC PNL TOTAL CA: CPT | Performed by: FAMILY MEDICINE

## 2023-04-22 PROCEDURE — 85025 COMPLETE CBC W/AUTO DIFF WBC: CPT | Performed by: FAMILY MEDICINE

## 2023-04-22 PROCEDURE — 99232 SBSQ HOSP IP/OBS MODERATE 35: CPT | Performed by: INTERNAL MEDICINE

## 2023-04-22 PROCEDURE — 84100 ASSAY OF PHOSPHORUS: CPT | Performed by: FAMILY MEDICINE

## 2023-04-22 PROCEDURE — 83735 ASSAY OF MAGNESIUM: CPT | Performed by: FAMILY MEDICINE

## 2023-04-22 PROCEDURE — 82962 GLUCOSE BLOOD TEST: CPT

## 2023-04-22 PROCEDURE — 25010000002 ENOXAPARIN PER 10 MG: Performed by: INTERNAL MEDICINE

## 2023-04-22 PROCEDURE — 94761 N-INVAS EAR/PLS OXIMETRY MLT: CPT

## 2023-04-22 PROCEDURE — 99233 SBSQ HOSP IP/OBS HIGH 50: CPT | Performed by: INTERNAL MEDICINE

## 2023-04-22 PROCEDURE — 25010000002 LEVETIRACETAM IN NACL 0.82% 500 MG/100ML SOLUTION: Performed by: INTERNAL MEDICINE

## 2023-04-22 PROCEDURE — 63710000001 REVEFENACIN 175 MCG/3ML SOLUTION: Performed by: INTERNAL MEDICINE

## 2023-04-22 PROCEDURE — 94668 MNPJ CHEST WALL SBSQ: CPT

## 2023-04-22 RX ORDER — LISINOPRIL 20 MG/1
20 TABLET ORAL DAILY
Status: DISCONTINUED | OUTPATIENT
Start: 2023-04-23 | End: 2023-04-26 | Stop reason: HOSPADM

## 2023-04-22 RX ORDER — PRAVASTATIN SODIUM 40 MG
40 TABLET ORAL NIGHTLY
Status: DISCONTINUED | OUTPATIENT
Start: 2023-04-22 | End: 2023-04-26 | Stop reason: HOSPADM

## 2023-04-22 RX ORDER — BISACODYL 10 MG
10 SUPPOSITORY, RECTAL RECTAL DAILY PRN
Status: DISCONTINUED | OUTPATIENT
Start: 2023-04-22 | End: 2023-04-26 | Stop reason: HOSPADM

## 2023-04-22 RX ORDER — AMOXICILLIN 250 MG
2 CAPSULE ORAL 2 TIMES DAILY
Status: DISCONTINUED | OUTPATIENT
Start: 2023-04-22 | End: 2023-04-26 | Stop reason: HOSPADM

## 2023-04-22 RX ORDER — POLYETHYLENE GLYCOL 3350 17 G/17G
17 POWDER, FOR SOLUTION ORAL DAILY PRN
Status: DISCONTINUED | OUTPATIENT
Start: 2023-04-22 | End: 2023-04-26 | Stop reason: HOSPADM

## 2023-04-22 RX ORDER — LISINOPRIL 20 MG/1
20 TABLET ORAL ONCE
Status: COMPLETED | OUTPATIENT
Start: 2023-04-22 | End: 2023-04-22

## 2023-04-22 RX ORDER — CHOLECALCIFEROL (VITAMIN D3) 125 MCG
5 CAPSULE ORAL NIGHTLY PRN
Status: DISCONTINUED | OUTPATIENT
Start: 2023-04-22 | End: 2023-04-26 | Stop reason: HOSPADM

## 2023-04-22 RX ORDER — ACETAMINOPHEN 325 MG/1
650 TABLET ORAL EVERY 6 HOURS PRN
Status: DISCONTINUED | OUTPATIENT
Start: 2023-04-22 | End: 2023-04-24

## 2023-04-22 RX ORDER — LEVETIRACETAM 5 MG/ML
500 INJECTION INTRAVASCULAR EVERY 12 HOURS SCHEDULED
Status: DISCONTINUED | OUTPATIENT
Start: 2023-04-22 | End: 2023-04-23

## 2023-04-22 RX ORDER — CEFDINIR 300 MG/1
300 CAPSULE ORAL EVERY 12 HOURS SCHEDULED
Status: COMPLETED | OUTPATIENT
Start: 2023-04-22 | End: 2023-04-24

## 2023-04-22 RX ORDER — PANTOPRAZOLE SODIUM 40 MG/10ML
40 INJECTION, POWDER, LYOPHILIZED, FOR SOLUTION INTRAVENOUS
Status: DISCONTINUED | OUTPATIENT
Start: 2023-04-22 | End: 2023-04-26 | Stop reason: HOSPADM

## 2023-04-22 RX ORDER — BISACODYL 5 MG/1
5 TABLET, DELAYED RELEASE ORAL DAILY PRN
Status: DISCONTINUED | OUTPATIENT
Start: 2023-04-22 | End: 2023-04-22

## 2023-04-22 RX ORDER — ALUMINA, MAGNESIA, AND SIMETHICONE 2400; 2400; 240 MG/30ML; MG/30ML; MG/30ML
15 SUSPENSION ORAL EVERY 6 HOURS PRN
Status: DISCONTINUED | OUTPATIENT
Start: 2023-04-22 | End: 2023-04-26 | Stop reason: HOSPADM

## 2023-04-22 RX ORDER — CITALOPRAM 20 MG/1
20 TABLET ORAL EVERY MORNING
Status: DISCONTINUED | OUTPATIENT
Start: 2023-04-23 | End: 2023-04-22

## 2023-04-22 RX ADMIN — PANTOPRAZOLE SODIUM 40 MG: 40 TABLET, DELAYED RELEASE ORAL at 05:58

## 2023-04-22 RX ADMIN — BUDESONIDE 0.5 MG: 0.5 SUSPENSION RESPIRATORY (INHALATION) at 18:36

## 2023-04-22 RX ADMIN — ARFORMOTEROL TARTRATE 15 MCG: 15 SOLUTION RESPIRATORY (INHALATION) at 18:36

## 2023-04-22 RX ADMIN — SENNOSIDES AND DOCUSATE SODIUM 2 TABLET: 8.6; 5 TABLET ORAL at 21:56

## 2023-04-22 RX ADMIN — ENOXAPARIN SODIUM 30 MG: 100 INJECTION SUBCUTANEOUS at 09:49

## 2023-04-22 RX ADMIN — ARFORMOTEROL TARTRATE 15 MCG: 15 SOLUTION RESPIRATORY (INHALATION) at 07:40

## 2023-04-22 RX ADMIN — Medication 10 ML: at 21:57

## 2023-04-22 RX ADMIN — BUDESONIDE 0.5 MG: 0.5 SUSPENSION RESPIRATORY (INHALATION) at 07:40

## 2023-04-22 RX ADMIN — LEVETIRACETAM 500 MG: 5 INJECTION, SOLUTION INTRAVENOUS at 21:55

## 2023-04-22 RX ADMIN — LEVETIRACETAM 500 MG: 5 INJECTION, SOLUTION INTRAVENOUS at 09:50

## 2023-04-22 RX ADMIN — CITALOPRAM HYDROBROMIDE 20 MG: 20 TABLET ORAL at 06:00

## 2023-04-22 RX ADMIN — ACETAMINOPHEN 650 MG: 325 TABLET ORAL at 21:56

## 2023-04-22 RX ADMIN — CEFDINIR 300 MG: 300 CAPSULE ORAL at 09:49

## 2023-04-22 RX ADMIN — Medication 5 MG: at 21:56

## 2023-04-22 RX ADMIN — PANTOPRAZOLE SODIUM 40 MG: 40 INJECTION, POWDER, FOR SOLUTION INTRAVENOUS at 09:50

## 2023-04-22 RX ADMIN — PRAVASTATIN SODIUM 40 MG: 40 TABLET ORAL at 21:56

## 2023-04-22 RX ADMIN — CEFDINIR 300 MG: 300 CAPSULE ORAL at 21:57

## 2023-04-22 RX ADMIN — REVEFENACIN 175 MCG: 175 SOLUTION RESPIRATORY (INHALATION) at 07:40

## 2023-04-22 RX ADMIN — Medication 10 ML: at 09:49

## 2023-04-22 RX ADMIN — LISINOPRIL 20 MG: 20 TABLET ORAL at 10:03

## 2023-04-22 NOTE — PLAN OF CARE
Goal Outcome Evaluation:   BP elevated during shift, contacted MD, see MAR for interventions. Increased tube feedings to goal, patient is tolerating well. Family remains at beside throughout shift. No further complaints noted. Will continue with POC.

## 2023-04-22 NOTE — PROGRESS NOTES
Pulmonary / Critical Care Progress Note      Patient Name: Bessy Leggett  : 1950  MRN: [id number]  Attending:  Loki Iqbal DO  Date of admission: 2023    Subjective   Subjective   Follow-up for pneumonia      underwent bronchoscopy for dense mucous plugging, incidental finding of small left vocal cord polyp, BAL cultures NGTD, cytology negative.    Over the last 24 hours, prednisone discontinued, weaned off supplemental oxygen, remains confused, neurology consult completed revealing vascular dementia, psych consult ordered    No acute events overnight.    On room air  Tolerating tube feeds  Does have confusion    Review of Systems  Confused  Objective   Objective     Vitals:   Temp:  [97.7 °F (36.5 °C)-99.4 °F (37.4 °C)] 98.8 °F (37.1 °C)  Heart Rate:  [] 111  Resp:  [18-22] 22  BP: (134-188)/() 134/80    Physical Exam   Vital Signs Reviewed   General: Frail elderly female, NAD.    HEENT:  PERRL, EOMI.  OP, nares clear  Chest:  good aeration,  faint bilateral wheezing, tympanic to percussion bilaterally, no work of breathing noted on room air  CV: RRR, no MGR, pulses 2+, equal.  Abd:  Soft, NT, ND, + BS, no HSM  EXT:  no clubbing, no cyanosis, no edema  Skin: No rashes or lesions noted       Result Review    Result Review:  I have personally reviewed the results from the time of this admission to 2023 17:40 EDT and agree with these findings:  [x]  Laboratory  [x]  Microbiology  [x]  Radiology  [x]  EKG/Telemetry   []  Cardiology/Vascular   []  Pathology  []  Old records  []  Other:  Most notable findings include:      BAL cultures -NGTD, cytology negative  Pneumonia panel negative  S pneumo/Legionella negative  MRSA PCR negative        Lab 23  0432 23  0602 23  0448 23  0140 23  1255 23  0200 23  1338   WBC 12.74* 7.89 6.89 8.35  --  6.09 12.24*   HEMOGLOBIN 16.4* 14.5 12.5 13.3 13.2 14.2 17.1*   HEMATOCRIT 48.7* 42.3 37.0 39.0 38.1  40.9 50.0*   PLATELETS 252 213 177 231  --  247 292   SODIUM 134* 139 140 140 135* 135* 136   POTASSIUM 4.5 3.9 3.5 3.9 3.7 3.7 3.5   CHLORIDE 99 103 106 105 103 101 97*   CO2 25.6 25.2 27.1 26.7 22.0 24.8 24.8   BUN 15 13 17 10 11 13 11   CREATININE 0.78 0.68 0.74 0.83 0.73 0.80 0.96   GLUCOSE 160* 92 113* 107* 93 181* 131*   CALCIUM 9.9 9.5 8.9 8.9 9.0 9.1 10.0   PHOSPHORUS 2.8  --   --   --   --   --   --    TOTAL PROTEIN  --   --   --   --  5.4*  --  7.2   ALBUMIN  --   --   --   --  3.4*  --  4.0   GLOBULIN  --   --   --   --  2.0  --  3.2       CT Chest With Contrast Diagnostic    Result Date: 4/16/2023  PROCEDURE: CT CHEST W CONTRAST DIAGNOSTIC  COMPARISON:  [facility], CR, XR CHEST 1 VW, 12/17/2021, 3:38.  [facility], CR, XR CHEST 1 VW, 5/01/2022, 13:07.  [facility], CR, XR CHEST 1 VW, 4/16/2023, 13:29.  [facility], CT, CHEST W/O CONTRAST, 11/09/2011, 14:11.  [facility], CT, CT ABDOMEN PELVIS W CONTRAST, 4/04/2023, 18:53. INDICATIONS: Shortness of breath and tachycardia  TECHNIQUE: After obtaining the patient's consent, CT images were obtained with non-ionic intravenous contrast material.   PROTOCOL:   Pulmonary embolism imaging protocol performed    RADIATION:   DLP: 244.4mGy*cm   Automated exposure control was utilized to minimize radiation dose. CONTRAST: 68cc Isovue 370 I.V. LABS:   eGFR: >60ml/min/1.73m2  FINDINGS:  No pulmonary embolism is identified.  A small left pleural effusion is noted.  A small pericardial effusion is evident.  No adenopathy is identified.  Consolidation is noted in the left lower lobe.  There is a prominent bulla noted in the medial aspect of the left lower lobe.  Marked emphysematous changes are noted.  There is an incompletely imaged fusiform abdominal aortic aneurysm measuring at least 5.1 cm AP.  A bifurcated stent graft has been placed.  A cholecystectomy has been performed.  In the right hepatic  lobe on image 267 is a low-density mass consistent with a cyst.  In the left hepatic lobe at the same level is a 0.8 cm hypodense focus, suspected to represent a cyst but too small for definitive characterization.  There is moderate to severe atrophy of the left kidney.  There are suspected bilateral renal cysts.  No focal osseous lesion is seen.      Impression:   1. No evidence of pulmonary embolism 2. Small left pleural and pericardial effusions 3. Consolidation in the left lower lobe may represent atelectasis or pneumonia. 4. Abdominal aortic aneurysm, incompletely imaged on this examination 5. Previous cholecystectomy 6. Moderate to severe atrophy of the left kidney 7. Marked emphysematous changes     Jermaine Biggs M.D.       Electronically Signed and Approved By: Jermaine Biggs M.D. on 4/16/2023 at 17:43             Assessment & Plan   Assessment / Plan     Active Hospital Problems:  Active Hospital Problems    Diagnosis    • **Acute respiratory failure with hypoxia    • Vascular dementia with behavior disturbance    • Severe Malnutrition (HCC)    • Pneumonia due to infectious organism    • Mucus plugging of bronchi    • COPD (chronic obstructive pulmonary disease)      Impression:  Acute hypoxic respiratory failure  Healthcare associat nosocomial pneumonia versus postobstructive pneumonia left lower lobe from unspecified organism  Mucous plugging versus endobronchial lesion left lower lobe  Question aspiration and airways  Lactic acidosis  Sepsis  Acute COPD exacerbation  Chronic dementia  Ongoing tobacco use of cigarettes  Left vocal cord polyp  Acute hyperactive delirium hypokalemia    Plan:  -Currently on room air.    -Continue cefdinir 300 mg twice daily for 7-day total treatment  -BAL cultures NGTD, cytology negative  -Continue Brovana, Pulmicort, Yupelri and albuterol nebulizers  -Continue bronchopulmonary hygiene  -Continue chest physiotherapy    Would be appropriate for comfort measures       DVT  prophylaxis:  Medical DVT prophylaxis orders are present.    CODE STATUS:   Level Of Support Discussed With: Health Care Surrogate; Next of Kin (If No Surrogate)  Code Status (Patient has no pulse and is not breathing): CPR (Attempt to Resuscitate)  Medical Interventions (Patient has pulse or is breathing): Full Support      Labs, imaging, microbiology, notes and medications personally reviewed  Discussed with primary    Electronically signed by Ilan Abdullahi DO, 04/22/23, 5:40 PM EDT.

## 2023-04-22 NOTE — PLAN OF CARE
Goal Outcome Evaluation:  Plan of Care Reviewed With: patient, daughter        Progress: no change  Outcome Evaluation: Patient alert to self, tolerating coretrak and tube feeds well. Patient has been turned L6thmsm. Patient continues to have tremors, shows minor improvment with first dose of keppra. Family at bedside. Patient shows no symptoms of pain or discomfort at this time.

## 2023-04-22 NOTE — CONSULTS
"Nutrition Services    Patient Name: Bessy Leggett  YOB: 1950  MRN: [id number]  Admission date: 4/16/2023      CLINICAL NUTRITION ASSESSMENT      Reason for Assessment  Follow-up protocol, EN   H&P:    Past Medical History:   Diagnosis Date   • Anxiety 05/23/2018    PATIENT REPORTS A LONG HISTORY OF ANXIETY ALONG IWTH AGOURA PHOBIA. SHE HAS BEEN PREVIOUSLY BEEN SEEN BY PSYCH RECENTLY., HER XANAX WAS STOPPED. I WILL REFER THE PATIENT FOR EVALUATION BY MENTAL HEALTH SPECIALIST   • Asthma    • Cataract    • COPD (chronic obstructive pulmonary disease)    • Depression    • GERD (gastroesophageal reflux disease)    • Hypertension    • IBS (irritable bowel syndrome)    • Low back pain         Current Problems:   Active Hospital Problems    Diagnosis    • **Acute respiratory failure with hypoxia    • Vascular dementia with behavior disturbance    • Severe Malnutrition (HCC)    • Pneumonia due to infectious organism    • Mucus plugging of bronchi    • COPD (chronic obstructive pulmonary disease)         Nutrition/Diet History          Patient made NPO on 4/21 after SLP could not recommend safe po intake. Cortrak was placed in duodenum (due to aspiration risk) same day and enteral nutrition was started.     Feeds were advanced to goal rate this morning, and patient is tolerating well per RN. Hyponatremic today with Na+ of 134. Will decrease free water flushes. Other electrolytes WDL. Will CTM.      Anthropometrics        Current Height, Weight Height: 157.5 cm (62\")  Weight: 49.4 kg (108 lb 14.5 oz)   Current BMI Body mass index is 19.92 kg/m².       Weight Hx  Wt Readings from Last 30 Encounters:   04/16/23 1320 49.4 kg (108 lb 14.5 oz)   04/04/23 2100 49.6 kg (109 lb 5.6 oz)   04/04/23 1614 47.6 kg (105 lb)   10/21/22 1131 57.2 kg (126 lb)   05/01/22 1229 62 kg (136 lb 11 oz)   12/17/21 0323 63.2 kg (139 lb 5.3 oz)   01/13/21 0000 59.9 kg (132 lb)   01/10/20 0000 51 kg (112 lb 6 oz)   12/17/19 0000 50.6 kg " (111 lb 8 oz)   09/23/19 0000 50.4 kg (111 lb 2 oz)   07/02/19 0000 47.7 kg (105 lb 2 oz)   04/12/19 0000 47.3 kg (104 lb 6 oz)   01/29/19 0000 49.4 kg (109 lb)   12/14/18 0000 47.2 kg (104 lb)   10/23/18 0000 49.4 kg (109 lb)   10/19/18 0000 49.4 kg (109 lb)   09/17/18 0000 50.3 kg (111 lb)   07/06/18 0000 53.5 kg (118 lb)   06/07/18 0000 54.7 kg (120 lb 8 oz)   05/23/18 0000 58.3 kg (128 lb 8 oz)   03/14/18 0000 56.7 kg (125 lb)   03/05/18 0000 57.6 kg (127 lb)            Wt Change Observation -13.6% x 6 months     Estimated/Assessed Needs       Energy Requirements 30-35 kcal/kg   EST Needs (kcal/day) 6260-3969 kcal       Protein Requirements 1.2-1.5 g/kg   EST Daily Needs (g/day) 59-74 g       Fluid Requirements 1 ml/kcal    Estimated Needs (mL/day) 4460-4262 ml     Labs/Medications         Pertinent Labs Reviewed.   Results from last 7 days   Lab Units 04/22/23  0432 04/21/23  0602 04/20/23  0448 04/18/23  0140 04/17/23  1255 04/17/23  0200 04/16/23  1338   SODIUM mmol/L 134* 139 140   < > 135*   < > 136   POTASSIUM mmol/L 4.5 3.9 3.5   < > 3.7   < > 3.5   CHLORIDE mmol/L 99 103 106   < > 103   < > 97*   CO2 mmol/L 25.6 25.2 27.1   < > 22.0   < > 24.8   BUN mg/dL 15 13 17   < > 11   < > 11   CREATININE mg/dL 0.78 0.68 0.74   < > 0.73   < > 0.96   CALCIUM mg/dL 9.9 9.5 8.9   < > 9.0   < > 10.0   BILIRUBIN mg/dL  --   --   --   --  0.3  --  0.5   ALK PHOS U/L  --   --   --   --  52  --  72   ALT (SGPT) U/L  --   --   --   --  5  --  6   AST (SGOT) U/L  --   --   --   --  12  --  13   GLUCOSE mg/dL 160* 92 113*   < > 93   < > 131*    < > = values in this interval not displayed.     Results from last 7 days   Lab Units 04/22/23  0432 04/17/23  1255 04/17/23  0200 04/16/23  1338   MAGNESIUM mg/dL 2.2  --  1.7 1.8   PHOSPHORUS mg/dL 2.8  --   --   --    HEMOGLOBIN g/dL 16.4*   < > 14.2 17.1*   HEMATOCRIT % 48.7*   < > 40.9 50.0*    < > = values in this interval not displayed.     COVID19   Date Value Ref Range Status    05/01/2022 Not Detected Not Detected - Ref. Range Final     Lab Results   Component Value Date    HGBA1C 5.30 10/21/2022         Pertinent Medications Reviewed.     Current Nutrition Orders & Evaluation of Intake       Oral Nutrition     Current PO Diet NPO Diet NPO Type: Strict NPO   Supplement Orders Placed This Encounter      Dietary Nutrition Supplements Boost Plus (Ensure Plus); vanilla      Place Feeding Tube Per [facility]      Diet, Tube Feeding Tube Feeding Formula: Other (See Comment); Other: Nutren 1.0 Fiber; Tube Feeding Type: Continuous; Continuous Tube Feeding Start Rate (mL/hr): 25; Then Advance Rate By (mL/hr): 20; Every __ Hours: 4; To Goal Rate of (mL/hr): 65       Malnutrition Severity Assessment      Patient meets criteria for : Severe Malnutrition     Malnutrition Severity Assessment      Patient meets criteria for : Severe Malnutrition          Nutrition Diagnosis         Nutrition Dx Problem 1 Severe malnutrition related to increased nutrient needs due to catabolic disease as evidenced by inadequate energy intake., decreased appetite., unintended wt change., body composition changes., patient report., family report., report of minimal PO intake. and COPD     Nutrition Intervention         Nutren 1.0 @ 65 ml/hr  25 ml Q4H FWF     1560 calories, 62 g protein, 1445 ml fluid     Medical Nutrition Therapy/Nutrition Education          Learner     Readiness N/A  N/A     Method     Response N/A  N/A     Monitor/Evaluation        Monitor Per protocol, Pertinent labs, EN delivery/tolerance, Weight, GI status, Symptoms, POC/GOC, Swallow function     Nutrition Discharge Plan         To be determined     Electronically signed by:  Arsen Palomino RD  04/22/23 08:25 EDT

## 2023-04-22 NOTE — PROGRESS NOTES
[facility]   Hospitalist Progress Note  Date: 2023  Patient Name: Bessy Leggett  : 1950  MRN: [id number]  Date of admission: 2023      Subjective   Subjective     Chief Complaint: Follow up for shortness of breath     Summary:  72 y/o F with dementia, COPD not on home oxygen, GERD, chronic low back pain, type II DM, anxiety depression who presented with 4-day history of confusion, shortness of breath and cough.  She had recent hospital admission for GI bleed and COVID.  On presentation she was hypoxic on room air with tachycardia and tachypnea.  proBNP of 1199, WBC 12.24 and lactate 3.4.  CT chest with contrast revealed no evidence of pulmonary embolus, small left pleural effusion, consolidation in left lower lobe and marked emphysematous changes.  Already on nebs, broad-spectrum antibiotics.  Pulmonology consulted.  Underwent bronchoscopy  which showed mucous plugging.  Cultures NGTD, cytology negative.  On cefdinir to complete 7-day course.  Persistent confusion, delirium, psychiatry and neurology evaluated.  MRI with severe white matter changes, microhemorrhages, amyloid angiopathy, likely vascular dementia.  Could not pass swallow eval, core track placed and tolerating tube feeds    Interval Followup: Afebrile.  Blood pressure high.  Tachycardic.  On room air.  Lethargic but arousable, minimally conversant.  Confused.  Tolerating tube feeds.  Denies pain    Review of Systems  Cardiovascular:  No Chest Pain  Respiratory:  No Cough, No Dyspnea  Gastrointestinal:  No Nausea, No Vomiting, No abdominal pain      Objective   Objective     Vitals:   Temp:  [97.2 °F (36.2 °C)-98.6 °F (37 °C)] 98.1 °F (36.7 °C)  Heart Rate:  [] 114  Resp:  [18-20] 20  BP: (151-188)/() 151/91  Physical Exam    Constitutional: Elderly, frail, chronically ill, easily arousable   Eyes: Pupils equal and reactive, no conjunctival injections   HENT: NCAT, Cortrak tube, MMM   Respiratory: Diminished breath  sounds with rhonchi b/l,  nonlabored respirations    Cardiovascular: RRR, no murmurs, no edema   Gastrointestinal: Positive bowel sounds, soft, nondistended   Neurologic: A&Ox1, CN grossly intact, b/l hand tremors, RUE contracture   Skin: Extremities warm, no rashes or wounds    Result Review    Result Review:  I have personally reviewed the following over the last 24 hours (07:00 to 07:00) and agree with the following findings  [x]  Laboratory   CBC        4/20/2023    04:48 4/21/2023    06:02 4/22/2023    04:32   CBC   WBC 6.89   7.89   12.74     RBC 3.95   4.69   5.29     Hemoglobin 12.5   14.5   16.4     Hematocrit 37.0   42.3   48.7     MCV 93.7   90.2   92.1     MCH 31.6   30.9   31.0     MCHC 33.8   34.3   33.7     RDW 12.8   12.8   13.0     Platelets 177   213   252        BMP        4/20/2023    04:48 4/21/2023    06:02 4/22/2023    04:32   BMP   BUN 17   13   15     Creatinine 0.74   0.68   0.78     Sodium 140   139   134     Potassium 3.5   3.9   4.5     Chloride 106   103   99     CO2 27.1   25.2   25.6     Calcium 8.9   9.5   9.9       Magnesium 2.2    [x]  Microbiology  Resp cx normal jeannette  []  Radiology  [x]  EKG/Telemetry monitor personally reviewed: alarms for episodes of SVT up to 150s.    []  Cardiology/Vascular   []  Pathology  []  Old records  [x]  Other:   No intake or output data in the 24 hours ending 04/22/23 1636        Assessment & Plan   Assessment / Plan     Assessment:  · Acute hypoxic respiratory failure  · HCAP versus postobstructive pneumonia left lower lobe from unspecified organism  · Mucous plugging versus endobronchial lesion left lower lobe  · Elevated troponin likely type II MI secondary to demand ischemia from above  · Acute COPD exacerbation  · Ongoing tobacco use of cigarettes  · Left vocal cord polyp  · Acute hyperactive delirium  · Vascular dementia with behavioral discharge  · Insomnia  · Unspecified anxiety disorder  · Sepsis, POA  · Lactic acidosis, POA, clinically  significant -resolved    Plan:     · She is havingt bilateral tremors in the upper extremities and in the left foot along with right upper contracture which family states is not baseline.  Stop citalopram. Wll give a dose of Keppra and monitor for any improvement  · Keep strict NPO.  Continue tube feeds and free water flushes 25 cc every 4 hours  · Continue bowel regimen  · Continue PPI  · /80 following BP meds.  Continue lisinopril 20 mg day  · Continue Brovana/Pulmicort nebs twice daily  · Continue Yupelri daily  · Continue cefdinir 300 mg every 12 hours.  Stop date 4/24  · Pulmonology following, appreciate assistance  · Continue aspirin and statin  · Continue delirium precautions and Haldol as needed  · Lovenox 30 mg daily for DVT prophylaxis  · A.M. labs     Discussed plan with RN and daughter at bedside.     CODE STATUS:   Level Of Support Discussed With: Health Care Surrogate; Next of Kin (If No Surrogate)  Code Status (Patient has no pulse and is not breathing): CPR (Attempt to Resuscitate)  Medical Interventions (Patient has pulse or is breathing): Full Support        Electronically signed by Loki Iqbal DO, 04/22/23, 8:45 AM EDT.

## 2023-04-22 NOTE — PROGRESS NOTES
Respiratory Therapist Broncho-Pulmonary Hygiene Progress Note      Patient Name:  Bessy Leggett  YOB: 1950    Bessy Leggett meets the qualification for Level 1 of the Bronco-Pulmonary Hygiene Protocol. This was based on my daily patient assessment and includes review of chest x-ray results, cough ability and quality, oxygenation, secretions or risk for secretion development and patient mobility.     Broncho-Pulmonary Hygiene Assessment:    Level of Movement: Actively changing positions-requires assistance  Disoriented/Follows Commands    Breath Sounds: Clear to slightly diminished    Cough: Strong, effective and/or frequent    Chest X-Ray: Possible signs of consolidation and/or atelectasis or clear.     Sputum Productions: None or small amount of thin or watery secretions with effective cough    History and Physical: Chronic condition    SpO2 to Oxygen Need: greater than 92% on room air or  less than 3L nasal canula    Current SpO2 is: 95 on room air    Based on this information, I have completed the following interventions: Teach/Instruct patient on cough and deep breathe    At this time, pt bs are clear/dimished bilaterally. Pt reports infrequent coughing. Able to cooperate. May switch to aerobika if there is a need.    Electronically signed by Petra Ortiz RRT, 04/22/23, 7:54 AM EDT.

## 2023-04-23 LAB
ANION GAP SERPL CALCULATED.3IONS-SCNC: 7.7 MMOL/L (ref 5–15)
BUN SERPL-MCNC: 21 MG/DL (ref 8–23)
BUN/CREAT SERPL: 30 (ref 7–25)
CALCIUM SPEC-SCNC: 9.1 MG/DL (ref 8.6–10.5)
CHLORIDE SERPL-SCNC: 99 MMOL/L (ref 98–107)
CO2 SERPL-SCNC: 26.3 MMOL/L (ref 22–29)
CREAT SERPL-MCNC: 0.7 MG/DL (ref 0.57–1)
DEPRECATED RDW RBC AUTO: 45.6 FL (ref 37–54)
EGFRCR SERPLBLD CKD-EPI 2021: 92 ML/MIN/1.73
ERYTHROCYTE [DISTWIDTH] IN BLOOD BY AUTOMATED COUNT: 13.1 % (ref 12.3–15.4)
GLUCOSE BLDC GLUCOMTR-MCNC: 136 MG/DL (ref 70–99)
GLUCOSE BLDC GLUCOMTR-MCNC: 154 MG/DL (ref 70–99)
GLUCOSE BLDC GLUCOMTR-MCNC: 163 MG/DL (ref 70–99)
GLUCOSE BLDC GLUCOMTR-MCNC: 174 MG/DL (ref 70–99)
GLUCOSE SERPL-MCNC: 160 MG/DL (ref 65–99)
HCT VFR BLD AUTO: 43.9 % (ref 34–46.6)
HGB BLD-MCNC: 14.5 G/DL (ref 12–15.9)
MCH RBC QN AUTO: 31.3 PG (ref 26.6–33)
MCHC RBC AUTO-ENTMCNC: 33 G/DL (ref 31.5–35.7)
MCV RBC AUTO: 94.6 FL (ref 79–97)
PLATELET # BLD AUTO: 203 10*3/MM3 (ref 140–450)
PMV BLD AUTO: 9.6 FL (ref 6–12)
POTASSIUM SERPL-SCNC: 4.4 MMOL/L (ref 3.5–5.2)
RBC # BLD AUTO: 4.64 10*6/MM3 (ref 3.77–5.28)
SODIUM SERPL-SCNC: 133 MMOL/L (ref 136–145)
WBC NRBC COR # BLD: 8.2 10*3/MM3 (ref 3.4–10.8)

## 2023-04-23 PROCEDURE — 82962 GLUCOSE BLOOD TEST: CPT

## 2023-04-23 PROCEDURE — 25010000002 ENOXAPARIN PER 10 MG: Performed by: INTERNAL MEDICINE

## 2023-04-23 PROCEDURE — 80048 BASIC METABOLIC PNL TOTAL CA: CPT | Performed by: INTERNAL MEDICINE

## 2023-04-23 PROCEDURE — 94799 UNLISTED PULMONARY SVC/PX: CPT

## 2023-04-23 PROCEDURE — 25010000002 LEVETIRACETAM IN NACL 0.82% 500 MG/100ML SOLUTION: Performed by: INTERNAL MEDICINE

## 2023-04-23 PROCEDURE — 94761 N-INVAS EAR/PLS OXIMETRY MLT: CPT

## 2023-04-23 PROCEDURE — 25010000002 LEVETRIRACETAM PER 10 MG: Performed by: INTERNAL MEDICINE

## 2023-04-23 PROCEDURE — 85027 COMPLETE CBC AUTOMATED: CPT | Performed by: INTERNAL MEDICINE

## 2023-04-23 PROCEDURE — 63710000001 REVEFENACIN 175 MCG/3ML SOLUTION: Performed by: INTERNAL MEDICINE

## 2023-04-23 PROCEDURE — 99233 SBSQ HOSP IP/OBS HIGH 50: CPT | Performed by: INTERNAL MEDICINE

## 2023-04-23 RX ADMIN — CEFDINIR 300 MG: 300 CAPSULE ORAL at 20:35

## 2023-04-23 RX ADMIN — LISINOPRIL 20 MG: 20 TABLET ORAL at 08:25

## 2023-04-23 RX ADMIN — ENOXAPARIN SODIUM 30 MG: 100 INJECTION SUBCUTANEOUS at 08:25

## 2023-04-23 RX ADMIN — ACETAMINOPHEN 650 MG: 325 TABLET ORAL at 05:11

## 2023-04-23 RX ADMIN — ARFORMOTEROL TARTRATE 15 MCG: 15 SOLUTION RESPIRATORY (INHALATION) at 18:46

## 2023-04-23 RX ADMIN — SENNOSIDES AND DOCUSATE SODIUM 2 TABLET: 8.6; 5 TABLET ORAL at 20:35

## 2023-04-23 RX ADMIN — CEFDINIR 300 MG: 300 CAPSULE ORAL at 08:25

## 2023-04-23 RX ADMIN — Medication 10 ML: at 08:25

## 2023-04-23 RX ADMIN — PRAVASTATIN SODIUM 40 MG: 40 TABLET ORAL at 20:35

## 2023-04-23 RX ADMIN — REVEFENACIN 175 MCG: 175 SOLUTION RESPIRATORY (INHALATION) at 07:45

## 2023-04-23 RX ADMIN — ACETAMINOPHEN 650 MG: 325 TABLET ORAL at 14:04

## 2023-04-23 RX ADMIN — BUDESONIDE 0.5 MG: 0.5 SUSPENSION RESPIRATORY (INHALATION) at 07:45

## 2023-04-23 RX ADMIN — ARFORMOTEROL TARTRATE 15 MCG: 15 SOLUTION RESPIRATORY (INHALATION) at 07:45

## 2023-04-23 RX ADMIN — Medication 10 ML: at 20:35

## 2023-04-23 RX ADMIN — BUDESONIDE 0.5 MG: 0.5 SUSPENSION RESPIRATORY (INHALATION) at 18:46

## 2023-04-23 RX ADMIN — PANTOPRAZOLE SODIUM 40 MG: 40 INJECTION, POWDER, FOR SOLUTION INTRAVENOUS at 05:11

## 2023-04-23 RX ADMIN — ACETAMINOPHEN 650 MG: 325 TABLET ORAL at 20:35

## 2023-04-23 RX ADMIN — LEVETIRACETAM 250 MG: 100 INJECTION, SOLUTION INTRAVENOUS at 21:34

## 2023-04-23 RX ADMIN — SENNOSIDES AND DOCUSATE SODIUM 2 TABLET: 8.6; 5 TABLET ORAL at 08:25

## 2023-04-23 RX ADMIN — LEVETIRACETAM 500 MG: 5 INJECTION, SOLUTION INTRAVENOUS at 08:25

## 2023-04-23 NOTE — PROGRESS NOTES
[facility]   Hospitalist Progress Note  Date: 2023  Patient Name: Bessy Leggett  : 1950  MRN: [id number]  Date of admission: 2023      Subjective   Subjective     Chief Complaint: Follow up for shortness of breath     Summary:  74 y/o F with dementia, COPD not on home oxygen, GERD, chronic low back pain, type II DM, anxiety depression who presented with 4-day history of confusion, shortness of breath and cough.  She had recent hospital admission for GI bleed and COVID.  On presentation she was hypoxic on room air with tachycardia and tachypnea.  proBNP of 1199, WBC 12.24 and lactate 3.4.  CT chest with contrast revealed no evidence of pulmonary embolus, small left pleural effusion, consolidation in left lower lobe and marked emphysematous changes.  Already on nebs, broad-spectrum antibiotics.  Pulmonology consulted.  Underwent bronchoscopy  which showed mucous plugging.  Cultures NGTD, cytology negative.  On cefdinir to complete 7-day course.  Persistent confusion, delirium, psychiatry and neurology evaluated.  MRI with severe white matter changes, microhemorrhages, amyloid angiopathy, likely vascular dementia.  Could not pass swallow eval, core track placed and tolerating tube feeds    Interval Followup: No overnight events.  Blood pressure better.  Remains on room air.  Continues to have waxing and waning mental status.  Family states she did answer a few questions earlier but continues to have periods of confusion.  Tremors are better today.  Tolerating tube feeds at 65 cc/h.    Review of Systems  UTO due to confusion    Objective   Objective     Vitals:   Temp:  [97.7 °F (36.5 °C)-99 °F (37.2 °C)] 99 °F (37.2 °C)  Heart Rate:  [] 89  Resp:  [20-22] 20  BP: (123-150)/(58-76) 123/65  Flow (L/min):  [0] 0  Physical Exam    Constitutional: Elderly, frail, chronically ill, easily arousable   Eyes: Pupils equal and reactive, no conjunctival injections   HENT: NCAT, Cortrak tube,  MMM   Respiratory: Diminished breath sounds with rhonchi b/l,  nonlabored respirations    Cardiovascular: RRR, no murmurs, no edema   Gastrointestinal: Positive bowel sounds, soft, nondistended   Neurologic: A&Ox1, CN grossly intact, no tremors appreciated   Skin: Extremities warm, no rashes or wounds    Result Review    Result Review:  I have personally reviewed the following over the last 24 hours (07:00 to 07:00) and agree with the following findings  [x]  Laboratory   CBC        4/21/2023    06:02 4/22/2023    04:32 4/23/2023    07:38   CBC   WBC 7.89   12.74   8.20     RBC 4.69   5.29   4.64     Hemoglobin 14.5   16.4   14.5     Hematocrit 42.3   48.7   43.9     MCV 90.2   92.1   94.6     MCH 30.9   31.0   31.3     MCHC 34.3   33.7   33.0     RDW 12.8   13.0   13.1     Platelets 213   252   203        BMP        4/21/2023    06:02 4/22/2023    04:32 4/23/2023    07:38   BMP   BUN 13   15   21     Creatinine 0.68   0.78   0.70     Sodium 139   134   133     Potassium 3.9   4.5   4.4     Chloride 103   99   99     CO2 25.2   25.6   26.3     Calcium 9.5   9.9   9.1         [x]  Microbiology  Resp cx normal jeannette  BAL cultures negative  []  Radiology  []  EKG/Telemetry   []  Cardiology/Vascular   []  Pathology  []  Old records  [x]  Other:     Intake/Output Summary (Last 24 hours) at 4/23/2023 1405  Last data filed at 4/23/2023 1100  Gross per 24 hour   Intake 2743 ml   Output 700 ml   Net 2043 ml           Assessment & Plan   Assessment / Plan     Assessment:  · Acute hypoxic respiratory failure  · HCAP versus postobstructive pneumonia left lower lobe from unspecified organism  · Mucous plugging versus endobronchial lesion left lower lobe  · Elevated troponin likely type II MI secondary to demand ischemia from above  · Acute COPD exacerbation  · Ongoing tobacco use of cigarettes  · Left vocal cord polyp  · Acute hyperactive delirium  · Vascular dementia with behavioral discharge  · Insomnia  · Unspecified anxiety  disorder  · Sepsis, POA  · Lactic acidosis, POA, clinically significant -resolved    Plan:     · Tremors and shaking better today. Does appear more somnolent but remains arousable.  Will continue Keppra but reduce to 250 mg q12 hours.  · Keep strict NPO.  Continue tube feeds and free water flushes 25 cc every 4 hours. SLP to reevaluate tomorrow.  · Continue bowel regimen and PPI  · BP controlled.  Continue lisinopril 20 mg day   · Continue Brovana/Pulmicort nebs twice daily. Continue Yupelri daily  · All cultures remain negative.  Continue cefdinir 300 mg every 12 hours.  Stop date 4/24  · Pulmonology following, appreciate assistance  · Continue aspirin and statin  · Continue delirium precautions and Haldol as needed.  Avoid benzodiazepine  · Lovenox 30 mg daily for DVT prophylaxis  · Will consult palliative for goals of care discussion hospice education  · A.M. labs     Discussed plan with RN and daughter at bedside.     CODE STATUS:   Level Of Support Discussed With: Health Care Surrogate; Next of Kin (If No Surrogate)  Code Status (Patient has no pulse and is not breathing): CPR (Attempt to Resuscitate)  Medical Interventions (Patient has pulse or is breathing): Full Support    Electronically signed by Loki Iqbal DO, 04/23/23, 2:07 PM EDT.

## 2023-04-23 NOTE — PROGRESS NOTES
Respiratory Therapist Broncho-Pulmonary Hygiene Progress Note      Patient Name:  Bessy Leggett  YOB: 1950    Bessy Leggett meets the qualification for LEVEL 2 of the Bronco-Pulmonary Hygiene Protocol. This was based on my daily patient assessment and includes review of chest x-ray results, cough ability and quality, oxygenation, secretions or risk for secretion development and patient mobility.     Broncho-Pulmonary Hygiene Assessment:    Level of Movement: Actively changing positions-requires assistance  Disoriented/Follows Commands    Breath Sounds: Clear to slightly diminished    Cough: Strong, effective and/or frequent    Chest X-Ray: Possible signs of consolidation and/or atelectasis or clear.     Sputum Productions: None or small amount of thin or watery secretions with effective cough    History and Physical: Chronic condition    SpO2 to Oxygen Need: greater than 92% on room air or  less than 3L nasal canula    Current SpO2 is: 96 on room air      Based on this information, I have completed the following interventions: Aerobika with bronchodialtor medication or TID    No changes from previous note. Will do flutter valve for CPT.    Electronically signed by Petra Ortiz RRT, 04/23/23, 7:50 AM EDT.

## 2023-04-23 NOTE — PLAN OF CARE
Problem: Adult Inpatient Plan of Care  Goal: Plan of Care Review  Outcome: Ongoing, Progressing  Flowsheets  Taken 4/23/2023 1654 by Masha Costa, RN  Outcome Evaluation: No acute changes, patient remains quiet in bed, opens eyes occasionally and softly speaks very few words. tele d/c'd. coretrak continued at full strength, tolerating well. family remains at bedside. will continue to monitor.  Taken 4/23/2023 0427 by Stacie Meyer, RN  Plan of Care Reviewed With:   patient   daughter  Taken 4/22/2023 1648 by Mignon Peña, RN  Progress: no change   Goal Outcome Evaluation:              Outcome Evaluation: No acute changes, patient remains quiet in bed, opens eyes occasionally and softly speaks very few words. tele d/c'd. coretrak continued at full strength, tolerating well. family remains at bedside. will continue to monitor.

## 2023-04-24 LAB
CK SERPL-CCNC: 114 U/L (ref 20–180)
GLUCOSE BLDC GLUCOMTR-MCNC: 103 MG/DL (ref 70–99)
GLUCOSE BLDC GLUCOMTR-MCNC: 118 MG/DL (ref 70–99)
GLUCOSE BLDC GLUCOMTR-MCNC: 120 MG/DL (ref 70–99)
GLUCOSE BLDC GLUCOMTR-MCNC: 152 MG/DL (ref 70–99)
GLUCOSE BLDC GLUCOMTR-MCNC: 158 MG/DL (ref 70–99)
MAGNESIUM SERPL-MCNC: 2.1 MG/DL (ref 1.6–2.4)
PHOSPHATE SERPL-MCNC: 3.1 MG/DL (ref 2.5–4.5)

## 2023-04-24 PROCEDURE — 83735 ASSAY OF MAGNESIUM: CPT | Performed by: INTERNAL MEDICINE

## 2023-04-24 PROCEDURE — 82962 GLUCOSE BLOOD TEST: CPT

## 2023-04-24 PROCEDURE — 92526 ORAL FUNCTION THERAPY: CPT

## 2023-04-24 PROCEDURE — 99233 SBSQ HOSP IP/OBS HIGH 50: CPT | Performed by: INTERNAL MEDICINE

## 2023-04-24 PROCEDURE — 25010000002 LEVETRIRACETAM PER 10 MG: Performed by: INTERNAL MEDICINE

## 2023-04-24 PROCEDURE — 94799 UNLISTED PULMONARY SVC/PX: CPT

## 2023-04-24 PROCEDURE — 94760 N-INVAS EAR/PLS OXIMETRY 1: CPT

## 2023-04-24 PROCEDURE — 94761 N-INVAS EAR/PLS OXIMETRY MLT: CPT

## 2023-04-24 PROCEDURE — 82550 ASSAY OF CK (CPK): CPT | Performed by: INTERNAL MEDICINE

## 2023-04-24 PROCEDURE — 99232 SBSQ HOSP IP/OBS MODERATE 35: CPT | Performed by: STUDENT IN AN ORGANIZED HEALTH CARE EDUCATION/TRAINING PROGRAM

## 2023-04-24 PROCEDURE — 84100 ASSAY OF PHOSPHORUS: CPT | Performed by: INTERNAL MEDICINE

## 2023-04-24 PROCEDURE — 97110 THERAPEUTIC EXERCISES: CPT

## 2023-04-24 PROCEDURE — 25010000002 ENOXAPARIN PER 10 MG: Performed by: INTERNAL MEDICINE

## 2023-04-24 PROCEDURE — 63710000001 REVEFENACIN 175 MCG/3ML SOLUTION: Performed by: INTERNAL MEDICINE

## 2023-04-24 RX ORDER — UMECLIDINIUM BROMIDE AND VILANTEROL TRIFENATATE 62.5; 25 UG/1; UG/1
POWDER RESPIRATORY (INHALATION)
Qty: 60 EACH | Refills: 2 | Status: SHIPPED | OUTPATIENT
Start: 2023-04-24

## 2023-04-24 RX ORDER — ACETAMINOPHEN 325 MG/1
650 TABLET ORAL EVERY 6 HOURS
Status: DISCONTINUED | OUTPATIENT
Start: 2023-04-24 | End: 2023-04-26 | Stop reason: HOSPADM

## 2023-04-24 RX ADMIN — ACETAMINOPHEN 650 MG: 325 TABLET ORAL at 12:56

## 2023-04-24 RX ADMIN — LEVETIRACETAM 250 MG: 100 INJECTION, SOLUTION INTRAVENOUS at 21:25

## 2023-04-24 RX ADMIN — BUDESONIDE 0.5 MG: 0.5 SUSPENSION RESPIRATORY (INHALATION) at 18:56

## 2023-04-24 RX ADMIN — PANTOPRAZOLE SODIUM 40 MG: 40 INJECTION, POWDER, FOR SOLUTION INTRAVENOUS at 05:33

## 2023-04-24 RX ADMIN — SENNOSIDES AND DOCUSATE SODIUM 2 TABLET: 8.6; 5 TABLET ORAL at 20:15

## 2023-04-24 RX ADMIN — BUDESONIDE 0.5 MG: 0.5 SUSPENSION RESPIRATORY (INHALATION) at 07:15

## 2023-04-24 RX ADMIN — SENNOSIDES AND DOCUSATE SODIUM 2 TABLET: 8.6; 5 TABLET ORAL at 08:22

## 2023-04-24 RX ADMIN — LEVETIRACETAM 250 MG: 100 INJECTION, SOLUTION INTRAVENOUS at 09:02

## 2023-04-24 RX ADMIN — ACETAMINOPHEN 650 MG: 325 TABLET ORAL at 20:13

## 2023-04-24 RX ADMIN — Medication 10 ML: at 08:23

## 2023-04-24 RX ADMIN — REVEFENACIN 175 MCG: 175 SOLUTION RESPIRATORY (INHALATION) at 07:15

## 2023-04-24 RX ADMIN — ARFORMOTEROL TARTRATE 15 MCG: 15 SOLUTION RESPIRATORY (INHALATION) at 07:15

## 2023-04-24 RX ADMIN — Medication 10 ML: at 20:15

## 2023-04-24 RX ADMIN — ENOXAPARIN SODIUM 30 MG: 100 INJECTION SUBCUTANEOUS at 09:01

## 2023-04-24 RX ADMIN — PRAVASTATIN SODIUM 40 MG: 40 TABLET ORAL at 20:14

## 2023-04-24 RX ADMIN — CEFDINIR 300 MG: 300 CAPSULE ORAL at 08:23

## 2023-04-24 RX ADMIN — LISINOPRIL 20 MG: 20 TABLET ORAL at 08:22

## 2023-04-24 RX ADMIN — ARFORMOTEROL TARTRATE 15 MCG: 15 SOLUTION RESPIRATORY (INHALATION) at 18:56

## 2023-04-24 NOTE — PLAN OF CARE
Goal Outcome Evaluation:  Plan of Care Reviewed With: patient, daughter, siblingVITALS STABLE , Q2 HOUR ASSISTED TURN, CORTRAC  WITH  NUTREN 1 AT 65CC HOUR PT TOLERATING WELL , DAUGHTER AND BROTHER AT BEDSIDE .Gabriela Miller RN

## 2023-04-24 NOTE — THERAPY TREATMENT NOTE
Acute Care - Physical Therapy Treatment Note   [facility]     Patient Name: Bessy Leggett  : 1950  MRN: [id number]  Today's Date: 2023      Visit Dx:     ICD-10-CM ICD-9-CM   1. COPD exacerbation  J44.1 491.21   2. Acute respiratory failure with hypoxia  J96.01 518.81   3. Pneumonia due to infectious organism, unspecified laterality, unspecified part of lung  J18.9 486   4. Mucus plugging of bronchi  T17.500A 519.19   5. Difficulty walking  R26.2 719.7   6. Decreased activities of daily living (ADL)  Z78.9 V49.89   7. Oropharyngeal dysphagia  R13.12 787.22     Patient Active Problem List   Diagnosis   • AAA (abdominal aortic aneurysm) without rupture   • Hypertension   • Anxiety   • Asthma   • Cataract   • COPD (chronic obstructive pulmonary disease)   • Depression   • Non-insulin dependent type 2 diabetes mellitus   • Family history of blood clots   • GERD (gastroesophageal reflux disease)   • History of AAA (abdominal aortic aneurysm) repair   • History of seizure   • Hypercholesterolemia   • IBS (irritable bowel syndrome)   • Low back pain   • Melena   • Epigastric pain   • Intestinal metaplasia of stomach   • Acute respiratory failure with hypoxia   • Severe Malnutrition (HCC)   • Pneumonia due to infectious organism   • Mucus plugging of bronchi   • Vascular dementia with behavior disturbance     Past Medical History:   Diagnosis Date   • Anxiety 2018    PATIENT REPORTS A LONG HISTORY OF ANXIETY ALONG IWTH AGOURA PHOBIA. SHE HAS BEEN PREVIOUSLY BEEN SEEN BY PSYCH RECENTLY., HER XANAX WAS STOPPED. I WILL REFER THE PATIENT FOR EVALUATION BY MENTAL HEALTH SPECIALIST   • Asthma    • Cataract    • COPD (chronic obstructive pulmonary disease)    • Depression    • GERD (gastroesophageal reflux disease)    • Hypertension    • IBS (irritable bowel syndrome)    • Low back pain      Past Surgical History:   Procedure Laterality Date   • ABDOMINAL SURGERY     • BRONCHOSCOPY N/A 2023    Procedure:  BRONCHOSCOPY WITH BRONCHIOALVEOLAR LAVAGE/WASHINGS;  Surgeon: Cabrera Obando MD;  Location: [facility] ENDOSCOPY;  Service: Pulmonary;  Laterality: N/A;  MUCOUS PLUGGING   • COLONOSCOPY     • ENDOSCOPY N/A 4/5/2023    Procedure: ESOPHAGOGASTRODUODENOSCOPY WITH BIOPSIES;  Surgeon: Shaina Pastrana MD;  Location: [facility] ENDOSCOPY;  Service: Gastroenterology;  Laterality: N/A;  GASTRITIS  HIATAL HERNIA   • GALLBLADDER SURGERY     • HYSTERECTOMY     • VASCULAR SURGERY       PT Assessment (last 12 hours)     PT Evaluation and Treatment     Row Name 04/24/23 1300          Physical Therapy Time and Intention    Subjective Information no complaints  -RH     Document Type therapy note (daily note)  -RH     Mode of Treatment physical therapy;individual therapy  -     Patient Effort fair  -RH     Row Name 04/24/23 1300          Pain Scale: FACES Pre/Post-Treatment    Pain: FACES Scale, Pretreatment 0-->no hurt  -RH     Posttreatment Pain Rating 0-->no hurt  -RH     Row Name 04/24/23 1300          Vital Signs    O2 Delivery Intra Treatment room air  -     Row Name 04/24/23 1300          Progress Summary (PT)    Progress Toward Functional Goals (PT) progress toward functional goals is gradual  -RH           User Key  (r) = Recorded By, (t) = Taken By, (c) = Cosigned By    Initials Name Provider Type    RH Royer Quesada PTA Physical Therapist Assistant            Bilateral Lower Extremity   Exercise  Reps  Sets    Short arc quads   10 2   Heel slides  10 2   Ankle pumps  10 2   Quad sets  10 2   Straight leg raise  10 2   Hip ab/adduction 10 2            PT Recommendation and Plan     Progress Summary (PT)  Progress Toward Functional Goals (PT): progress toward functional goals is gradual   Outcome Measures     Row Name 04/24/23 1300             How much help from another person do you currently need...    Turning from your back to your side while in flat bed without using bedrails? 2  -RH      Moving from lying on back  to sitting on the side of a flat bed without bedrails? 2  -RH      Moving to and from a bed to a chair (including a wheelchair)? 2  -RH      Standing up from a chair using your arms (e.g., wheelchair, bedside chair)? 2  -RH      Climbing 3-5 steps with a railing? 1  -RH      To walk in hospital room? 1  -RH      AM-PAC 6 Clicks Score (PT) 10  -RH            User Key  (r) = Recorded By, (t) = Taken By, (c) = Cosigned By    Initials Name Provider Type     Royer Quesada PTA Physical Therapist Assistant                 Time Calculation:    PT Charges     Row Name 04/24/23 1347             Time Calculation    PT Received On 04/24/23  -RH         Timed Charges    45760 - PT Therapeutic Exercise Minutes 23  -RH         Total Minutes    Timed Charges Total Minutes 23  -RH       Total Minutes 23  -RH            User Key  (r) = Recorded By, (t) = Taken By, (c) = Cosigned By    Initials Name Provider Type     Royer Quesada PTA Physical Therapist Assistant              Therapy Charges for Today     Code Description Service Date Service Provider Modifiers Qty    72230851719 HC PT THER PROC EA 15 MIN 4/24/2023 Royer Quesada PTA GP 2          PT G-Codes  Outcome Measure Options: AM-PAC 6 Clicks Daily Activity (OT), Optimal Instrument  AM-PAC 6 Clicks Score (PT): 10  AM-PAC 6 Clicks Score (OT): 12    Royer Quesada PTA  4/24/2023

## 2023-04-24 NOTE — PROGRESS NOTES
Pulmonary / Critical Care Progress Note      Patient Name: Bessy Leggett  : 1950  MRN: [id number]  Attending:  Loki Iqbal DO  Date of admission: 2023    Subjective   Subjective   Follow-up for pneumonia      underwent bronchoscopy for dense mucous plugging, incidental finding of small left vocal cord polyp, BAL cultures NGTD, cytology negative.    No acute events overnight.    This morning,  Lying in bed on room air  Family at bedside  Remains confused  Weak cough  No dyspnea  Right core track in place  Tolerating tube feeds  Remains weak and fatigued  No fever    Review of Systems  General: Fatigued, confused, otherwise denied complaints  HEENT: Denied complaints  Respiratory: Weak cough, otherwise denied complaints  Cardiovascular: Denied complaints  GI: Denied complaints  : Denied complaints  MSK: Weak, otherwise denied complaints    Objective   Objective     Vitals:   Temp:  [97.2 °F (36.2 °C)-98.4 °F (36.9 °C)] 97.3 °F (36.3 °C)  Heart Rate:  [] 98  Resp:  [18-22] 22  BP: (120-157)/() 141/73    Physical Exam   Vital Signs Reviewed   General:  NAD, elderly female  HEENT:  PERRL, EOMI.    Neck:  No JVD, no thyromegaly  Lymph: no axillary, cervical, supraclavicular lymphadenopathy noted bilaterally  Chest:  Clear to auscultation bilaterally, no work of breathing noted on room air  CV: RRR, no M/G/R, pulses 2+  Abd:  Soft, NT, ND, +BS  EXT:  no clubbing, no cyanosis, no edema  Neuro: CN grossly intact, no focal deficits.  Skin: No rashes or lesions noted      Result Review    Result Review:  I have personally reviewed the results from the time of this admission to 2023 14:12 EDT and agree with these findings:  [x]  Laboratory  [x]  Microbiology  [x]  Radiology  []  EKG/Telemetry   []  Cardiology/Vascular   []  Pathology  []  Old records  []  Other:  Most notable findings include:      BAL cultures -NGTD, cytology negative  Pneumonia panel negative  S pneumo/Legionella  negative  MRSA PCR negative        Lab 04/24/23  0512 04/23/23  0738 04/22/23  0432 04/21/23  0602 04/20/23  0448 04/18/23  0140   WBC  --  8.20 12.74* 7.89 6.89 8.35   HEMOGLOBIN  --  14.5 16.4* 14.5 12.5 13.3   HEMATOCRIT  --  43.9 48.7* 42.3 37.0 39.0   PLATELETS  --  203 252 213 177 231   SODIUM  --  133* 134* 139 140 140   POTASSIUM  --  4.4 4.5 3.9 3.5 3.9   CHLORIDE  --  99 99 103 106 105   CO2  --  26.3 25.6 25.2 27.1 26.7   BUN  --  21 15 13 17 10   CREATININE  --  0.70 0.78 0.68 0.74 0.83   GLUCOSE  --  160* 160* 92 113* 107*   CALCIUM  --  9.1 9.9 9.5 8.9 8.9   PHOSPHORUS 3.1  --  2.8  --   --   --        CT Chest With Contrast Diagnostic    Result Date: 4/16/2023  PROCEDURE: CT CHEST W CONTRAST DIAGNOSTIC  COMPARISON:  [facility], CR, XR CHEST 1 VW, 12/17/2021, 3:38.  [facility], CR, XR CHEST 1 VW, 5/01/2022, 13:07.  [facility], CR, XR CHEST 1 VW, 4/16/2023, 13:29.  [facility], CT, CHEST W/O CONTRAST, 11/09/2011, 14:11.  [facility], CT, CT ABDOMEN PELVIS W CONTRAST, 4/04/2023, 18:53. INDICATIONS: Shortness of breath and tachycardia  TECHNIQUE: After obtaining the patient's consent, CT images were obtained with non-ionic intravenous contrast material.   PROTOCOL:   Pulmonary embolism imaging protocol performed    RADIATION:   DLP: 244.4mGy*cm   Automated exposure control was utilized to minimize radiation dose. CONTRAST: 68cc Isovue 370 I.V. LABS:   eGFR: >60ml/min/1.73m2  FINDINGS:  No pulmonary embolism is identified.  A small left pleural effusion is noted.  A small pericardial effusion is evident.  No adenopathy is identified.  Consolidation is noted in the left lower lobe.  There is a prominent bulla noted in the medial aspect of the left lower lobe.  Marked emphysematous changes are noted.  There is an incompletely imaged fusiform abdominal aortic aneurysm measuring at least 5.1 cm AP.  A bifurcated stent graft has been  placed.  A cholecystectomy has been performed.  In the right hepatic lobe on image 267 is a low-density mass consistent with a cyst.  In the left hepatic lobe at the same level is a 0.8 cm hypodense focus, suspected to represent a cyst but too small for definitive characterization.  There is moderate to severe atrophy of the left kidney.  There are suspected bilateral renal cysts.  No focal osseous lesion is seen.      Impression:   1. No evidence of pulmonary embolism 2. Small left pleural and pericardial effusions 3. Consolidation in the left lower lobe may represent atelectasis or pneumonia. 4. Abdominal aortic aneurysm, incompletely imaged on this examination 5. Previous cholecystectomy 6. Moderate to severe atrophy of the left kidney 7. Marked emphysematous changes     Jermaine Biggs M.D.       Electronically Signed and Approved By: Jermaine Biggs M.D. on 4/16/2023 at 17:43             Assessment & Plan   Assessment / Plan     Active Hospital Problems:  Active Hospital Problems    Diagnosis    • **Acute respiratory failure with hypoxia    • Vascular dementia with behavior disturbance    • Severe Malnutrition (HCC)    • Pneumonia due to infectious organism    • Mucus plugging of bronchi    • COPD (chronic obstructive pulmonary disease)      Impression:  Acute hypoxic respiratory failure  Healthcare associat nosocomial pneumonia versus postobstructive pneumonia left lower lobe from unspecified organism  Mucous plugging versus endobronchial lesion left lower lobe  Question aspiration and airways  Lactic acidosis  Sepsis  Acute COPD exacerbation  Chronic dementia  Ongoing tobacco use of cigarettes  Left vocal cord polyp  Acute hyperactive delirium hypokalemia    Plan:  -Currently on room air, continue O2 if needed to maintain SPO2 greater than 90%  -Continue cefdinir 300 mg twice daily for 7-day total treatment  -BAL cultures NGTD, cytology negative  -Continue Brovana, Pulmicort, Yupelri and albuterol  nebulizers  -Continue bronchopulmonary hygiene  -Continue chest physiotherapy  -Encourage I-S/flutter valve  -Palliative care on board, appreciate assistance  -SLP on board, appreciate assistance  -PT/OT on board, appreciate assistance  -Encourage mobilization, out of bed to chair  -May benefit from inpatient rehab  -Small left vocal cord polyp incidentally noted on bronchoscopy, recommend outpatient ENT follow-up upon discharge    DVT prophylaxis:  Medical DVT prophylaxis orders are present.    CODE STATUS:   Level Of Support Discussed With: Health Care Surrogate; Next of Kin (If No Surrogate)  Code Status (Patient has no pulse and is not breathing): CPR (Attempt to Resuscitate)  Medical Interventions (Patient has pulse or is breathing): Full Support    This patient was seen by both a physician and a NP. I, Barry Vazquez MD, spent >50% of time in accordance with split shared billing. This included personally reviewing all pertinent labs, imaging, microbiology and documentation. Also discussing the case with the patient and any available family, the admitting physician and any available ancillary staff.     Electronically signed by Barry Vazquez MD, 04/24/23, 5:04 PM EDT.

## 2023-04-24 NOTE — CONSULTS
Purpose of the visit was to evaluate for: goals of care/advanced care planning. Spoke with RN and family and discussed palliative care, care options, [facility] and determination of decision maker.      Assessment:The patient is on 4mtu, cortrack with tube feeds and external cath. NO s/s of pain, nausea or anxiety at this time.    Recommendations/Plan: Family meeting 4/25 at 1:30 with palliative care and provider..    Tasks Completed: Emotional Support.    Other Comments: Palliative care spoke with connie Gutiérrez at the bedside. The patient is  and has four daughters. The children are currently in the process of completing legal paperwork for POA. The daughter Marla reports the family would like to all be available to discuss the patients prognosis and options for care so that they can get on the same page to help with making decisions. Primary nurse updated.    Family meeting 4/25 at 1:30 with palliative care and provider.    -Katy EPPS, RN, [facility]   Palliative Care    4/24/2023 16:10 EDT

## 2023-04-24 NOTE — PLAN OF CARE
Problem: Adult Inpatient Plan of Care  Goal: Plan of Care Review  Outcome: Ongoing, Progressing  Flowsheets (Taken 4/24/2023 1619)  Progress: improving  Plan of Care Reviewed With:   patient   daughter  Outcome Evaluation: Patient alert to self only throughout shift. VSS. Patient is on room air. Pain treated per MAR. Coretrak continuted at full strength, tolerating well. No complaints at this time. Q2T maintained on shift. Continuing with plan of care.   Goal Outcome Evaluation:  Plan of Care Reviewed With: patient, daughter        Progress: improving  Outcome Evaluation: Patient alert to self only throughout shift. VSS. Patient is on room air. Pain treated per MAR. Coretrak continuted at full strength, tolerating well. No complaints at this time. Q2T maintained on shift. Continuing with plan of care.

## 2023-04-24 NOTE — THERAPY TREATMENT NOTE
Acute Care - Speech Language Pathology   Swallow Treatment Note ANNA Bernabe     Patient Name: Bessy Leggett  : 1950  MRN: [id number]  Today's Date: 2023               Admit Date: 2023    Visit Dx:     ICD-10-CM ICD-9-CM   1. COPD exacerbation  J44.1 491.21   2. Acute respiratory failure with hypoxia  J96.01 518.81   3. Pneumonia due to infectious organism, unspecified laterality, unspecified part of lung  J18.9 486   4. Mucus plugging of bronchi  T17.500A 519.19   5. Difficulty walking  R26.2 719.7   6. Decreased activities of daily living (ADL)  Z78.9 V49.89   7. Oropharyngeal dysphagia  R13.12 787.22     Patient Active Problem List   Diagnosis   • AAA (abdominal aortic aneurysm) without rupture   • Hypertension   • Anxiety   • Asthma   • Cataract   • COPD (chronic obstructive pulmonary disease)   • Depression   • Non-insulin dependent type 2 diabetes mellitus   • Family history of blood clots   • GERD (gastroesophageal reflux disease)   • History of AAA (abdominal aortic aneurysm) repair   • History of seizure   • Hypercholesterolemia   • IBS (irritable bowel syndrome)   • Low back pain   • Melena   • Epigastric pain   • Intestinal metaplasia of stomach   • Acute respiratory failure with hypoxia   • Severe Malnutrition (HCC)   • Pneumonia due to infectious organism   • Mucus plugging of bronchi   • Vascular dementia with behavior disturbance     Past Medical History:   Diagnosis Date   • Anxiety 2018    PATIENT REPORTS A LONG HISTORY OF ANXIETY ALONG IWTH AGOURA PHOBIA. SHE HAS BEEN PREVIOUSLY BEEN SEEN BY PSYCH RECENTLY., HER XANAX WAS STOPPED. I WILL REFER THE PATIENT FOR EVALUATION BY MENTAL HEALTH SPECIALIST   • Asthma    • Cataract    • COPD (chronic obstructive pulmonary disease)    • Depression    • GERD (gastroesophageal reflux disease)    • Hypertension    • IBS (irritable bowel syndrome)    • Low back pain      Past Surgical History:   Procedure Laterality Date   • ABDOMINAL SURGERY      • BRONCHOSCOPY N/A 4/18/2023    Procedure: BRONCHOSCOPY WITH BRONCHIOALVEOLAR LAVAGE/WASHINGS;  Surgeon: Cabrera Obando MD;  Location: [facility] ENDOSCOPY;  Service: Pulmonary;  Laterality: N/A;  MUCOUS PLUGGING   • COLONOSCOPY     • ENDOSCOPY N/A 4/5/2023    Procedure: ESOPHAGOGASTRODUODENOSCOPY WITH BIOPSIES;  Surgeon: Shaina Pastrana MD;  Location: [facility] ENDOSCOPY;  Service: Gastroenterology;  Laterality: N/A;  GASTRITIS  HIATAL HERNIA   • GALLBLADDER SURGERY     • HYSTERECTOMY     • VASCULAR SURGERY       SPEECH PATHOLOGY DYSPHAGIA TREATMENT    Subjective/Behavioral Observations: Cortrak in place.  Patient opens eyes with cues, answer simple questions.        Day/time of Treatment: 4/24/2023        Current Diet: Tube feeding          Treatment received: Remain focused on further assessment of swallow function and trials of p.o. intake.        Results of treatment: Wound care provided.  Laryngeal sounds clear prior to treatment.  Single ice chips via spoon.  Anterior chewing.  Delayed swallows completed.  Laryngeal sounds and vocal quality remaining clear.  Sips of water via spoon with delayed swallow completed.  Wet vocal quality and wet throat clearing observed.  Nectar thick liquid via spoon with delayed swallows completed.  Laryngeal sounds clear.  Patient agreeable to purée trials x2.  Holding in oral cavity.  Lingual pumping.  Delayed swallows completed.  Double swallow observed.  Laryngeal sounds clear to cervical auscultation.        Progress toward goals: Progressing, patient tolerating beginning trials of purée solids and nectar thick liquids via spoon however does require consistent cues for participation.        Barriers to Achieving goals: Medical status, motivation        Plan of care:/changes in plan: Continue tube feeding as primary nutrition.  Trials of p.o. intake with SLP only at this time.  May have single ice chips with nursing.                SLP Recommendation and Plan                                                                                        EDUCATION  The patient has been educated in the following areas:   Dysphagia (Swallowing Impairment).              Time Calculation:    Time Calculation- SLP     Row Name 04/24/23 1113             Time Calculation- SLP    SLP Stop Time 1100  -SN      SLP Received On 04/24/23  -SN         Untimed Charges    90440-HX Treatment Swallow Minutes 45  -SN         Total Minutes    Untimed Charges Total Minutes 45  -SN       Total Minutes 45  -SN            User Key  (r) = Recorded By, (t) = Taken By, (c) = Cosigned By    Initials Name Provider Type    SN Haylie Meeks MS-CCC/SLP, PRIMO Speech and Language Pathologist                Therapy Charges for Today     Code Description Service Date Service Provider Modifiers Qty    [id number] HC ST TREATMENT SWALLOW 3 4/24/2023 Haylie Meeks MS-CCC/SLP, PRIMO GN 1               TARIK Orourke/PRIMO VEGA  4/24/2023

## 2023-04-24 NOTE — PROGRESS NOTES
[facility]   Hospitalist Progress Note  Date: 2023  Patient Name: Bessy Leggett  : 1950  MRN: [id number]  Date of admission: 2023      Subjective   Subjective     Chief Complaint: Follow up for shortness of breath     Summary:  72 y/o F with dementia, COPD not on home oxygen, GERD, chronic low back pain, type II DM, anxiety depression who presented with 4-day history of confusion, shortness of breath and cough.  She had recent hospital admission for GI bleed and COVID.  On presentation she was hypoxic on room air with tachycardia and tachypnea.  proBNP of 1199, WBC 12.24 and lactate 3.4.  CT chest with contrast revealed no evidence of pulmonary embolus, small left pleural effusion, consolidation in left lower lobe and marked emphysematous changes.  Already on nebs, broad-spectrum antibiotics.  Pulmonology consulted.  Underwent bronchoscopy  which showed mucous plugging.  Cultures NGTD, cytology negative.  On cefdinir to complete 7-day course.  Persistent confusion, delirium, psychiatry and neurology evaluated.  MRI with severe white matter changes, microhemorrhages, amyloid angiopathy, likely vascular dementia.  Could not pass swallow eval, core track placed and tolerating tube feeds. Palliative care consulted .    Interval Followup: No overnight events.  Afebrile.  On room air.  Mentation better this morning.  Alert, smiling, took a few sips with speech therapy but did not consistently follow prompts.  Is complaining of some generalized aches and pains but nothing localized.  Denies trouble breathing.    Review of Systems  Constitutional: + Fatigue  HEENT:  + Dysphagia  Cardiovascular:  No Chest Pain, No Edema, No Palpitations  Respiratory:  No Cough, No Dyspnea,  Gastrointestinal:  No Nausea, No Vomiting  Neuro:  + Generalized weakness, + intermittent confusion      Objective   Objective     Vitals:   Temp:  [97.2 °F (36.2 °C)-98.4 °F (36.9 °C)] 97.3 °F (36.3 °C)  Heart Rate:   [] 98  Resp:  [18-22] 22  BP: (120-157)/() 141/73  Physical Exam    Constitutional: Elderly, frail, chronically ill, awake, conversant   Eyes: Pupils equal and reactive, no conjunctival injections   HENT: NCAT, Cortrak tube, MMM   Respiratory: Diminished breath sounds with rhonchi b/l,  nonlabored respirations    Cardiovascular: RRR, no murmurs, no edema   Gastrointestinal: Positive bowel sounds, soft, nondistended    Neurologic: A&Ox1, CN grossly intact, bilateral upper extremity tremors and fasciculations noted in the lower extremities.   Skin: Extremities warm, no rashes or wounds    Result Review    Result Review:  I have personally reviewed the following over the last 24 hours (07:00 to 07:00) and agree with the following findings  [x]  Laboratory   CBC        4/21/2023    06:02 4/22/2023    04:32 4/23/2023    07:38   CBC   WBC 7.89   12.74   8.20     RBC 4.69   5.29   4.64     Hemoglobin 14.5   16.4   14.5     Hematocrit 42.3   48.7   43.9     MCV 90.2   92.1   94.6     MCH 30.9   31.0   31.3     MCHC 34.3   33.7   33.0     RDW 12.8   13.0   13.1     Platelets 213   252   203        BMP        4/21/2023    06:02 4/22/2023    04:32 4/23/2023    07:38   BMP   BUN 13   15   21     Creatinine 0.68   0.78   0.70     Sodium 139   134   133     Potassium 3.9   4.5   4.4     Chloride 103   99   99     CO2 25.2   25.6   26.3     Calcium 9.5   9.9   9.1         [x]  Microbiology  Resp cx normal jeannette  BAL cultures negative  []  Radiology  []  EKG/Telemetry   []  Cardiology/Vascular   [x]  Pathology BAL cytology  []  Old records  [x]  Other:     Intake/Output Summary (Last 24 hours) at 4/24/2023 1311  Last data filed at 4/24/2023 0345  Gross per 24 hour   Intake 584 ml   Output 1150 ml   Net -566 ml           Assessment & Plan   Assessment / Plan     Assessment:  · Acute hypoxic respiratory failure  · HCAP versus postobstructive pneumonia left lower lobe from unspecified organism  · Mucous plugging versus  endobronchial lesion left lower lobe  · Elevated troponin likely type II MI secondary to demand ischemia from above  · Acute COPD exacerbation  · Ongoing tobacco use of cigarettes  · Left vocal cord polyp  · Acute hyperactive delirium  · Vascular dementia with behavioral discharge  · History of CVA  · Insomnia  · Unspecified anxiety disorder  · Sepsis, POA   · Lactic acidosis, POA, clinically significant -resolved    Plan:     · Mentation improved today.  Still having tremors but not as severe. Check CK. Continue keppra 250 mg q12 hours.  Seizure precautions  · Did a little bit better with speech therapy but still not cleared for diet.  Okay for ice chips otherwise keep n.p.o. Continue tube feeds and free water flushes 25 cc q4 hours.  · Continue bowel regimen and PPI  · BP controlled.  Continue lisinopril 20 mg day   · On room air.  Monitor SpO2, keep >90%. Continue Brovana/Pulmicort nebs twice daily. Continue Yupelri daily  · All cultures remain negative.  Afebrile without leukocytosis.  Complete course of cefdinir today  · Pulmonology following, appreciate assistance  · Continue aspirin and statin  · Continue delirium precautions and Haldol as needed.  Avoid benzodiazepines   · Lovenox 30 mg daily for DVT prophylaxis  · Palliative care consulted, follow-up discussion, would benefit from family meeting later this week  · Continue PT/OT.  · A.M. labs     Discussed plan with RN and daughter at bedside.     CODE STATUS:   Level Of Support Discussed With: Health Care Surrogate; Next of Kin (If No Surrogate)  Code Status (Patient has no pulse and is not breathing): CPR (Attempt to Resuscitate)  Medical Interventions (Patient has pulse or is breathing): Full Support    Electronically signed by Loki Iqbal DO, 04/23/23, 2:07 PM EDT.

## 2023-04-25 LAB
ANION GAP SERPL CALCULATED.3IONS-SCNC: 6.8 MMOL/L (ref 5–15)
BUN SERPL-MCNC: 19 MG/DL (ref 8–23)
BUN/CREAT SERPL: 29.7 (ref 7–25)
CALCIUM SPEC-SCNC: 9 MG/DL (ref 8.6–10.5)
CHLORIDE SERPL-SCNC: 101 MMOL/L (ref 98–107)
CO2 SERPL-SCNC: 28.2 MMOL/L (ref 22–29)
CREAT SERPL-MCNC: 0.64 MG/DL (ref 0.57–1)
DEPRECATED RDW RBC AUTO: 44.3 FL (ref 37–54)
EGFRCR SERPLBLD CKD-EPI 2021: 94 ML/MIN/1.73
ERYTHROCYTE [DISTWIDTH] IN BLOOD BY AUTOMATED COUNT: 12.9 % (ref 12.3–15.4)
GLUCOSE BLDC GLUCOMTR-MCNC: 132 MG/DL (ref 70–99)
GLUCOSE SERPL-MCNC: 130 MG/DL (ref 65–99)
HCT VFR BLD AUTO: 42 % (ref 34–46.6)
HGB BLD-MCNC: 14 G/DL (ref 12–15.9)
MCH RBC QN AUTO: 31.3 PG (ref 26.6–33)
MCHC RBC AUTO-ENTMCNC: 33.3 G/DL (ref 31.5–35.7)
MCV RBC AUTO: 94 FL (ref 79–97)
MYCOBACTERIUM SPEC CULT: NORMAL
MYCOBACTERIUM SPEC CULT: NORMAL
NIGHT BLUE STAIN TISS: NORMAL
PLATELET # BLD AUTO: 190 10*3/MM3 (ref 140–450)
PMV BLD AUTO: 10 FL (ref 6–12)
POTASSIUM SERPL-SCNC: 4.3 MMOL/L (ref 3.5–5.2)
RBC # BLD AUTO: 4.47 10*6/MM3 (ref 3.77–5.28)
SODIUM SERPL-SCNC: 136 MMOL/L (ref 136–145)
WBC NRBC COR # BLD: 7.76 10*3/MM3 (ref 3.4–10.8)

## 2023-04-25 PROCEDURE — 25010000002 LEVETRIRACETAM PER 10 MG: Performed by: INTERNAL MEDICINE

## 2023-04-25 PROCEDURE — 94760 N-INVAS EAR/PLS OXIMETRY 1: CPT

## 2023-04-25 PROCEDURE — 63710000001 REVEFENACIN 175 MCG/3ML SOLUTION: Performed by: INTERNAL MEDICINE

## 2023-04-25 PROCEDURE — 94799 UNLISTED PULMONARY SVC/PX: CPT

## 2023-04-25 PROCEDURE — 94664 DEMO&/EVAL PT USE INHALER: CPT

## 2023-04-25 PROCEDURE — 92526 ORAL FUNCTION THERAPY: CPT

## 2023-04-25 PROCEDURE — 94761 N-INVAS EAR/PLS OXIMETRY MLT: CPT

## 2023-04-25 PROCEDURE — 25010000002 ENOXAPARIN PER 10 MG: Performed by: INTERNAL MEDICINE

## 2023-04-25 PROCEDURE — 94668 MNPJ CHEST WALL SBSQ: CPT

## 2023-04-25 PROCEDURE — 99233 SBSQ HOSP IP/OBS HIGH 50: CPT | Performed by: INTERNAL MEDICINE

## 2023-04-25 PROCEDURE — 82962 GLUCOSE BLOOD TEST: CPT

## 2023-04-25 PROCEDURE — 80048 BASIC METABOLIC PNL TOTAL CA: CPT | Performed by: INTERNAL MEDICINE

## 2023-04-25 PROCEDURE — 99232 SBSQ HOSP IP/OBS MODERATE 35: CPT | Performed by: STUDENT IN AN ORGANIZED HEALTH CARE EDUCATION/TRAINING PROGRAM

## 2023-04-25 PROCEDURE — 85027 COMPLETE CBC AUTOMATED: CPT | Performed by: INTERNAL MEDICINE

## 2023-04-25 RX ORDER — LIDOCAINE 50 MG/G
1 PATCH TOPICAL DAILY PRN
Status: DISCONTINUED | OUTPATIENT
Start: 2023-04-25 | End: 2023-04-26 | Stop reason: HOSPADM

## 2023-04-25 RX ORDER — QUETIAPINE FUMARATE 25 MG/1
12.5 TABLET, FILM COATED ORAL ONCE
Status: COMPLETED | OUTPATIENT
Start: 2023-04-25 | End: 2023-04-25

## 2023-04-25 RX ADMIN — QUETIAPINE FUMARATE 12.5 MG: 25 TABLET ORAL at 21:22

## 2023-04-25 RX ADMIN — ARFORMOTEROL TARTRATE 15 MCG: 15 SOLUTION RESPIRATORY (INHALATION) at 06:52

## 2023-04-25 RX ADMIN — LEVETIRACETAM 250 MG: 100 INJECTION, SOLUTION INTRAVENOUS at 21:19

## 2023-04-25 RX ADMIN — BUDESONIDE 0.5 MG: 0.5 SUSPENSION RESPIRATORY (INHALATION) at 18:04

## 2023-04-25 RX ADMIN — REVEFENACIN 175 MCG: 175 SOLUTION RESPIRATORY (INHALATION) at 06:52

## 2023-04-25 RX ADMIN — ACETAMINOPHEN 650 MG: 325 TABLET ORAL at 19:24

## 2023-04-25 RX ADMIN — PRAVASTATIN SODIUM 40 MG: 40 TABLET ORAL at 21:25

## 2023-04-25 RX ADMIN — LEVETIRACETAM 250 MG: 100 INJECTION, SOLUTION INTRAVENOUS at 09:15

## 2023-04-25 RX ADMIN — Medication 10 ML: at 09:18

## 2023-04-25 RX ADMIN — NICOTINE 7 MG/24 HR DAILY TRANSDERMAL PATCH 1 PATCH: at 21:19

## 2023-04-25 RX ADMIN — LISINOPRIL 20 MG: 20 TABLET ORAL at 09:15

## 2023-04-25 RX ADMIN — ACETAMINOPHEN 650 MG: 325 TABLET ORAL at 06:17

## 2023-04-25 RX ADMIN — PANTOPRAZOLE SODIUM 40 MG: 40 INJECTION, POWDER, FOR SOLUTION INTRAVENOUS at 06:17

## 2023-04-25 RX ADMIN — DICLOFENAC SODIUM 2 G: 10 GEL TOPICAL at 19:25

## 2023-04-25 RX ADMIN — ARFORMOTEROL TARTRATE 15 MCG: 15 SOLUTION RESPIRATORY (INHALATION) at 18:04

## 2023-04-25 RX ADMIN — ACETAMINOPHEN 650 MG: 325 TABLET ORAL at 00:38

## 2023-04-25 RX ADMIN — Medication 10 ML: at 21:22

## 2023-04-25 RX ADMIN — BUDESONIDE 0.5 MG: 0.5 SUSPENSION RESPIRATORY (INHALATION) at 06:52

## 2023-04-25 RX ADMIN — ENOXAPARIN SODIUM 30 MG: 100 INJECTION SUBCUTANEOUS at 09:15

## 2023-04-25 NOTE — PROGRESS NOTES
Pulmonary / Critical Care Progress Note      Patient Name: Bessy Leggett  : 1950  MRN: [id number]  Attending:  Carlos Duffy MD  Date of admission: 2023    Subjective   Subjective   Follow-up for pneumonia      underwent bronchoscopy for dense mucous plugging, incidental finding of small left vocal cord polyp, BAL cultures NGTD, cytology negative.    No acute events overnight.    This morning,  Resting comfortably in bed on room air  Family at bedside  Remains confused  Weak cough  No dyspnea  Remains weak and fatigued  Meeting with palliative care today  No fever    Review of Systems  General: Fatigued, confused, otherwise denied complaints  HEENT: Denied complaints  Respiratory: Weak cough, otherwise denied complaints  Cardiovascular: Denied complaints  GI: Denied complaints  : Denied complaints  MSK: Weak, otherwise denied complaints    Objective   Objective     Vitals:   Temp:  [97.2 °F (36.2 °C)-98.4 °F (36.9 °C)] 98 °F (36.7 °C)  Heart Rate:  [91-99] 94  Resp:  [14-20] 18  BP: (129-155)/(66-84) 139/71  Flow (L/min):  [0] 0    Physical Exam   Vital Signs Reviewed   General:  NAD, elderly female, lying in bed  HEENT:  PERRL, EOMI.    Neck:  No JVD, no thyromegaly  Lymph: no axillary, cervical, supraclavicular lymphadenopathy noted bilaterally  Chest:  Clear to auscultation bilaterally, no work of breathing noted on room air  CV: RRR, no M/G/R, pulses 2+  Abd:  Soft, NT, ND, +BS  EXT:  no clubbing, no cyanosis, no edema  Neuro:  A&Ox3, CN grossly intact, no focal deficits.  Skin: No rashes or lesions noted    Result Review    Result Review:  I have personally reviewed the results from the time of this admission to 2023 12:39 EDT and agree with these findings:  [x]  Laboratory  [x]  Microbiology  [x]  Radiology  []  EKG/Telemetry   []  Cardiology/Vascular   []  Pathology  []  Old records  []  Other:  Most notable findings include:      BAL cultures - NGTD, cytology negative  Pneumonia  panel negative  S pneumo/Legionella negative  MRSA PCR negative        Lab 04/25/23  0520 04/24/23  0512 04/23/23  0738 04/22/23  0432 04/21/23  0602 04/20/23  0448   WBC 7.76  --  8.20 12.74* 7.89 6.89   HEMOGLOBIN 14.0  --  14.5 16.4* 14.5 12.5   HEMATOCRIT 42.0  --  43.9 48.7* 42.3 37.0   PLATELETS 190  --  203 252 213 177   SODIUM 136  --  133* 134* 139 140   POTASSIUM 4.3  --  4.4 4.5 3.9 3.5   CHLORIDE 101  --  99 99 103 106   CO2 28.2  --  26.3 25.6 25.2 27.1   BUN 19  --  21 15 13 17   CREATININE 0.64  --  0.70 0.78 0.68 0.74   GLUCOSE 130*  --  160* 160* 92 113*   CALCIUM 9.0  --  9.1 9.9 9.5 8.9   PHOSPHORUS  --  3.1  --  2.8  --   --        CT Chest With Contrast Diagnostic    Result Date: 4/16/2023  PROCEDURE: CT CHEST W CONTRAST DIAGNOSTIC  COMPARISON:  [facility], CR, XR CHEST 1 VW, 12/17/2021, 3:38.  [facility], CR, XR CHEST 1 VW, 5/01/2022, 13:07.  [facility], CR, XR CHEST 1 VW, 4/16/2023, 13:29.  [facility], CT, CHEST W/O CONTRAST, 11/09/2011, 14:11.  [facility], CT, CT ABDOMEN PELVIS W CONTRAST, 4/04/2023, 18:53. INDICATIONS: Shortness of breath and tachycardia  TECHNIQUE: After obtaining the patient's consent, CT images were obtained with non-ionic intravenous contrast material.   PROTOCOL:   Pulmonary embolism imaging protocol performed    RADIATION:   DLP: 244.4mGy*cm   Automated exposure control was utilized to minimize radiation dose. CONTRAST: 68cc Isovue 370 I.V. LABS:   eGFR: >60ml/min/1.73m2  FINDINGS:  No pulmonary embolism is identified.  A small left pleural effusion is noted.  A small pericardial effusion is evident.  No adenopathy is identified.  Consolidation is noted in the left lower lobe.  There is a prominent bulla noted in the medial aspect of the left lower lobe.  Marked emphysematous changes are noted.  There is an incompletely imaged fusiform abdominal aortic aneurysm measuring at least 5.1 cm AP.  A  bifurcated stent graft has been placed.  A cholecystectomy has been performed.  In the right hepatic lobe on image 267 is a low-density mass consistent with a cyst.  In the left hepatic lobe at the same level is a 0.8 cm hypodense focus, suspected to represent a cyst but too small for definitive characterization.  There is moderate to severe atrophy of the left kidney.  There are suspected bilateral renal cysts.  No focal osseous lesion is seen.      Impression:   1. No evidence of pulmonary embolism 2. Small left pleural and pericardial effusions 3. Consolidation in the left lower lobe may represent atelectasis or pneumonia. 4. Abdominal aortic aneurysm, incompletely imaged on this examination 5. Previous cholecystectomy 6. Moderate to severe atrophy of the left kidney 7. Marked emphysematous changes     Jermaine Biggs M.D.       Electronically Signed and Approved By: Jermaine Biggs M.D. on 4/16/2023 at 17:43             Assessment & Plan   Assessment / Plan     Active Hospital Problems:  Active Hospital Problems    Diagnosis    • **Acute respiratory failure with hypoxia    • Vascular dementia with behavior disturbance    • Severe Malnutrition (HCC)    • Pneumonia due to infectious organism    • Mucus plugging of bronchi    • COPD (chronic obstructive pulmonary disease)      Impression:  Acute hypoxic respiratory failure  Healthcare associat nosocomial pneumonia versus postobstructive pneumonia left lower lobe from unspecified organism  Mucous plugging versus endobronchial lesion left lower lobe  Question aspiration and airways  Lactic acidosis  Sepsis  Acute COPD exacerbation  Chronic dementia  Ongoing tobacco use of cigarettes  Left vocal cord polyp  Acute hyperactive delirium hypokalemia    Plan:  -Currently on room air, continue O2 if needed to maintain SPO2 greater than 90%  -Continue cefdinir 300 mg twice daily for 7-day total treatment  -BAL cultures NGTD, cytology negative  -Continue Brocamrona Pulmicort,  Yupelri and albuterol nebulizers  -Continue bronchopulmonary hygiene  -Continue chest physiotherapy  -Encourage I-S/flutter valve  -SLP on board, appreciate assistance  -PT/OT on board, appreciate assistance  -Encourage mobilization, out of bed to chair  -Palliative care on board, appreciate assistance, planned meeting today at 1330  -May benefit from inpatient rehab if not palliative  -Small left vocal cord polyp incidentally noted on bronchoscopy, recommend outpatient ENT follow-up upon discharge  -Unless otherwise needed, pulmonary will sign off at this time. Please call with any questions or concerns.    DVT prophylaxis:  Medical DVT prophylaxis orders are present.    CODE STATUS:   Level Of Support Discussed With: Health Care Surrogate; Next of Kin (If No Surrogate)  Code Status (Patient has no pulse and is not breathing): CPR (Attempt to Resuscitate)  Medical Interventions (Patient has pulse or is breathing): Full Support    This patient was seen by both a physician and a NP. IBarry MD, spent >50% of time in accordance with split shared billing. This included personally reviewing all pertinent labs, imaging, microbiology and documentation. Also discussing the case with the patient and any available family, the admitting physician and any available ancillary staff.     Electronically signed by Barry Vazquez MD, 04/25/23, 1:41 PM EDT.

## 2023-04-25 NOTE — THERAPY TREATMENT NOTE
Acute Care - Speech Language Pathology   Swallow Treatment Note ANNA Bernabe     Patient Name: Bessy Leggett  : 1950  MRN: [id number]  Today's Date: 2023               Admit Date: 2023    Visit Dx:     ICD-10-CM ICD-9-CM   1. COPD exacerbation  J44.1 491.21   2. Acute respiratory failure with hypoxia  J96.01 518.81   3. Pneumonia due to infectious organism, unspecified laterality, unspecified part of lung  J18.9 486   4. Mucus plugging of bronchi  T17.500A 519.19   5. Difficulty walking  R26.2 719.7   6. Decreased activities of daily living (ADL)  Z78.9 V49.89   7. Oropharyngeal dysphagia  R13.12 787.22     Patient Active Problem List   Diagnosis   • AAA (abdominal aortic aneurysm) without rupture   • Hypertension   • Anxiety   • Asthma   • Cataract   • COPD (chronic obstructive pulmonary disease)   • Depression   • Non-insulin dependent type 2 diabetes mellitus   • Family history of blood clots   • GERD (gastroesophageal reflux disease)   • History of AAA (abdominal aortic aneurysm) repair   • History of seizure   • Hypercholesterolemia   • IBS (irritable bowel syndrome)   • Low back pain   • Melena   • Epigastric pain   • Intestinal metaplasia of stomach   • Acute respiratory failure with hypoxia   • Severe Malnutrition (HCC)   • Pneumonia due to infectious organism   • Mucus plugging of bronchi   • Vascular dementia with behavior disturbance     Past Medical History:   Diagnosis Date   • Anxiety 2018    PATIENT REPORTS A LONG HISTORY OF ANXIETY ALONG IWTH AGOURA PHOBIA. SHE HAS BEEN PREVIOUSLY BEEN SEEN BY PSYCH RECENTLY., HER XANAX WAS STOPPED. I WILL REFER THE PATIENT FOR EVALUATION BY MENTAL HEALTH SPECIALIST   • Asthma    • Cataract    • COPD (chronic obstructive pulmonary disease)    • Depression    • GERD (gastroesophageal reflux disease)    • Hypertension    • IBS (irritable bowel syndrome)    • Low back pain      Past Surgical History:   Procedure Laterality Date   • ABDOMINAL SURGERY      • BRONCHOSCOPY N/A 4/18/2023    Procedure: BRONCHOSCOPY WITH BRONCHIOALVEOLAR LAVAGE/WASHINGS;  Surgeon: Cabrera Obando MD;  Location: [facility] ENDOSCOPY;  Service: Pulmonary;  Laterality: N/A;  MUCOUS PLUGGING   • COLONOSCOPY     • ENDOSCOPY N/A 4/5/2023    Procedure: ESOPHAGOGASTRODUODENOSCOPY WITH BIOPSIES;  Surgeon: Shaina Pastrana MD;  Location: [facility] ENDOSCOPY;  Service: Gastroenterology;  Laterality: N/A;  GASTRITIS  HIATAL HERNIA   • GALLBLADDER SURGERY     • HYSTERECTOMY     • VASCULAR SURGERY       SPEECH PATHOLOGY DYSPHAGIA TREATMENT    Subjective/Behavioral Observations: Cortrak in place.  Patient awake and alert.  Patient asking for coffee.    Current Diet: Tube feeding, Ice chips with supervision      Treatment received: Patient seen at bedside.  Patient agreeable to therapeutic trials of purée and nectar thick liquids.  Patient tolerated without clinical sign or symptom of aspiration.  Appears to have good laryngeal elevation per palpation.  Vocal quality clear.  Therapeutic trials of thin liquids from spoon and cup, increased laryngeal congestion noted.  Throat clearing noted intermittently with cup trials.  Refusal behaviors after limited trials presented.  Education completed with patient's daughters and palliative care nurse..    Results of treatment: Progressing    Barriers to Achieving goals: Medical status, motivation    Plan of care:/changes in plan: Initiate purée diet-spoonfed nectar thick liquids.  90 degrees upright for all intake, monitor for signs of fatigue.  Would suggest keeping core track in place.  Possibly adjusting tube feeding rate or holding tube feeds.  Doubt patient will sustain adequate intake to meet nutrition/hydration needs.    EDUCATION  The patient has been educated in the following areas:   Dysphagia (Swallowing Impairment).            Pat Clifford, SLP  4/25/2023

## 2023-04-25 NOTE — NURSING NOTE
Palliative care met with two of the 4 daughters at the tables in the lobby on [address]. I allowed them to state what they know about their mothers condition. Speech evaluated again during My visit and stated the patient has not been cleared for thin liquids and the patient was upset as she wanted her coffee. Speech also reports they do not feel the patient would eat enough even when she does get cleared to eat. I discussed this with the family and educated on the difference between [facility] and [facility] at home. The daughters feel going to rehab would cause the patient to decline more but do want to give her a chance to get stronger with home health. The family reports a decline over the last 5 years with a greater decline over the last four months. The family also expressed the patient does not want to outlive her daughter that is stage four cancer. I recommended [facility] to the family and they are agreeable to an EOS for [facility] so they will have a back up if the patient continues to decline once home. A [facility] referral was also made in the event the patient improves once home and does well with HH. Emotional support provided. Provider updated.    -Katy EPPS, RN, [facility]   Palliative Care    4/25/2023 15:13 EDT

## 2023-04-26 ENCOUNTER — READMISSION MANAGEMENT (OUTPATIENT)
Dept: CALL CENTER | Facility: HOSPITAL | Age: 73
End: 2023-04-26
Payer: MEDICARE

## 2023-04-26 VITALS
WEIGHT: 108.91 LBS | HEART RATE: 96 BPM | SYSTOLIC BLOOD PRESSURE: 140 MMHG | DIASTOLIC BLOOD PRESSURE: 85 MMHG | RESPIRATION RATE: 14 BRPM | TEMPERATURE: 97.5 F | HEIGHT: 62 IN | BODY MASS INDEX: 20.04 KG/M2 | OXYGEN SATURATION: 96 %

## 2023-04-26 LAB
FUNGUS WND CULT: ABNORMAL
FUNGUS WND CULT: ABNORMAL

## 2023-04-26 PROCEDURE — 25010000002 LEVETRIRACETAM PER 10 MG: Performed by: INTERNAL MEDICINE

## 2023-04-26 PROCEDURE — 94664 DEMO&/EVAL PT USE INHALER: CPT

## 2023-04-26 PROCEDURE — 94799 UNLISTED PULMONARY SVC/PX: CPT

## 2023-04-26 PROCEDURE — 25010000002 ENOXAPARIN PER 10 MG: Performed by: INTERNAL MEDICINE

## 2023-04-26 PROCEDURE — 99239 HOSP IP/OBS DSCHRG MGMT >30: CPT | Performed by: INTERNAL MEDICINE

## 2023-04-26 PROCEDURE — 94668 MNPJ CHEST WALL SBSQ: CPT

## 2023-04-26 PROCEDURE — 94761 N-INVAS EAR/PLS OXIMETRY MLT: CPT

## 2023-04-26 PROCEDURE — 97530 THERAPEUTIC ACTIVITIES: CPT

## 2023-04-26 PROCEDURE — 97110 THERAPEUTIC EXERCISES: CPT

## 2023-04-26 PROCEDURE — 92526 ORAL FUNCTION THERAPY: CPT

## 2023-04-26 PROCEDURE — 63710000001 REVEFENACIN 175 MCG/3ML SOLUTION: Performed by: INTERNAL MEDICINE

## 2023-04-26 RX ORDER — ASPIRIN 81 MG/1
81 TABLET ORAL DAILY
Qty: 90 TABLET | Refills: 0 | Status: SHIPPED | OUTPATIENT
Start: 2023-04-26 | End: 2023-07-25

## 2023-04-26 RX ORDER — LEVETIRACETAM 250 MG/1
250 TABLET ORAL 2 TIMES DAILY
Qty: 60 TABLET | Refills: 0 | Status: SHIPPED | OUTPATIENT
Start: 2023-04-26 | End: 2023-05-26

## 2023-04-26 RX ORDER — MIRTAZAPINE 7.5 MG/1
7.5 TABLET, FILM COATED ORAL NIGHTLY PRN
Qty: 30 TABLET | Refills: 0 | Status: SHIPPED | OUTPATIENT
Start: 2023-04-26

## 2023-04-26 RX ORDER — ALPRAZOLAM 0.25 MG/1
0.25 TABLET ORAL ONCE
Status: DISCONTINUED | OUTPATIENT
Start: 2023-04-26 | End: 2023-04-26

## 2023-04-26 RX ORDER — LISINOPRIL 20 MG/1
10 TABLET ORAL DAILY
Qty: 30 TABLET | Refills: 0 | Status: SHIPPED | OUTPATIENT
Start: 2023-04-26

## 2023-04-26 RX ORDER — ALPRAZOLAM 0.25 MG/1
0.25 TABLET ORAL ONCE
Status: COMPLETED | OUTPATIENT
Start: 2023-04-26 | End: 2023-04-26

## 2023-04-26 RX ADMIN — BUDESONIDE 0.5 MG: 0.5 SUSPENSION RESPIRATORY (INHALATION) at 06:14

## 2023-04-26 RX ADMIN — SENNOSIDES AND DOCUSATE SODIUM 2 TABLET: 8.6; 5 TABLET ORAL at 08:52

## 2023-04-26 RX ADMIN — LEVETIRACETAM 250 MG: 100 INJECTION, SOLUTION INTRAVENOUS at 08:52

## 2023-04-26 RX ADMIN — Medication 5 MG: at 00:07

## 2023-04-26 RX ADMIN — REVEFENACIN 175 MCG: 175 SOLUTION RESPIRATORY (INHALATION) at 06:14

## 2023-04-26 RX ADMIN — ACETAMINOPHEN 650 MG: 325 TABLET ORAL at 05:51

## 2023-04-26 RX ADMIN — Medication 10 ML: at 08:52

## 2023-04-26 RX ADMIN — ACETAMINOPHEN 650 MG: 325 TABLET ORAL at 00:07

## 2023-04-26 RX ADMIN — ENOXAPARIN SODIUM 30 MG: 100 INJECTION SUBCUTANEOUS at 08:50

## 2023-04-26 RX ADMIN — ALPRAZOLAM 0.25 MG: 0.25 TABLET ORAL at 00:20

## 2023-04-26 RX ADMIN — PANTOPRAZOLE SODIUM 40 MG: 40 INJECTION, POWDER, FOR SOLUTION INTRAVENOUS at 05:52

## 2023-04-26 RX ADMIN — ARFORMOTEROL TARTRATE 15 MCG: 15 SOLUTION RESPIRATORY (INHALATION) at 06:14

## 2023-04-26 RX ADMIN — DICLOFENAC SODIUM 2 G: 10 GEL TOPICAL at 08:52

## 2023-04-26 RX ADMIN — LISINOPRIL 20 MG: 20 TABLET ORAL at 08:52

## 2023-04-26 NOTE — SIGNIFICANT NOTE
04/26/23 1127   Plan   Final Discharge Disposition Code 06 - home with home health care   Final Note Plan is for pt to discharge home today [facility]. Pat will be meeting with Ty and Benoit today at 1:30 for education. MD has completed Emergency Affidavit for Emergency Guardianship. Affidavit has been completed and provided to Daughter-Pat. Pt is needing a hospital bed and BSC. RN ZANE has informed MD of DME needs. MD to complete orders and verbiage. Nicole has been notified and hospital bed will be delivered to residence on 4/27. Daughter has been provided update and agreeable for pt to discharge without hospital bed beign delivered. [facility] has been made aware of pt discharge.

## 2023-04-26 NOTE — CONSULTS
"Nutrition Services    Patient Name: Bessy Leggett  YOB: 1950  MRN: [id number]  Admission date: 4/16/2023      CLINICAL NUTRITION ASSESSMENT      Reason for Assessment  Follow-up protocol   H&P:    Past Medical History:   Diagnosis Date   • Anxiety 05/23/2018    PATIENT REPORTS A LONG HISTORY OF ANXIETY ALONG IWTH AGOURA PHOBIA. SHE HAS BEEN PREVIOUSLY BEEN SEEN BY PSYCH RECENTLY., HER XANAX WAS STOPPED. I WILL REFER THE PATIENT FOR EVALUATION BY MENTAL HEALTH SPECIALIST   • Asthma    • Cataract    • COPD (chronic obstructive pulmonary disease)    • Depression    • GERD (gastroesophageal reflux disease)    • Hypertension    • IBS (irritable bowel syndrome)    • Low back pain         Current Problems:   Active Hospital Problems    Diagnosis    • **Acute respiratory failure with hypoxia    • Vascular dementia with behavior disturbance    • Severe Malnutrition (HCC)    • Pneumonia due to infectious organism    • Mucus plugging of bronchi    • COPD (chronic obstructive pulmonary disease)         Nutrition/Diet History          Nutrition follow up. Patient continues to receive nutrition support at goal rate. Electrolytes WDL. Patient's family met w/ palliative yesterday afternoon and have a meeting w/ [facility] scheduled for this afternoon at 1330.     Per nursing notes, if patient requests for Cortrak to be removed family would like the tube to be removed, MD on board. Plans for patient to go home and likely have comfort feeds as she is not a PEG candidate d/t dementia.     RD will continue to monitor per protocol and follow for POC.      Anthropometrics        Current Height, Weight Height: 157.5 cm (62\")  Weight: 49.4 kg (108 lb 14.5 oz)   Current BMI Body mass index is 19.92 kg/m².       Weight Hx  Wt Readings from Last 30 Encounters:   04/16/23 1320 49.4 kg (108 lb 14.5 oz)   04/04/23 2100 49.6 kg (109 lb 5.6 oz)   04/04/23 1614 47.6 kg (105 lb)   10/21/22 1131 57.2 kg (126 lb)   05/01/22 1229 62 kg " (136 lb 11 oz)   12/17/21 0323 63.2 kg (139 lb 5.3 oz)   01/13/21 0000 59.9 kg (132 lb)   01/10/20 0000 51 kg (112 lb 6 oz)   12/17/19 0000 50.6 kg (111 lb 8 oz)   09/23/19 0000 50.4 kg (111 lb 2 oz)   07/02/19 0000 47.7 kg (105 lb 2 oz)   04/12/19 0000 47.3 kg (104 lb 6 oz)   01/29/19 0000 49.4 kg (109 lb)   12/14/18 0000 47.2 kg (104 lb)   10/23/18 0000 49.4 kg (109 lb)   10/19/18 0000 49.4 kg (109 lb)   09/17/18 0000 50.3 kg (111 lb)   07/06/18 0000 53.5 kg (118 lb)   06/07/18 0000 54.7 kg (120 lb 8 oz)   05/23/18 0000 58.3 kg (128 lb 8 oz)   03/14/18 0000 56.7 kg (125 lb)   03/05/18 0000 57.6 kg (127 lb)            Wt Change Observation -13.6% x 6 months     Estimated/Assessed Needs       Energy Requirements 30-35 kcal/kg   EST Needs (kcal/day) 9412-6265 kcal       Protein Requirements 1.2-1.5 g/kg   EST Daily Needs (g/day) 59-74 g       Fluid Requirements 1 ml/kcal    Estimated Needs (mL/day) 7836-9192 ml     Labs/Medications         Pertinent Labs Reviewed.   Results from last 7 days   Lab Units 04/25/23  0520 04/23/23  0738 04/22/23  0432   SODIUM mmol/L 136 133* 134*   POTASSIUM mmol/L 4.3 4.4 4.5   CHLORIDE mmol/L 101 99 99   CO2 mmol/L 28.2 26.3 25.6   BUN mg/dL 19 21 15   CREATININE mg/dL 0.64 0.70 0.78   CALCIUM mg/dL 9.0 9.1 9.9   GLUCOSE mg/dL 130* 160* 160*     Results from last 7 days   Lab Units 04/25/23  0520 04/24/23  0512 04/23/23  0738 04/22/23  0432   MAGNESIUM mg/dL  --  2.1  --  2.2   PHOSPHORUS mg/dL  --  3.1  --  2.8   HEMOGLOBIN g/dL 14.0  --    < > 16.4*   HEMATOCRIT % 42.0  --    < > 48.7*    < > = values in this interval not displayed.     COVID19   Date Value Ref Range Status   05/01/2022 Not Detected Not Detected - Ref. Range Final     Lab Results   Component Value Date    HGBA1C 5.30 10/21/2022         Pertinent Medications Reviewed.     Current Nutrition Orders & Evaluation of Intake       Oral Nutrition     Current PO Diet NPO Diet NPO Type: Strict NPO   Supplement Orders Placed  This Encounter      Place Feeding Tube Per [employer] System      Diet, Tube Feeding Tube Feeding Formula: Other (See Comment); Other: Nutren 1.0 Fiber; Tube Feeding Type: Continuous; Continuous Tube Feeding Start Rate (mL/hr): 25; Then Advance Rate By (mL/hr): 20; Every __ Hours: 4; To Goal Rate of (mL/hr): 65       Malnutrition Severity Assessment      Patient meets criteria for : Severe Malnutrition     Malnutrition Severity Assessment      Patient meets criteria for : Severe Malnutrition          Nutrition Diagnosis         Nutrition Dx Problem 1 Severe malnutrition related to increased nutrient needs due to catabolic disease as evidenced by inadequate energy intake., decreased appetite., unintended wt change., body composition changes., patient report., family report., report of minimal PO intake. and COPD     Nutrition Intervention         Nutren 1.0 @ 65 ml/hr  25 ml Q4H FWF     1560 calories, 62 g protein, 1445 ml fluid     Medical Nutrition Therapy/Nutrition Education          Learner     Readiness N/A  N/A     Method     Response N/A  N/A     Monitor/Evaluation        Monitor Per protocol, Pertinent labs, EN delivery/tolerance, Weight, GI status, Symptoms, POC/GOC, Swallow function     Nutrition Discharge Plan         To be determined     Electronically signed by:  Jeimy Mae RD  04/26/23 08:23 EDT

## 2023-04-26 NOTE — PLAN OF CARE
Goal Outcome Evaluation:         Pt to discharge home.  NG discontinued and removed.  IV removed.  VS WNL for pt.  Pt daughter at bedside.

## 2023-04-26 NOTE — THERAPY TREATMENT NOTE
Patient Name: Bessy Leggett  : 1950    MRN: [id number]                              Today's Date: 2023       Admit Date: 2023    Visit Dx:     ICD-10-CM ICD-9-CM   1. COPD exacerbation  J44.1 491.21   2. Acute respiratory failure with hypoxia  J96.01 518.81   3. Pneumonia due to infectious organism, unspecified laterality, unspecified part of lung  J18.9 486   4. Mucus plugging of bronchi  T17.500A 519.19   5. Difficulty walking  R26.2 719.7   6. Decreased activities of daily living (ADL)  Z78.9 V49.89   7. Oropharyngeal dysphagia  R13.12 787.22     Patient Active Problem List   Diagnosis   • AAA (abdominal aortic aneurysm) without rupture   • Hypertension   • Anxiety   • Asthma   • Cataract   • COPD (chronic obstructive pulmonary disease)   • Depression   • Non-insulin dependent type 2 diabetes mellitus   • Family history of blood clots   • GERD (gastroesophageal reflux disease)   • History of AAA (abdominal aortic aneurysm) repair   • History of seizure   • Hypercholesterolemia   • IBS (irritable bowel syndrome)   • Low back pain   • Melena   • Epigastric pain   • Intestinal metaplasia of stomach   • Acute respiratory failure with hypoxia   • Severe Malnutrition (HCC)   • Pneumonia due to infectious organism   • Mucus plugging of bronchi   • Vascular dementia with behavior disturbance     Past Medical History:   Diagnosis Date   • Anxiety 2018    PATIENT REPORTS A LONG HISTORY OF ANXIETY ALONG IWTH AGOURA PHOBIA. SHE HAS BEEN PREVIOUSLY BEEN SEEN BY PSYCH RECENTLY., HER XANAX WAS STOPPED. I WILL REFER THE PATIENT FOR EVALUATION BY MENTAL HEALTH SPECIALIST   • Asthma    • Cataract    • COPD (chronic obstructive pulmonary disease)    • Depression    • GERD (gastroesophageal reflux disease)    • Hypertension    • IBS (irritable bowel syndrome)    • Low back pain      Past Surgical History:   Procedure Laterality Date   • ABDOMINAL SURGERY     • BRONCHOSCOPY N/A 2023    Procedure:  BRONCHOSCOPY WITH BRONCHIOALVEOLAR LAVAGE/WASHINGS;  Surgeon: Cabrera Obando MD;  [facility];  Service: Pulmonary;  Laterality: N/A;  MUCOUS PLUGGING   • COLONOSCOPY     • ENDOSCOPY N/A 4/5/2023    Procedure: ESOPHAGOGASTRODUODENOSCOPY WITH BIOPSIES;  Surgeon: Shaina Pastrana MD;  Location: [facility] END[facility];  Service: Gastroenterology;  Laterality: N/A;  GASTRITIS  HIATAL HERNIA   • GALLBLADDER SURGERY     • HYSTERECTOMY     • VASCULAR SURGERY        General Information     Row Name 04/26/23 1150          OT Time and Intention    Document Type therapy note (daily note)  -PG     Mode of Treatment individual therapy;occupational therapy  -PG           User Key  (r) = Recorded By, (t) = Taken By, (c) = Cosigned By    Initials Name Provider Type    PG Rayo Gregorio, OT Occupational Therapist                 Mobility/ADL's     Row Name 04/26/23 1150          Transfers    Transfers bed-chair transfer;sit-stand transfer;stand-sit transfer  -PG     Row Name 04/26/23 1150          Bed-Chair Transfer    Bed-Chair Leon (Transfers) minimum assist (75% patient effort);2 person assist;verbal cues  -PG     Assistive Device (Bed-Chair Transfers) walker, front-wheeled  -PG     Comment, (Bed-Chair Transfer) Pt walked 25-30 feet min A x 2 rwx before going to recliner  -PG           User Key  (r) = Recorded By, (t) = Taken By, (c) = Cosigned By    Initials Name Provider Type    PG Rayo Gregorio, OT Occupational Therapist               Obj/Interventions     Row Name 04/26/23 1151          Shoulder (Therapeutic Exercise)    Shoulder (Therapeutic Exercise) strengthening exercise  -PG     Shoulder Strengthening (Therapeutic Exercise) 1 lb free weight;10 repetitions  -PG     Row Name 04/26/23 1151          Elbow/Forearm (Therapeutic Exercise)    Elbow/Forearm (Therapeutic Exercise) strengthening exercise  -PG     Elbow/Forearm Strengthening (Therapeutic Exercise) 1 lb free weight;10 repetitions  -PG      Row Name 04/26/23 1151          Motor Skills    Therapeutic Exercise shoulder;elbow/forearm  -PG           User Key  (r) = Recorded By, (t) = Taken By, (c) = Cosigned By    Initials Name Provider Type    Rayo Dodge OT Occupational Therapist               Goals/Plan    No documentation.                Clinical Impression     Row Name 04/26/23 1151          Plan of Care Review    Progress improving  -PG     Outcome Evaluation Pt now walking in room 25-30 feet with assist x 2 for safety and rolling walker.  Pt also completed 1lb wts. to improve strength and endurance for ADL independence.  Recommend continued OT services to maximize ADL (I)  -PG           User Key  (r) = Recorded By, (t) = Taken By, (c) = Cosigned By    Initials Name Provider Type    Rayo Dodge OT Occupational Therapist               Outcome Measures     Row Name 04/26/23 1153          How much help from another is currently needed...    Putting on and taking off regular lower body clothing? 2  -PG     Bathing (including washing, rinsing, and drying) 2  -PG     Toileting (which includes using toilet bed pan or urinal) 1  -PG     Putting on and taking off regular upper body clothing 2  -PG     Taking care of personal grooming (such as brushing teeth) 3  -PG     Eating meals 3  -PG     AM-PAC 6 Clicks Score (OT) 13  -PG     Row Name 04/26/23 0728          How much help from another person do you currently need...    Turning from your back to your side while in flat bed without using bedrails? 2  -AS     Moving from lying on back to sitting on the side of a flat bed without bedrails? 2  -AS     Moving to and from a bed to a chair (including a wheelchair)? 2  -AS     Standing up from a chair using your arms (e.g., wheelchair, bedside chair)? 2  -AS     Climbing 3-5 steps with a railing? 1  -AS     To walk in hospital room? 1  -AS     AM-PAC 6 Clicks Score (PT) 10  -AS     Highest level of mobility 4 --> Transferred to chair/commode  -AS      Row Name 04/26/23 1153          Functional Assessment    Outcome Measure Options AM-PAC 6 Clicks Daily Activity (OT);Optimal Instrument  -PG     Row Name 04/26/23 1153          Optimal Instrument    Optimal Instrument Optimal - 3  -PG     Bending/Stooping 4  -PG     Standing 3  -PG     Reaching 2  -PG           User Key  (r) = Recorded By, (t) = Taken By, (c) = Cosigned By    Initials Name Provider Type    PG Rayo Gregorio OT Occupational Therapist    AS Alexia Mena RN Registered Nurse                Occupational Therapy Education     Title: PT OT SLP Therapies (In Progress)     Topic: Occupational Therapy (In Progress)     Point: ADL training (In Progress)     Description:   Instruct learner(s) on proper safety adaptation and remediation techniques during self care or transfers.   Instruct in proper use of assistive devices.              Learning Progress Summary           Patient Acceptance, E,TB, NR by LF at 4/20/2023 1322   Family Acceptance, E,TB, NR by LF at 4/20/2023 1322                   Point: Home exercise program (Not Started)     Description:   Instruct learner(s) on appropriate technique for monitoring, assisting and/or progressing therapeutic exercises/activities.              Learner Progress:  Not documented in this visit.          Point: Precautions (In Progress)     Description:   Instruct learner(s) on prescribed precautions during self-care and functional transfers.              Learning Progress Summary           Patient Acceptance, E,TB, NR by LF at 4/20/2023 1322   Family Acceptance, E,TB, NR by LF at 4/20/2023 1322                   Point: Body mechanics (In Progress)     Description:   Instruct learner(s) on proper positioning and spine alignment during self-care, functional mobility activities and/or exercises.              Learning Progress Summary           Patient Acceptance, E,TB, NR by LF at 4/20/2023 1322   Family Acceptance, E,TB, NR by LF at 4/20/2023 1322                                User Key     Initials Effective Dates Name Provider Type Discipline    LF 06/16/21 -  Maranda Bowden OT Occupational Therapist OT              OT Recommendation and Plan     Plan of Care Review  Progress: improving  Outcome Evaluation: Pt now walking in room 25-30 feet with assist x 2 for safety and rolling walker.  Pt also completed 1lb wts. to improve strength and endurance for ADL independence.  Recommend continued OT services to maximize ADL (I)     Time Calculation:    Time Calculation- OT     Row Name 04/26/23 1154             Time Calculation- OT    OT Received On 04/26/23  -PG      OT Goal Re-Cert Due Date 04/29/23  -PG         Timed Charges    89413 - OT Therapeutic Exercise Minutes 10  -PG      62190 - OT Therapeutic Activity Minutes 13  -PG         Total Minutes    Timed Charges Total Minutes 23  -PG       Total Minutes 23  -PG            User Key  (r) = Recorded By, (t) = Taken By, (c) = Cosigned By    Initials Name Provider Type    PG Rayo Gregorio OT Occupational Therapist              Therapy Charges for Today     Code Description Service Date Service Provider Modifiers Qty    42477226630  OT THER PROC EA 15 MIN 4/26/2023 Rayo Gregorio OT GO 1    52330865001  OT THERAPEUTIC ACT EA 15 MIN 4/26/2023 Rayo Gregorio OT GO 1               Rayo Gregorio OT  4/26/2023

## 2023-04-26 NOTE — PROGRESS NOTES
[facility]   Hospitalist Progress Note  Date: 2023  Patient Name: Bessy Leggett  : 1950  MRN: [id number]  Date of admission: 2023      Subjective   Subjective     Chief Complaint: Follow up for shortness of breath     Summary:  74 y/o F with dementia, COPD not on home oxygen, GERD, chronic low back pain, type II DM, anxiety depression who presented with 4-day history of confusion, shortness of breath and cough.  She had recent hospital admission for GI bleed and COVID.  On presentation she was hypoxic on room air with tachycardia and tachypnea.  proBNP of 1199, WBC 12.24 and lactate 3.4.  CT chest with contrast revealed no evidence of pulmonary embolus, small left pleural effusion, consolidation in left lower lobe and marked emphysematous changes.  Already on nebs, broad-spectrum antibiotics.  Pulmonology consulted.  Underwent bronchoscopy  which showed mucous plugging.  Cultures NGTD, cytology negative.  On cefdinir to complete 7-day course.  Persistent confusion, delirium, psychiatry and neurology evaluated.  MRI with severe white matter changes, microhemorrhages, amyloid angiopathy, likely vascular dementia.  Could not pass swallow eval, core track placed and tolerating tube feeds. Palliative care consulted .    Interval Followup: Mentation slightly improved still has baseline level of confusion.  Multiple family members at bedside discussed at length about overall goals of care and current treatment and evaluation.  Patient still with NG.  They do not think that they would want feeding tube if she is not able to safely swallow.  They are going to meet with palliative care and discuss possible hospice later today as well.  Patient is asking for coffee.  Her tremors are doing better with Keppra      Objective   Objective     Vitals:   Temp:  [97.2 °F (36.2 °C)-98.4 °F (36.9 °C)] 97.6 °F (36.4 °C)  Heart Rate:  [91-98] 96  Resp:  [18-20] 18  BP: (129-155)/(66-83) 139/75  Flow (L/min):   [0] 0  Physical Exam    Constitutional: Elderly, frail, chronically ill, awake, conversant   HENT: NCAT, Cortrak tube   Respiratory: Diminished breath sounds with rhonchi b/l,  nonlabored respirations on room air   Cardiovascular: RRR, no murmurs, no edema   Gastrointestinal: Positive bowel sounds, soft, nondistended    Neurologic: A&O self, recognizes family, basic questions, mild resting tremors    Vital signs, labs, and relevant imaging reviewed      Assessment & Plan   Assessment / Plan     Assessment:  · Acute hypoxic respiratory failure  · HCAP versus postobstructive pneumonia left lower lobe from unspecified organism  · Mucous plugging versus endobronchial lesion left lower lobe  · Elevated troponin likely type II MI secondary to demand ischemia from above  · Acute COPD exacerbation  · Ongoing tobacco use of cigarettes  · Left vocal cord polyp  · Acute hyperactive delirium  · Vascular dementia with behavioral discharge  · History of CVA  · Insomnia  · Unspecified anxiety disorder  · Sepsis, POA   · Lactic acidosis, POA, clinically significant -resolved  · 1/2 ppd chronic nicotine dependence     -Discussed with speech, reevaluating today, still with notable concerns about swallowing/Diet.  Would not be a candidate for PEG tube given her dementia and also per family discussion.  Likely needs comfort feeds.  Continue NG tube feeds for now as per dietitian, discussed further goals of care with family tomorrow  -Continue delirium precautions  - Continue Keppra for tremors    *pt also 1/2 ppd smoker; place on NRT patch today  - Continue blood pressure medications as above and monitor  - Continue ambulation, wean O2 as able  -Antibiotics course completed for pneumonia monitor  - Discussed with pulmonology, appreciate assistance  - Continue aspirin and statin  - Discussed with palliative care as well, additional hospice/goals of care discussion today as well    CODE STATUS:   Level Of Support Discussed With: Health  Care Surrogate; Next of Kin (If No Surrogate)  Code Status (Patient has no pulse and is not breathing): CPR (Attempt to Resuscitate)  Medical Interventions (Patient has pulse or is breathing): Full Support    WBC   Date Value Ref Range Status   04/25/2023 7.76 3.40 - 10.80 10*3/mm3 Final     RBC   Date Value Ref Range Status   04/25/2023 4.47 3.77 - 5.28 10*6/mm3 Final     Hemoglobin   Date Value Ref Range Status   04/25/2023 14.0 12.0 - 15.9 g/dL Final     Hematocrit   Date Value Ref Range Status   04/25/2023 42.0 34.0 - 46.6 % Final     MCV   Date Value Ref Range Status   04/25/2023 94.0 79.0 - 97.0 fL Final     MCH   Date Value Ref Range Status   04/25/2023 31.3 26.6 - 33.0 pg Final     MCHC   Date Value Ref Range Status   04/25/2023 33.3 31.5 - 35.7 g/dL Final     RDW   Date Value Ref Range Status   04/25/2023 12.9 12.3 - 15.4 % Final     RDW-SD   Date Value Ref Range Status   04/25/2023 44.3 37.0 - 54.0 fl Final     MPV   Date Value Ref Range Status   04/25/2023 10.0 6.0 - 12.0 fL Final     Platelets   Date Value Ref Range Status   04/25/2023 190 140 - 450 10*3/mm3 Final     Lab Results   Component Value Date    GLUCOSE 130 (H) 04/25/2023    BUN 19 04/25/2023    CREATININE 0.64 04/25/2023    EGFR 94.0 04/25/2023    BCR 29.7 (H) 04/25/2023    K 4.3 04/25/2023    CO2 28.2 04/25/2023    CALCIUM 9.0 04/25/2023    ALBUMIN 3.4 (L) 04/17/2023    BILITOT 0.3 04/17/2023    AST 12 04/17/2023    ALT 5 04/17/2023     At least 52 min spent on pt care coordination primarily with goc / family meeting discussions

## 2023-04-26 NOTE — PROGRESS NOTES
Respiratory Therapist Broncho-Pulmonary Hygiene Progress Note      Patient Name:  Bessy Leggett  YOB: 1950    Bessy Leggett meets the qualification for Level 1 of the Bronco-Pulmonary Hygiene Protocol. This was based on my daily patient assessment and includes review of chest x-ray results, cough ability and quality, oxygenation, secretions or risk for secretion development and patient mobility.     Broncho-Pulmonary Hygiene Assessment:    Level of Movement: Actively changing positions-requires assistance  Disoriented/Follows Commands    Breath Sounds: Clear to slightly diminished    Cough: Strong, effective    Chest X-Ray: Possible signs of consolidation and/or atelectasis or clear. 4/16/23 Read: The lungs are clear bilaterally.  The cardiac and mediastinal silhouettes appear normal.  No   effusion is evident.    Sputum Productions: None or small amount of thin or watery secretions with effective cough    History and Physical: Chronic condition    SpO2 to Oxygen Need: greater than 92% on room air or  less than 3L nasal canula    Current SpO2 is: 96% on RA    Based on this information, I have completed the following interventions: Teach/Instruct patient on cough and deep breathe      Electronically signed by Yina Castelan RRT, 04/26/23, 7:55 AM EDT.

## 2023-04-26 NOTE — PLAN OF CARE
Goal Outcome Evaluation:  Plan of Care Reviewed With: patient, family        Progress: no change  Outcome Evaluation: VSS; Cortrak feeding tube remains in place with Nutren 1.0 w/ Fiber running at goal rate.  Free water flush every 4 hours (25mL each time).  Patient tolerating ice chips.  Requesting to go home.  Family met with Palliative Team to discuss goals and treatment.  Family has asked for the feeding tube to be removed tonight should the patient get uncomfortable, agitated, or start pulling at it.  Spoke with physician and he was agreeable to having tube removed should the patient and family request.  Family getting home ready for hospital bed which the patient will need setup tomorrow per request - regardless of if the patient goes Palliative or [facility].  Family to meet with [facility] nurse tomorrow at 1330.

## 2023-04-26 NOTE — THERAPY TREATMENT NOTE
Acute Care - Speech Language Pathology   Swallow Treatment Note [facility]     Patient Name: Bessy Leggett  : 1950  MRN: [id number]  Today's Date: 2023               Admit Date: 2023    Visit Dx:     ICD-10-CM ICD-9-CM   1. COPD exacerbation  J44.1 491.21   2. Acute respiratory failure with hypoxia  J96.01 518.81   3. Pneumonia due to infectious organism, unspecified laterality, unspecified part of lung  J18.9 486   4. Mucus plugging of bronchi  T17.500A 519.19   5. Difficulty walking  R26.2 719.7   6. Decreased activities of daily living (ADL)  Z78.9 V49.89   7. Oropharyngeal dysphagia  R13.12 787.22     Patient Active Problem List   Diagnosis   • AAA (abdominal aortic aneurysm) without rupture   • Hypertension   • Anxiety   • Asthma   • Cataract   • COPD (chronic obstructive pulmonary disease)   • Depression   • Non-insulin dependent type 2 diabetes mellitus   • Family history of blood clots   • GERD (gastroesophageal reflux disease)   • History of AAA (abdominal aortic aneurysm) repair   • History of seizure   • Hypercholesterolemia   • IBS (irritable bowel syndrome)   • Low back pain   • Melena   • Epigastric pain   • Intestinal metaplasia of stomach   • Acute respiratory failure with hypoxia   • Severe Malnutrition (HCC)   • Pneumonia due to infectious organism   • Mucus plugging of bronchi   • Vascular dementia with behavior disturbance     Past Medical History:   Diagnosis Date   • Anxiety 2018    PATIENT REPORTS A LONG HISTORY OF ANXIETY ALONG IWTH AGOURA PHOBIA. SHE HAS BEEN PREVIOUSLY BEEN SEEN BY PSYCH RECENTLY., HER XANAX WAS STOPPED. I WILL REFER THE PATIENT FOR EVALUATION BY MENTAL HEALTH SPECIALIST   • Asthma    • Cataract    • COPD (chronic obstructive pulmonary disease)    • Depression    • GERD (gastroesophageal reflux disease)    • Hypertension    • IBS (irritable bowel syndrome)    • Low back pain      Past Surgical History:   Procedure Laterality Date   • ABDOMINAL SURGERY      • BRONCHOSCOPY N/A 4/18/2023    Procedure: BRONCHOSCOPY WITH BRONCHIOALVEOLAR LAVAGE/WASHINGS;  Surgeon: Cabrera Obando MD;  Location: [facility] ENDOSCOPY;  Service: Pulmonary;  Laterality: N/A;  MUCOUS PLUGGING   • COLONOSCOPY     • ENDOSCOPY N/A 4/5/2023    Procedure: ESOPHAGOGASTRODUODENOSCOPY WITH BIOPSIES;  Surgeon: Shaina Pastrana MD;  Location: [facility] ENDOSCOPY;  Service: Gastroenterology;  Laterality: N/A;  GASTRITIS  HIATAL HERNIA   • GALLBLADDER SURGERY     • HYSTERECTOMY     • VASCULAR SURGERY     SPEECH PATHOLOGY DYSPHAGIA TREATMENT    Subjective/Behavioral Observations: Awake, family at bedside, hoping to go home today.        Day/time of Treatment: 4/26/2023        Current Diet: Tube feeding        Treatment received: Treatment focused on tolerance of p.o. intake for possible initiation of safe diet.        Results of treatment: Patient awake, cooperative.  Did require some encouragement for participation.  Thin and nectar thick liquids by spoon.  Swallows completed with mild delay with vocal quality laryngeal sounds clear.  Hand overhand assist for cup drink of trials of nectar thick and thin liquids.  Swallows completed with mild delay without any overt signs of aspiration.  Purée solids by spoon with patient hesitant swallow requiring encouragement.  Swallow completed.  Throat clearing observed however laryngeal sounds clear.  Soft solids with patient assisting in self administration.  Extended chewing.  Swallow completed with diffuse oral residue.  Thin liquids via straw with consistent throat clearing.  Repeat trials of thin liquids via controlled cup drink with mild delayed swallows completed.        Progress toward goals: Progressing        Barriers to Achieving goals: Medical status, possible discharge home today        Plan of care:/changes in plan: Based on results of this treatment, may initiate diet of mechanical soft solids and thin liquids no straw.  Patient is considered at  risk of aspiration due to her swallow delay and inconsistent mental status.                  SLP Recommendation and Plan                                                                                       EDUCATION  The patient has been educated in the following areas:   Dysphagia (Swallowing Impairment).              Time Calculation:    Time Calculation- SLP     Row Name 04/26/23 1401             Time Calculation- SLP    SLP Stop Time 0845  -SN      SLP Received On 04/26/23  -SN         Untimed Charges    [id number]-LQ Treatment Swallow Minutes 45  -SN         Total Minutes    Untimed Charges Total Minutes 45  -SN       Total Minutes 45  -SN            User Key  (r) = Recorded By, (t) = Taken By, (c) = Cosigned By    Initials Name Provider Type    Haylie Herrera MS-CCC/SLP, PRIMO Speech and Language Pathologist                Therapy Charges for Today     Code Description Service Date Service Provider Modifiers Qty    [id number] HC ST TREATMENT SWALLOW 3 4/26/2023 Haylie Meeks MS-CCC/SLP, CNT GN 1               TARIK Orourke/PRIMO VEGA  4/26/2023

## 2023-04-26 NOTE — OUTREACH NOTE
Prep Survey    Flowsheet Row Responses   [facility] patient discharged from? [facility]   Is LACE score < 7 ? No   Eligibility [facility]   Date of Admission 04/16/23   Date of Discharge 04/26/23   Discharge Disposition Home-Health Care Svc   Discharge diagnosis Acute COPD exacerbation,BRONCHOSCOPY WITH BRONCHIOALVEOLAR LAVAGE/WASHINGS   Does the patient have one of the following disease processes/diagnoses(primary or secondary)? COPD   Does the patient have Home health ordered? Yes   What is the Home health agency?  [facility]   Is there a DME ordered? Yes   What DME was ordered? Pt is needing a hospital bed and [facility]- [employer]   Prep survey completed? Yes          Hansa BOWER - Registered Nurse

## 2023-04-26 NOTE — NURSING NOTE
Palliative care visited the patient and daughter at the bedside. The patient was sitting up in bed, no reports of pain or nausea at this time. The daughter reports they want Home health at home and is interested in guardianship paperwork. I spoke with provider and unit SW about request.     -Katy EPPS, RN, [facility]   Palliative Care    4/26/2023 13:49 EDT

## 2023-04-26 NOTE — DISCHARGE SUMMARY
[facility]         HOSPITALIST  DISCHARGE SUMMARY    Patient Name: Bessy Leggett    : 1950    MRN: [id number]    Date of Admission: 2023  Date of Discharge:  23  Primary Care Physician: Erich Joya DO    Consults     Date and Time Order Name Status Description    2023  2:37 PM Inpatient Psychiatrist Consult Completed     2023  1:22 PM Inpatient Neurology Consult General Completed     2023  2:38 PM Inpatient Pulmonology Consult Completed     2023  2:34 PM Inpatient Cardiology Consult      2023  5:51 PM Hospitalist (on-call MD unless specified)      2023  9:36 PM Inpatient Gastroenterology Consult Completed           Final Diagnosis:  • Acute hypoxic respiratory failure  • HCAP versus postobstructive pneumonia left lower lobe from unspecified organism  • Mucous plugging versus endobronchial lesion left lower lobe  • Elevated troponin likely type II MI secondary to demand ischemia from above  • Acute COPD exacerbation  • Ongoing tobacco use of cigarettes  • Left vocal cord polyp  • Acute hyperactive delirium  • Vascular dementia with behavioral discharge  • History of CVA  • Insomnia  • Unspecified anxiety disorder  • Sepsis, POA   • Lactic acidosis, POA, clinically significant -resolved  • 1/2 ppd chronic nicotine dependence     Hospital Course     Hospital Course:74 y/o F with dementia, COPD not on home oxygen, GERD, chronic low back pain, type II DM, anxiety depression who presented with 4-day history of confusion, shortness of breath and cough.  She had recent hospital admission for GI bleed and COVID.  On presentation she was hypoxic on room air with tachycardia and tachypnea.  proBNP of 1199, WBC 12.24 and lactate 3.4.  CT chest with contrast revealed no evidence of pulmonary embolus, small left pleural effusion, consolidation in left lower lobe and marked emphysematous changes.  Already on nebs, broad-spectrum antibiotics.  Pulmonology  consulted.  Underwent bronchoscopy 4/18 which showed mucous plugging.  Cultures NGTD, cytology negative.  On cefdinir to complete 7-day course.  Persistent confusion, delirium, psychiatry and neurology evaluated.  MRI with severe white matter changes, microhemorrhages, amyloid angiopathy, likely vascular dementia.  Could not pass swallow eval, core track placed and tolerating tube feeds. Palliative care consulted 4/24.    Patient had some mentation improved after initial treatment, NG was able to be removed and diet advanced.  She is still at high risk for aspiration and precautions have been disscused with patient and family at length.      Family met with hospice and ultimately opting to go home with home health and reassess in the coming week or so based on how she does at home about whether to fully transition to hospice/comfort measures only.  Currently dnr/dni with no tube feed/g-tube or aggressive measures desired and main GOC are for quality of life over quantity.  Guardianship paperwork filled out given pt's advanced dementia.  She has great family support for at home as well.     DME for discharge:    The beneficiary has COPD which requires positioning of the body in ways not feasible with an ordinary bed. This patient also requires the head of the bed to be elevated more than 30 degrees most of the time due to COPD as well as increased risk with aspiration.     Patient also requires a bedside commode due to being confined to a single room.     Patient discharged in stable condition with close PCP and hospice follow up.     Future Appointments   Date Time Provider Department Center   5/26/2023  9:00 AM Janki Montgomery [place] [facility] [place] [facility]   7/27/2023  3:30 PM Paz Slade [place]EN[place] [facility] [place]       Return precautions and follow up discussed with multiple family as well as the patient.  Family voiced agreement and understanding of treatment plan.       CODE STATUS:  Code Status and Medical  Interventions:   Ordered at: 04/26/23 1127     Medical Intervention Limits:    NO intubation (DNI)    NO cardioversion    NO artificial nutrition     Level Of Support Discussed With:    Health Care Surrogate    Patient     Code Status (Patient has no pulse and is not breathing):    No CPR (Do Not Attempt to Resuscitate)     Medical Interventions (Patient has pulse or is breathing):    Limited Support           Day of Discharge     Vital Signs:  Temp:  [97.6 °F (36.4 °C)-98.3 °F (36.8 °C)] 98 °F (36.7 °C)  Heart Rate:  [] 86  Resp:  [16-20] 16  BP: (107-144)/(66-85) 107/66  Flow (L/min):  [0] 0    Physical Exam  Gen: awake, resting in bed, conversant thin/frail  Breathing comfortably on room air  rrr  Generalized weakness  Alert to self, recognizes family, answers basic questions appropriately, poor short term memory and inability to understand or make complex medical decisions       Discharge Details        Discharge Medications      New Medications      Instructions Start Date   aspirin 81 MG EC tablet  Replaces: aspirin 325 MG tablet   81 mg, Oral, Daily      Diclofenac Sodium 1 % gel gel  Commonly known as: VOLTAREN   2 g, Topical, 4 Times Daily PRN      levETIRAcetam 250 MG tablet  Commonly known as: Keppra   250 mg, Oral, 2 Times Daily, For tremors      mirtazapine 7.5 MG tablet  Commonly known as: REMERON   7.5 mg, Oral, Nightly PRN      nicotine 7 MG/24HR patch  Commonly known as: NICODERM CQ   1 patch, Transdermal, Every 24 Hours Scheduled   Start Date: April 27, 2023        Changes to Medications      Instructions Start Date   lisinopril 20 MG tablet  Commonly known as: PRINIVIL,ZESTRIL  What changed: how much to take   10 mg, Oral, Daily         Continue These Medications      Instructions Start Date   albuterol sulfate  (90 Base) MCG/ACT inhaler  Commonly known as: PROVENTIL HFA;VENTOLIN HFA;PROAIR HFA   2 puffs, Inhalation, Every 4 Hours PRN      Anoro Ellipta 62.5-25 MCG/ACT aerosol powder   inhaler  Generic drug: Umeclidinium-Vilanterol   INHALE 1 PUFF BY MOUTH DAILY      omeprazole 40 MG capsule  Commonly known as: priLOSEC   40 mg, Oral, Daily         Stop These Medications    aspirin 325 MG tablet  Replaced by: aspirin 81 MG EC tablet     cefdinir 300 MG capsule  Commonly known as: OMNICEF     citalopram 20 MG tablet  Commonly known as: CeleXA     pravastatin 40 MG tablet  Commonly known as: PRAVACHOL              Discharge Disposition:  Home-Health Care [facility] [place]    Diet: patient counseled on dietary changes made during hospital and plans to  advance as tolerated     Discharge Activity: advance as tolerated      Additional Instructions for the Follow-ups that You Need to Schedule     Discharge Follow-up with PCP   As directed       Currently Documented PCP:    Erich Joya DO    PCP Phone Number:    [phone]     Follow Up Details: 1 week hospital f/u               Pertinent  and/or Most Recent Results       LAB RESULTS:      Lab 04/25/23  0520 04/23/23  0738 04/22/23  0432 04/21/23  0602 04/20/23  0448   WBC 7.76 8.20 12.74* 7.89 6.89   HEMOGLOBIN 14.0 14.5 16.4* 14.5 12.5   HEMATOCRIT 42.0 43.9 48.7* 42.3 37.0   PLATELETS 190 203 252 213 177   NEUTROS ABS  --   --  9.80* 5.17 4.82   IMMATURE GRANS (ABS)  --   --  0.06* 0.04 0.04   LYMPHS ABS  --   --  1.70 1.98 1.37   MONOS ABS  --   --  1.07* 0.64 0.62   EOS ABS  --   --  0.07 0.04 0.03   MCV 94.0 94.6 92.1 90.2 93.7         Lab 04/25/23  0520 04/24/23  0512 04/23/23  0738 04/22/23  0432 04/21/23  0602 04/20/23  0448   SODIUM 136  --  133* 134* 139 140   POTASSIUM 4.3  --  4.4 4.5 3.9 3.5   CHLORIDE 101  --  99 99 103 106   CO2 28.2  --  26.3 25.6 25.2 27.1   ANION GAP 6.8  --  7.7 9.4 10.8 6.9   BUN 19  --  21 15 13 17   CREATININE 0.64  --  0.70 0.78 0.68 0.74   EGFR 94.0  --  92.0 80.8 92.7 86.1   GLUCOSE 130*  --  160* 160* 92 113*   CALCIUM 9.0  --  9.1 9.9 9.5 8.9   MAGNESIUM  --  2.1  --  2.2  --   --    PHOSPHORUS  --  3.1  --  2.8  --    --                          Brief Urine Lab Results  (Last result in the past 365 days)      Color   Clarity   Blood   Leuk Est   Nitrite   Protein   CREAT   Urine HCG        04/04/23 1736 Yellow   Cloudy   Negative   Small (1+)   Negative   Negative               Microbiology Results (last 10 days)     Procedure Component Value - Date/Time    Fungus Culture - Wash, Bronchus [[id number]]  (Abnormal) Collected: 04/18/23 1123    Lab Status: Preliminary result Specimen: Wash from Bronchus Updated: 04/26/23 0804     Fungus Culture Heavy growth (4+) Candida albicans    AFB Culture - Wash, Bronchus [[id number]] Collected: 04/18/23 1123    Lab Status: Preliminary result Specimen: Wash from Bronchus Updated: 04/25/23 1145     AFB Culture No AFB isolated at 1 week     AFB Stain No acid fast bacilli seen on direct smear      No acid fast bacilli seen on concentrated smear    Respiratory Culture - Wash, Bronchus [[id number]] Collected: 04/18/23 1123    Lab Status: Final result Specimen: Wash from Bronchus Updated: 04/20/23 1223     Respiratory Culture Light growth (2+) Normal respiratory jeannette. No S. aureus or Pseudomonas aeruginosa detected. Final report.     Gram Stain Few (2+) Fungal elements    Fungus Culture - Lavage, Lung, Left Lower Lobe [[id number]]  (Abnormal) Collected: 04/18/23 1121    Lab Status: Preliminary result Specimen: Lavage from Lung, Left Lower Lobe Updated: 04/26/23 0805     Fungus Culture Moderate growth (3+) Candida albicans    AFB Culture - Lavage, Lung, Left Lower Lobe [[id number]] Collected: 04/18/23 1121    Lab Status: Preliminary result Specimen: Lavage from Lung, Left Lower Lobe Updated: 04/25/23 1145     AFB Culture No AFB isolated at 1 week     AFB Stain No acid fast bacilli seen on direct smear      No acid fast bacilli seen on concentrated smear    BAL Culture, Quantitative - Lavage, Lung, Left Lower Lobe [[id number]] Collected: 04/18/23 1121    Lab Status: Final result Specimen: Lavage from Lung,  Left Lower Lobe Updated: 04/20/23 1313     BAL Culture 25,000 CFU/mL Normal respiratory jeannette. No S. aureus or Pseudomonas aeruginosa detected. Final report.     Gram Stain Few (2+) Fungal elements    Pneumonia Panel - Lavage, Lung, Left Lower Lobe [[id number]]  (Normal) Collected: 04/18/23 1121    Lab Status: Final result Specimen: Lavage from Lung, Left Lower Lobe Updated: 04/18/23 1303     Escherichia coli PCR Not Detected     Acinetobacter calcoaceticus-baumannii complex PCR Not Detected     Enterobacter cloacae PCR Not Detected     Klebsiella oxytoca PCR Not Detected     Klebsiella pneumoniae group PCR Not Detected     Klebsiella aerogenes PCR Not Detected     Moraxella catarrhalis PCR Not Detected     Proteus species PCR Not Detected     Pseudomonas aeroginosa PCR Not Detected     Serratia marcescens PCR Not Detected     Staphylococcus aureus PCR Not Detected     Streptococcus pyogenes PCR Not Detected     Haemophilus influenzae PCR Not Detected     Streptococcus agalactiae PCR Not Detected     Streptococcus pneumoniae PCR Not Detected     Chlamydophila pneumoniae PCR Not Detected     Legionella pneumophilia PCR Not Detected     Mycoplasma pneumo by PCR Not Detected     ADENOVIRUS, PCR Not Detected     CTX-M Gene N/A     IMP Gene N/A     KPC Gene N/A     mecA/C and MREJ Gene N/A     NDM Gene N/A     OXA-48-like Gene N/A     VIM Gene N/A     Coronavirus Not Detected     Human Metapneumovirus Not Detected     Human Rhinovirus/Enterovirus Not Detected     Influenza A PCR Not Detected     Influenza B PCR Not Detected     RSV, PCR Not Detected     Parainfluenza virus PCR Not Detected    Legionella Antigen, Urine - Urine, Urine, Clean Catch [[id number]]  (Normal) Collected: 04/17/23 1447    Lab Status: Final result Specimen: Urine, Clean Catch Updated: 04/17/23 1535     LEGIONELLA ANTIGEN, URINE Negative    S. Pneumo Ag Urine or CSF - Urine, Urine, Clean Catch [[id number]]  (Normal) Collected: 04/17/23 1447    Lab  Status: Final result Specimen: Urine, Clean Catch Updated: 04/17/23 1535     Strep Pneumo Ag Negative    MRSA Screen, PCR (Inpatient) - Swab, Nares [[id number]]  (Normal) Collected: 04/16/23 2119    Lab Status: Final result Specimen: Swab from Nares Updated: 04/17/23 0100     MRSA PCR No MRSA Detected    Narrative:      The negative predictive value of this diagnostic test is high and should only be used to consider de-escalating anti-MRSA therapy. A positive result may indicate colonization with MRSA and must be correlated clinically.    Blood Culture - Blood, Arm, Left [[id number]]  (Normal) Collected: 04/16/23 1843    Lab Status: Final result Specimen: Blood from Arm, Left Updated: 04/21/23 1900     Blood Culture No growth at 5 days    Blood Culture - Blood, Arm, Right [[id number]]  (Normal) Collected: 04/16/23 1843    Lab Status: Final result Specimen: Blood from Arm, Right Updated: 04/21/23 1900     Blood Culture No growth at 5 days    Mycoplasma Pneumoniae Antibody, IgM - Blood, [[id number]]  (Normal) Collected: 04/16/23 1338    Lab Status: Final result Specimen: Blood Updated: 04/16/23 2150     Mycoplasma pneumo IgM Negative          RADIOLOGY:    CT Chest With Contrast Diagnostic    Result Date: 4/16/2023  Impression:   1. No evidence of pulmonary embolism 2. Small left pleural and pericardial effusions 3. Consolidation in the left lower lobe may represent atelectasis or pneumonia. 4. Abdominal aortic aneurysm, incompletely imaged on this examination 5. Previous cholecystectomy 6. Moderate to severe atrophy of the left kidney 7. Marked emphysematous changes     Jermaine Biggs M.D.       Electronically Signed and Approved By: Jermaine Biggs M.D. on 4/16/2023 at 17:43             CT Abdomen Pelvis With Contrast    Result Date: 4/4/2023  Impression:   1. No acute intra-abdominal or intrapelvic process. 2. Infrarenal abdominal aortic aneurysm measuring up to 5.6 cm.  The aneurysm sac previously measured up to 5.3 cm.   An endoleak cannot be excluded.  This the patient is status post aorta bi-iliac graft repair. No evidence of contrast extravasation 3. Ancillary findings as described above.     DOMINICK ANTONY MD       Electronically Signed and Approved By: DOMINICK ANTONY MD on 4/04/2023 at 19:21             XR Chest 1 View    Result Date: 4/16/2023  Impression:   1. No acute cardiopulmonary disease.       Jermaine Biggs M.D.       Electronically Signed and Approved By: Jermaine Biggs M.D. on 4/16/2023 at 14:38                           Labs Pending at Discharge:  Pending Labs     Order Current Status    AFB Culture - Lavage, Lung, Left Lower Lobe Preliminary result    AFB Culture - Wash, Bronchus Preliminary result    Fungus Culture - Lavage, Lung, Left Lower Lobe Preliminary result    Fungus Culture - Wash, Bronchus Preliminary result            Time spent on Discharge including face to face service:  >35 minutes

## 2023-04-27 ENCOUNTER — TRANSITIONAL CARE MANAGEMENT TELEPHONE ENCOUNTER (OUTPATIENT)
Dept: CALL CENTER | Facility: HOSPITAL | Age: 73
End: 2023-04-27
Payer: MEDICARE

## 2023-04-27 NOTE — OUTREACH NOTE
Call Center TCM Note    Flowsheet Row Responses   [facility] patient discharged from? [facility]   Does the patient have one of the following disease processes/diagnoses(primary or secondary)? COPD   TCM attempt successful? Yes   Call start time 0824   Call end time 0833   Discharge diagnosis Acute COPD exacerbation,BRONCHOSCOPY WITH BRONCHIOALVEOLAR LAVAGE/WASHINGS   Person spoke with today (if not patient) and relationship Daughter- SKYLAR Lyman   Meds reviewed with patient/caregiver? Yes   Is the patient having any side effects they believe may be caused by any medication additions or changes? No   Does the patient have all medications ordered at discharge? Yes   Is the patient taking all medications as directed (includes completed medication regime)? Yes   Comments Scheduled hospital fu on 05/01 @ 5p with pcp   Does the patient have an appointment with their PCP within 7 days of discharge? No   Nursing Interventions Assisted patient with making appointment per protocol   What is the Home health agency?  [facility]   Has home health visited the patient within 72 hours of discharge? Call prior to 72 hours   Psychosocial issues? No   Did the patient receive a copy of their discharge instructions? Yes   Nursing interventions Reviewed instructions with patient   What is the patient's perception of their health status since discharge? Improving   Nursing Interventions Nurse provided patient education   Is the patient/caregiver able to teach back the hierarchy of who to call/visit for symptoms/problems? PCP, Specialist, Home health nurse, Urgent Care, ED, 911 Yes   Is the patient able to teach back COPD zones? Yes   Patient reports what zone on this call? Green Zone   Green Zone Reports doing well   Green Zone interventions: Take daily medications   TCM call completed? Yes   Wrap up additional comments Daughter Reports patient is doing okay Has good and bad times with her dementia. Hospital bed  and commode chair are set up to be delievered today. [facility] has reached out to her- they will set up a meeting time. If she does not improve or able to do well with  then Daughter will call [facility] back. no other concerns or questions noted at this time.   Call end time 0833   Would this patient benefit from a Referral to [facility] Social Work? No   Is the patient interested in additional calls from an ambulatory ?  NOTE:  applies to high risk patients requiring additional follow-up. No          Hansa Bailon RN    4/27/2023, 08:34 EDT

## 2023-05-02 LAB
MYCOBACTERIUM SPEC CULT: NORMAL
MYCOBACTERIUM SPEC CULT: NORMAL
NIGHT BLUE STAIN TISS: NORMAL

## 2023-05-05 ENCOUNTER — READMISSION MANAGEMENT (OUTPATIENT)
Dept: CALL CENTER | Facility: HOSPITAL | Age: 73
End: 2023-05-05
Payer: MEDICARE

## 2023-05-05 NOTE — OUTREACH NOTE
COPD/PN Week 2 Survey    Flowsheet Row Responses   [facility] patient discharged from? [facility]   Does the patient have one of the following disease processes/diagnoses(primary or secondary)? COPD   Week 2 attempt successful? Yes   Call start time 1557   Call end time 1601   Discharge diagnosis Acute COPD exacerbation,BRONCHOSCOPY WITH BRONCHIOALVEOLAR LAVAGE/WASHINGS   Is patient permission given to speak with other caregiver? Yes   List who call center can speak with Daughter Pat    Person spoke with today (if not patient) and relationship Daughter Pat    Meds reviewed with patient/caregiver? Yes   Is the patient taking all medications as directed (includes completed medication regime)? Yes   Does the patient have a primary care provider?  Yes   Has the patient kept scheduled appointments due by today? No   What is preventing the patient from keeping their appointments? --  [Pt canceled today's apt with PCP d/t not feeling well]   Nursing Interventions Advised to reschedule appointment   What is the Home health agency?  [facility]   Home health comments Palliative care is seeing pt currently per daughter    Pulse Ox monitoring Intermittent   Pulse Ox device source Patient   O2 Sat comments 94% RA    O2 Sat: education provided Sat levels, Monitoring frequency, When to seek care   Psychosocial issues? No   What is the patient's perception of their health status since discharge? Same  [good days and bad per daughter ]   If the patient is a current smoker, are they able to teach back resources for cessation? [id number]   Is the patient/caregiver able to teach back the hierarchy of who to call/visit for symptoms/problems? PCP, Specialist, Home health nurse, Urgent Care, ED, 911 Yes   Is the patient able to teach back COPD zones? Yes   Patient reports what zone on this call? Green Zone   Green Zone Reports doing well, Appetite is good   Week 2 call completed? Yes          Deisi H - Registered  Nurse

## 2023-05-09 LAB
MYCOBACTERIUM SPEC CULT: NORMAL
MYCOBACTERIUM SPEC CULT: NORMAL
NIGHT BLUE STAIN TISS: NORMAL
QT INTERVAL: 362 MS

## 2023-05-10 ENCOUNTER — OFFICE VISIT (OUTPATIENT)
Dept: FAMILY MEDICINE CLINIC | Facility: CLINIC | Age: 73
End: 2023-05-10
Payer: MEDICARE

## 2023-05-10 VITALS — HEIGHT: 62 IN | OXYGEN SATURATION: 97 % | TEMPERATURE: 97.5 F | HEART RATE: 84 BPM | BODY MASS INDEX: 19.92 KG/M2

## 2023-05-10 DIAGNOSIS — Z09 HOSPITAL DISCHARGE FOLLOW-UP: Primary | ICD-10-CM

## 2023-05-10 PROBLEM — J96.01 ACUTE RESPIRATORY FAILURE WITH HYPOXIA: Status: RESOLVED | Noted: 2023-04-16 | Resolved: 2023-05-10

## 2023-05-10 RX ORDER — RAMELTEON 8 MG/1
8 TABLET ORAL NIGHTLY
Qty: 30 TABLET | Refills: 0 | Status: SHIPPED | OUTPATIENT
Start: 2023-05-10

## 2023-05-10 NOTE — PROGRESS NOTES
Transitional Care Follow Up Visit    Subjective     Bessy Leggett is a 73 y.o. female who presents for a transitional care management visit.    Within 48 business hours after discharge our office contacted her via telephone to coordinate her care and needs.      I reviewed and discussed the details of that call along with the discharge summary, hospital problems, inpatient lab results, inpatient diagnostic studies, and consultation reports with Bessy.     Current outpatient and discharge medications have been reconciled for the patient.  Reviewed by: Erich Joya DO        4/26/2023     6:57 PM   Date of TCM Phone Call   [facility]   Date of Admission 4/16/2023   Date of Discharge 4/26/2023   Discharge Disposition Home-Health Care [facility] [place]     Risk for Readmission (LACE) Score: 15 (4/26/2023  6:00 AM)    History of Present Illness     Course During Hospital Stay : Patient presented to hospital with 4-day history of confusion, shortness of breath and cough.  Hypoxic on room air at presentation along with tachycardia and tachypnea.  Elevated proBNP, WBC and lactate.  CT chest revealed consolidation of left lower lobe with eventual bronchoscopy, which showed mucous plugging.  Negative culture and cytology.  Patient placed on cefdinir at discharge.  She continued to display persistent confusion & delirium - psychiatry was consulted along with neurology.  MRI concerning for vascular dementia.  Patient was unable to pass her swallow evaluation.  Family opted for removal of NG tube.  Daughter in the room reports that patient has been tolerating her diet with no significant issues.  No concerns for difficulty with swallowing or complications of poor swallowing at this time.  She has established care with hospice, who have placed the patient on alprazolam in addition to the medications she was discharged with at [facility].    Review of Systems   Constitutional:        Insomnia      *Patient's daughter is  also reporting tailbone discomfort.  Patient is very referral.  Requesting padding for tailbone.'     Objective      Physical Exam  Constitutional:       Comments: chronically ill and frail appearing    HENT:      Head: Normocephalic and atraumatic.      Right Ear: External ear normal.      Left Ear: External ear normal.      Nose: No rhinorrhea.   Eyes:      General:         Right eye: No discharge.         Left eye: No discharge.   Pulmonary:      Breath sounds: Decreased air movement present.   Abdominal:      General: There is no distension.   Skin:     General: Skin is warm and dry.   Neurological:      Comments: presents in wheelchair       Assessment & Plan      Diagnoses and all orders for this visit:    1. Hospital discharge follow-up (Primary)    Other orders  -     ramelteon (ROZEREM) 8 MG tablet; Take 1 tablet by mouth Every Night.  Dispense: 30 tablet; Refill: 0    · Discharge summary reviewed with patient.  Medications reviewed with patient and daughter.  Continue current medications as prescribed.  Trial of Rozerem for sleep.  Recommend surgical pad or a thick maxi pad for patient's coccyx.  We should expect to follow-up in 2 week.

## 2023-05-15 ENCOUNTER — READMISSION MANAGEMENT (OUTPATIENT)
Dept: CALL CENTER | Facility: HOSPITAL | Age: 73
End: 2023-05-15
Payer: MEDICARE

## 2023-05-15 NOTE — OUTREACH NOTE
COPD/PN Week 3 Survey    Flowsheet Row Responses   [facility] patient discharged from? [facility]   Does the patient have one of the following disease processes/diagnoses(primary or secondary)? COPD   Week 3 attempt successful? Yes   Call start time 1901   Call end time 1903   Discharge diagnosis Acute COPD exacerbation,BRONCHOSCOPY WITH BRONCHIOALVEOLAR LAVAGE/WASHINGS   Is patient permission given to speak with other caregiver? Yes   Person spoke with today (if not patient) and relationship Daughter Pat Jain reviewed with patient/caregiver? Yes   Is the patient having any side effects they believe may be caused by any medication additions or changes? No   Does the patient have all medications ordered at discharge? Yes   Is the patient taking all medications as directed (includes completed medication regime)? Yes   Does the patient have a primary care provider?  Yes   Does the patient have an appointment with their PCP or specialist within 7 days of discharge? Yes   Has the patient kept scheduled appointments due by today? Yes   What is the Home health agency?  [facility]   Has home health visited the patient within 72 hours of discharge? Yes   Home health comments Palliative care is seeing pt currently per daughter    Pulse Ox monitoring Intermittent   Pulse Ox device source Patient   O2 Sat comments Sats above 95%   Psychosocial issues? No   Did the patient receive a copy of their discharge instructions? Yes   Nursing interventions Reviewed instructions with patient   What is the patient's perception of their health status since discharge? Improving   Nursing Interventions Nurse provided patient education   Are the patient's immunizations up to date?  Yes   Nursing interventions Educated on importance of maintaining up to date immunizations as advised by provider   If the patient is a current smoker, are they able to teach back resources for cessation? [id number]-[id number]   Is the patient/caregiver  able to teach back the hierarchy of who to call/visit for symptoms/problems? PCP, Specialist, Home health nurse, Urgent Care, ED, 911 Yes   Additional teach back comments Down to 1 or 2 cigarettes a day they said.   Is the patient able to teach back COPD zones? Yes   Patient reports what zone on this call? Yellow Zone   Yellow Zone Reports having a bad day or a COPD flare, I have less energy for my daily activities   Week 3 call completed? Yes   Is the patient interested in additional calls from an ambulatory ?  NOTE:  applies to high risk patients requiring additional follow-up. No   Wrap up additional comments No questions at this time.          Kristen LIAO - Registered Nurse

## 2023-05-16 LAB
FUNGUS WND CULT: ABNORMAL
FUNGUS WND CULT: ABNORMAL
MYCOBACTERIUM SPEC CULT: NORMAL
MYCOBACTERIUM SPEC CULT: NORMAL
NIGHT BLUE STAIN TISS: NORMAL

## 2023-05-18 ENCOUNTER — TELEPHONE (OUTPATIENT)
Dept: FAMILY MEDICINE CLINIC | Facility: CLINIC | Age: 73
End: 2023-05-18

## 2023-05-18 NOTE — TELEPHONE ENCOUNTER
[facility]. She said patient is experiencing nasal congestion, cough and still having trouble with sleeping. She mentioned that her medication was just changed by you to help improve the sleeping problems but it is not helping her.    Please advise on what PT should do or what needs to be done.    Call back # [phone]

## 2023-05-19 NOTE — TELEPHONE ENCOUNTER
Mazin Gaona :     Regarding her respiratory sxs - I recommend supportive care at this time, unless alarming sxs like a sinus infection lasting more than 10 days, difficulty breathing, unrelenting fevers, etc.     Regarding her sleep, we can try a different sleeping medication if they want. If they are agreeable, I will send a new prescription.    Let me know if they have any questions. Thank you!

## 2023-05-19 NOTE — TELEPHONE ENCOUNTER
Spoke with pts daughter. Caretaker was confused because PT just started sleeping pill on Tuesday night. Daughter stated last night she slept like a rock and they were wanting to try it for at least a full week before changing anything but daughter seems to think it may work for her and she will keep an eye on respiratory symptoms.

## 2023-05-23 LAB
MYCOBACTERIUM SPEC CULT: NORMAL
MYCOBACTERIUM SPEC CULT: NORMAL
NIGHT BLUE STAIN TISS: NORMAL

## 2023-05-30 LAB
MYCOBACTERIUM SPEC CULT: NORMAL
MYCOBACTERIUM SPEC CULT: NORMAL
NIGHT BLUE STAIN TISS: NORMAL

## 2023-06-19 ENCOUNTER — OFFICE VISIT (OUTPATIENT)
Dept: FAMILY MEDICINE CLINIC | Facility: CLINIC | Age: 73
End: 2023-06-19
Payer: MEDICARE

## 2023-06-19 VITALS
SYSTOLIC BLOOD PRESSURE: 136 MMHG | TEMPERATURE: 97.8 F | DIASTOLIC BLOOD PRESSURE: 80 MMHG | HEART RATE: 103 BPM | OXYGEN SATURATION: 95 %

## 2023-06-19 DIAGNOSIS — J44.1 COPD EXACERBATION: Primary | ICD-10-CM

## 2023-06-19 DIAGNOSIS — R13.19 ESOPHAGEAL DYSPHAGIA: ICD-10-CM

## 2023-06-19 DIAGNOSIS — I10 PRIMARY HYPERTENSION: ICD-10-CM

## 2023-06-19 PROCEDURE — 3079F DIAST BP 80-89 MM HG: CPT | Performed by: FAMILY MEDICINE

## 2023-06-19 PROCEDURE — 3075F SYST BP GE 130 - 139MM HG: CPT | Performed by: FAMILY MEDICINE

## 2023-06-19 PROCEDURE — 99214 OFFICE O/P EST MOD 30 MIN: CPT | Performed by: FAMILY MEDICINE

## 2023-06-19 RX ORDER — PREDNISONE 20 MG/1
TABLET ORAL
Qty: 15 TABLET | Refills: 0 | Status: SHIPPED | OUTPATIENT
Start: 2023-06-19 | End: 2023-06-29

## 2023-06-19 RX ORDER — ALBUTEROL SULFATE 90 UG/1
2 AEROSOL, METERED RESPIRATORY (INHALATION) EVERY 4 HOURS PRN
Qty: 18 G | Refills: 2 | Status: SHIPPED | OUTPATIENT
Start: 2023-06-19

## 2023-06-19 RX ORDER — LISINOPRIL 20 MG/1
10 TABLET ORAL DAILY
Qty: 90 TABLET | Refills: 3 | Status: SHIPPED | OUTPATIENT
Start: 2023-06-19 | End: 2023-06-20 | Stop reason: SDUPTHER

## 2023-06-19 NOTE — PROGRESS NOTES
Chief Complaint    Cough (Home health requested a chest xray due to coughing x 1 month), Difficulty Swallowing (Started about 2 months ago), and Hypertension (Refills )    Subjective      Bessy Leggett presents to [facility]    History of Present Illness    1.) SUBACUTE COUGH : COPD'er.  History of smoking x 34 years.  Currently prescribed Anoro plus rescue inhaler.  Patient presents with a persistent cough.  Increased sputum production.  Intermittent difficulty breathing.    2.) DIFFICULTY SWALLOWING : New onset difficulty breathing.  Onset 2 months ago.  Patient reports difficulty with solids and liquids.  Admits to eating small amounts of food.  Patient eats slowly.  Admits to intermittent choking.  Prior evaluation per gastroenterology.  Hesitancy to perform EGD secondary to having to use anesthesia with patient.    3.) HTN : Controlled. Due for refill of lisinopril.    Objective     Vital Signs:     /80 (BP Location: Left arm)   Pulse 103   Temp 97.8 °F (36.6 °C)   SpO2 95%       Physical Exam  Vitals reviewed.   Constitutional:       General: She is not in acute distress.     Appearance: Normal appearance. She is well-developed.   HENT:      Head: Normocephalic and atraumatic.      Right Ear: Hearing and external ear normal.      Left Ear: Hearing and external ear normal.      Nose: Nose normal.   Eyes:      General: Lids are normal.         Right eye: No discharge.         Left eye: No discharge.      Conjunctiva/sclera: Conjunctivae normal.   Pulmonary:      Effort: Pulmonary effort is normal.      Breath sounds: Decreased breath sounds present.   Abdominal:      General: There is no distension.   Musculoskeletal:         General: No swelling.      Cervical back: Neck supple.   Skin:     Coloration: Skin is not jaundiced.      Findings: No erythema.   Neurological:      Mental Status: She is alert. Mental status is at baseline.   Psychiatric:         Mood and  Affect: Mood and affect normal.         Thought Content: Thought content normal.     Assessment and Plan     Diagnoses and all orders for this visit:    1. COPD exacerbation (Primary)  Comments:  Imaging reviewed - no suspicion for acute process - will await official radiology report. Start PO steroid course. Continue Anoro. Continue PRN JAIDEN. F/U 2 wks.  Orders:  -     XR Chest PA & Lateral (In Office)    2. Esophageal dysphagia  Comments:  Swallow study ordered as noted. Additional recs per report. Advised to eat pureed foods, slowly with sips of water.  Orders:  -     FL Video Swallow With Speech Single Contrast; Future    3. Primary hypertension  Comments:  Controlled. Continue Lisinopril, as prescribed.    Other orders  -     lisinopril (PRINIVIL,ZESTRIL) 20 MG tablet; Take 0.5 tablets by mouth Daily.  Dispense: 90 tablet; Refill: 3  -     predniSONE (DELTASONE) 20 MG tablet; Take 2 tablets by mouth Daily for 5 days, THEN 1 tablet Daily for 5 days.  Dispense: 15 tablet; Refill: 0  -     albuterol sulfate  (90 Base) MCG/ACT inhaler; Inhale 2 puffs Every 4 (Four) Hours As Needed for Wheezing.  Dispense: 18 g; Refill: 2      Follow Up     Return in about 2 weeks (around 7/3/2023).    Patient was given instructions and counseling regarding her condition or for health maintenance advice. Please see specific information pulled into the AVS if appropriate.

## 2023-07-07 ENCOUNTER — TELEPHONE (OUTPATIENT)
Dept: FAMILY MEDICINE CLINIC | Facility: CLINIC | Age: 73
End: 2023-07-07

## 2023-07-07 NOTE — TELEPHONE ENCOUNTER
Caller: SKYLAR PHELPS    Relationship to patient: Emergency Contact    Best call back number [phone]    Patient is needing: PATIENT'S DAUGHTER CALLED IN AND SAID SHE WOULD LIKE A CALL BACK REGARDING LATEST LAB RESULTS OF PATIENT PLEASE

## 2023-07-24 ENCOUNTER — PATIENT OUTREACH (OUTPATIENT)
Dept: CASE MANAGEMENT | Facility: OTHER | Age: 73
End: 2023-07-24
Payer: MEDICARE

## 2023-07-24 NOTE — OUTREACH NOTE
Social Work Assessment  Questions/Answers      Flowsheet Row Most Recent Value   Referral Source physician   Reason for Consult community resources   Preferred Language English   Advance Care Planning Reviewed questions answered   People in Home child(anthony), adult   Current Living Arrangements home   Primary Care Provided by child(anthony)   Provides Primary Care For no one, unable/limited ability to care for self   Family Caregiver if Needed child(anthony), adult   Quality of Family Relationships helpful   Source of Income disability, social security   Application for Public Assistance obtained public assistance pending number          SDOH updated and reviewed with the patient during this program:  Financial Resource Strain: Low Risk     Difficulty of Paying Living Expenses: Not very hard      Food Insecurity: No Food Insecurity    Worried About Running Out of Food in the Last Year: Never true    Ran Out of Food in the Last Year: Never true      Transportation Needs: No Transportation Needs    Lack of Transportation (Medical): No    Lack of Transportation (Non-Medical): No      Housing Stability: Low Risk     Unable to Pay for Housing in the Last Year: No    Number of Places Lived in the Last Year: 1    Unstable Housing in the Last Year: No     Care Coordination    MSW spoke with patient's daughter, Pat, to discuss resources needed for waiver supports. Pat states that she has had to stop working to care for her mother who has dementia. Patient's daughter educated on waiver services and how to apply. MSW did advise that [employer] is moving offices this week and will be unavailable, so to leave a message or call next week. MSW to send patient's daughter information via [employer]. MSW available should patient's daughter need assistance.    Tyra RAMOS -   Ambulatory Case Management    7/24/2023, 14:10 EDT

## 2023-07-25 ENCOUNTER — PATIENT OUTREACH (OUTPATIENT)
Dept: CASE MANAGEMENT | Facility: OTHER | Age: 73
End: 2023-07-25
Payer: MEDICARE

## 2023-07-25 DIAGNOSIS — E43 SEVERE MALNUTRITION: ICD-10-CM

## 2023-07-25 DIAGNOSIS — F01.518 VASCULAR DEMENTIA WITH BEHAVIOR DISTURBANCE: Primary | ICD-10-CM

## 2023-07-25 NOTE — OUTREACH NOTE
AMBULATORY CASE MANAGEMENT NOTE    Name and Relationship of Patient/Support Person: [employer] - Other    Care Coordination    Compiled paperwork needed for KATY overlay order. Faxed to DME. Will call on Friday for update.    Education Documentation  No documentation found.        DIO LIAO  Ambulatory Case Management    7/25/2023, 10:19 EDT

## 2023-07-27 ENCOUNTER — PATIENT ROUNDING (BHMG ONLY) (OUTPATIENT)
Dept: NEUROLOGY | Facility: CLINIC | Age: 73
End: 2023-07-27
Payer: MEDICARE

## 2023-07-27 ENCOUNTER — OFFICE VISIT (OUTPATIENT)
Dept: NEUROLOGY | Facility: CLINIC | Age: 73
End: 2023-07-27
Payer: MEDICARE

## 2023-07-27 VITALS
DIASTOLIC BLOOD PRESSURE: 78 MMHG | SYSTOLIC BLOOD PRESSURE: 147 MMHG | WEIGHT: 107.8 LBS | HEIGHT: 63 IN | BODY MASS INDEX: 19.1 KG/M2 | HEART RATE: 71 BPM

## 2023-07-27 DIAGNOSIS — F01.C2 SEVERE VASCULAR DEMENTIA WITH PSYCHOTIC DISTURBANCE: Primary | ICD-10-CM

## 2023-07-27 RX ORDER — UMECLIDINIUM BROMIDE AND VILANTEROL TRIFENATATE 62.5; 25 UG/1; UG/1
POWDER RESPIRATORY (INHALATION)
Qty: 60 EACH | Refills: 2 | Status: SHIPPED | OUTPATIENT
Start: 2023-07-27

## 2023-07-27 RX ORDER — ASPIRIN 81 MG/1
81 TABLET ORAL DAILY
COMMUNITY

## 2023-07-27 RX ORDER — PRAVASTATIN SODIUM 40 MG
1 TABLET ORAL DAILY
COMMUNITY
Start: 2023-06-27

## 2023-07-27 NOTE — PROGRESS NOTES
"Subjective     Bessy Leggett is seen today in consultation at the request of Dr. Moreira for hospital followup.     CC: Memory Loss      History of Present Illness   Bessy Leggett is a 73 y.o. female who comes to clinic today for evaluation of Vascular Dementia . Her family has noted symptoms since at least 5 years marked most notably by  hallucinations and delusions . This has gradually worsened  over time. Additional associated symptoms have included impairments in essentially all spheres of cognition. She has had associated  symptoms of anxiety , agitation , delusions, hallucinations, and insomnia .  Her family  notes  impairments in ADL's. Her family  manages her medications  and finances. She is no longer driving . She is currently residing at her home with her daughter living there full-time.     HPI elements (location, quality, severity, duration, timing, context, modifying factors, associated si's/sx's)    Prior evaluation has included an MRI of the brain which was notable for  significant vascular disease and significant atrophy.  She is currently taking  mirtazepine and PRN xanax from palliative care services .    I have reviewed and confirmed the past family, social and medical history as accurate on 7/27/23.     Review of Systems    Objective       /78   Pulse 71   Ht 160 cm (63\")   Wt 48.9 kg (107 lb 12.8 oz)   BMI 19.10 kg/m²     Physical Exam  HENT:      Head: Normocephalic.   Pulmonary:      Effort: Pulmonary effort is normal.   Neurological:      Mental Status: She is alert. She is disoriented.      Sensory: Sensation is intact.      Motor: Motor function is intact.      Comments: Wheelchair bound         Neurologic Exam    MoCA: 3/30      Assessment & Plan       MRI Brain:             1. No acute infarction  2. Few age indeterminate micro hemorrhage is noted in the cerebral hemispheres bilaterally.    Correlate clinically for the possibility of amyloid angiopathy, previous trauma and " bleeding diathesis.  3. Severe small vessel ischemic changes in the white matter  4. Chronic left thalamic lacunar infarct  5. Moderate diffuse cerebral atrophy, not significantly changed since the 5/1/2022 head CT.    DISCUSSION/SUMMARY    Bessy Leggett comes to clinic today for evaluation of Vascular Dementia . Her history and examination, including bedside cognitive testing are most consistent with Vascular Dementia , which was discussed. For now it was elected to obtain  labs . After discussing potential treatment options, it was elected to continue on   recommendations from palliative care  and continue on  mirtazepine . She will then follow up in 3 months , or sooner if needed.       A total of 45 minutes was spent with the patient, reviewing hospital records, reviewing imaging, assessing the patient and documenting plan.

## 2023-07-28 ENCOUNTER — APPOINTMENT (OUTPATIENT)
Dept: GENERAL RADIOLOGY | Facility: HOSPITAL | Age: 73
End: 2023-07-28
Payer: MEDICARE

## 2023-07-28 ENCOUNTER — HOSPITAL ENCOUNTER (EMERGENCY)
Facility: HOSPITAL | Age: 73
Discharge: HOME | End: 2023-07-28
Attending: EMERGENCY MEDICINE
Payer: MEDICARE

## 2023-07-28 ENCOUNTER — APPOINTMENT (OUTPATIENT)
Dept: CT IMAGING | Facility: HOSPITAL | Age: 73
End: 2023-07-28
Payer: MEDICARE

## 2023-07-28 ENCOUNTER — PATIENT OUTREACH (OUTPATIENT)
Dept: CASE MANAGEMENT | Facility: OTHER | Age: 73
End: 2023-07-28
Payer: MEDICARE

## 2023-07-28 VITALS
RESPIRATION RATE: 24 BRPM | TEMPERATURE: 98.2 F | HEART RATE: 76 BPM | HEIGHT: 63 IN | WEIGHT: 114.64 LBS | DIASTOLIC BLOOD PRESSURE: 74 MMHG | BODY MASS INDEX: 20.31 KG/M2 | OXYGEN SATURATION: 94 % | SYSTOLIC BLOOD PRESSURE: 155 MMHG
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
## 2023-07-28 LAB
ALBUMIN SERPL-MCNC: 4.1 G/DL (ref 3.5–5.2)
ALBUMIN/GLOB SERPL: 1.7 G/DL
ALP SERPL-CCNC: 76 U/L (ref 39–117)
ALT SERPL W P-5'-P-CCNC: 9 U/L (ref 1–33)
ANION GAP SERPL CALCULATED.3IONS-SCNC: 14.3 MMOL/L (ref 5–15)
AST SERPL-CCNC: 14 U/L (ref 1–32)
BASOPHILS # BLD AUTO: 0.04 10*3/MM3 (ref 0–0.2)
BASOPHILS NFR BLD AUTO: 0.6 % (ref 0–1.5)
BILIRUB SERPL-MCNC: 0.2 MG/DL (ref 0–1.2)
BILIRUB UR QL STRIP: NEGATIVE
BUN SERPL-MCNC: 17 MG/DL (ref 8–23)
BUN/CREAT SERPL: 17.7 (ref 7–25)
CALCIUM SPEC-SCNC: 10 MG/DL (ref 8.6–10.5)
CHLORIDE SERPL-SCNC: 103 MMOL/L (ref 98–107)
CLARITY UR: CLEAR
CO2 SERPL-SCNC: 22.7 MMOL/L (ref 22–29)
COLOR UR: YELLOW
CREAT SERPL-MCNC: 0.96 MG/DL (ref 0.57–1)
DEPRECATED RDW RBC AUTO: 44.8 FL (ref 37–54)
EGFRCR SERPLBLD CKD-EPI 2021: 63 ML/MIN/1.73
EOSINOPHIL # BLD AUTO: 0.2 10*3/MM3 (ref 0–0.4)
EOSINOPHIL NFR BLD AUTO: 2.9 % (ref 0.3–6.2)
ERYTHROCYTE [DISTWIDTH] IN BLOOD BY AUTOMATED COUNT: 13.2 % (ref 12.3–15.4)
GLOBULIN UR ELPH-MCNC: 2.4 GM/DL
GLUCOSE SERPL-MCNC: 106 MG/DL (ref 65–99)
GLUCOSE UR STRIP-MCNC: NEGATIVE MG/DL
HCT VFR BLD AUTO: 44.4 % (ref 34–46.6)
HGB BLD-MCNC: 14.7 G/DL (ref 12–15.9)
HGB UR QL STRIP.AUTO: NEGATIVE
HOLD SPECIMEN: NORMAL
HOLD SPECIMEN: NORMAL
IMM GRANULOCYTES # BLD AUTO: 0.02 10*3/MM3 (ref 0–0.05)
IMM GRANULOCYTES NFR BLD AUTO: 0.3 % (ref 0–0.5)
KETONES UR QL STRIP: NEGATIVE
LEUKOCYTE ESTERASE UR QL STRIP.AUTO: NEGATIVE
LYMPHOCYTES # BLD AUTO: 2.6 10*3/MM3 (ref 0.7–3.1)
LYMPHOCYTES NFR BLD AUTO: 38.1 % (ref 19.6–45.3)
MAGNESIUM SERPL-MCNC: 2 MG/DL (ref 1.6–2.4)
MCH RBC QN AUTO: 31 PG (ref 26.6–33)
MCHC RBC AUTO-ENTMCNC: 33.1 G/DL (ref 31.5–35.7)
MCV RBC AUTO: 93.7 FL (ref 79–97)
MONOCYTES # BLD AUTO: 0.6 10*3/MM3 (ref 0.1–0.9)
MONOCYTES NFR BLD AUTO: 8.8 % (ref 5–12)
NEUTROPHILS NFR BLD AUTO: 3.36 10*3/MM3 (ref 1.7–7)
NEUTROPHILS NFR BLD AUTO: 49.3 % (ref 42.7–76)
NITRITE UR QL STRIP: NEGATIVE
NRBC BLD AUTO-RTO: 0 /100 WBC (ref 0–0.2)
PH UR STRIP.AUTO: 7 [PH] (ref 5–8)
PLATELET # BLD AUTO: 193 10*3/MM3 (ref 140–450)
PMV BLD AUTO: 9.4 FL (ref 6–12)
POTASSIUM SERPL-SCNC: 4 MMOL/L (ref 3.5–5.2)
PROT SERPL-MCNC: 6.5 G/DL (ref 6–8.5)
PROT UR QL STRIP: NEGATIVE
RBC # BLD AUTO: 4.74 10*6/MM3 (ref 3.77–5.28)
SODIUM SERPL-SCNC: 140 MMOL/L (ref 136–145)
SP GR UR STRIP: 1.01 (ref 1–1.03)
TROPONIN T SERPL HS-MCNC: 14 NG/L
UROBILINOGEN UR QL STRIP: NORMAL
WBC NRBC COR # BLD: 6.82 10*3/MM3 (ref 3.4–10.8)
WHOLE BLOOD HOLD COAG: NORMAL
WHOLE BLOOD HOLD SPECIMEN: NORMAL

## 2023-07-28 PROCEDURE — 80053 COMPREHEN METABOLIC PANEL: CPT | Performed by: EMERGENCY MEDICINE

## 2023-07-28 PROCEDURE — 93010 ELECTROCARDIOGRAM REPORT: CPT | Performed by: INTERNAL MEDICINE

## 2023-07-28 PROCEDURE — 84484 ASSAY OF TROPONIN QUANT: CPT | Performed by: EMERGENCY MEDICINE

## 2023-07-28 PROCEDURE — 93005 ELECTROCARDIOGRAM TRACING: CPT | Performed by: EMERGENCY MEDICINE

## 2023-07-28 PROCEDURE — 70450 CT HEAD/BRAIN W/O DYE: CPT

## 2023-07-28 PROCEDURE — 36415 COLL VENOUS BLD VENIPUNCTURE: CPT

## 2023-07-28 PROCEDURE — 81003 URINALYSIS AUTO W/O SCOPE: CPT

## 2023-07-28 PROCEDURE — 99284 EMERGENCY DEPT VISIT MOD MDM: CPT

## 2023-07-28 PROCEDURE — 83735 ASSAY OF MAGNESIUM: CPT | Performed by: EMERGENCY MEDICINE

## 2023-07-28 PROCEDURE — 93005 ELECTROCARDIOGRAM TRACING: CPT

## 2023-07-28 PROCEDURE — 72125 CT NECK SPINE W/O DYE: CPT

## 2023-07-28 PROCEDURE — 85025 COMPLETE CBC W/AUTO DIFF WBC: CPT

## 2023-07-28 PROCEDURE — 71045 X-RAY EXAM CHEST 1 VIEW: CPT

## 2023-07-28 RX ORDER — SODIUM CHLORIDE 0.9 % (FLUSH) 0.9 %
10 SYRINGE (ML) INJECTION AS NEEDED
Status: DISCONTINUED | OUTPATIENT
Start: 2023-07-28 | End: 2023-07-28 | Stop reason: HOSPADM

## 2023-07-28 NOTE — OUTREACH NOTE
AMBULATORY CASE MANAGEMENT NOTE    Name and Relationship of Patient/Support Person: PIERRESKYLAR - Emergency Contact    Care Coordination    Spoke to Nicole regarding KATY overlay. They state this was delivered yesterday.    Patient Outreach    Spoke to patients daughter. She advises that this was delivered yesterday. They have not had time to install this yet but plan to do so today. No other needs expressed at this time. I will sign off case for now. Will leave open for social work services.    Care Coordination    Social work is actually ready to sign off on patient as well, per Tyra. I will close program.    Education Documentation  No documentation found.        DIO LIAO  Ambulatory Case Management    7/28/2023, 10:46 EDT

## 2023-07-28 NOTE — ED PROVIDER NOTES
Emergency Department Encounter    Date seen: 7/30/2023  Time: 6:49 PM EDT    Room number: 28/28    Chief Complaint: fall      HPI    History of Present Illness:  Patient is a 73 y.o. year old female who presents to the emergency department for evaluation of head pain and neck pain after falling.    Independent Historian/Clinical History and Information was obtained by:   Patient and Family    History is limited by: Dementia      PCP: Erich Joya DO        Past Medical History:     Allergies   Allergen Reactions    Penicillins Shortness Of Breath, Itching, Swelling and Rash     Has tolerated ceftriaxone, cefdinir, and cefepime -Tyrell [facility]    Buspirone Unknown - High Severity    Ibuprofen Irritability    Rofecoxib Unknown - Low Severity    Sumatriptan Unknown - High Severity    Tramadol Unknown - High Severity    Tylenol With Codeine #3 [Acetaminophen-Codeine] Unknown - High Severity     Past Medical History:   Diagnosis Date    Anxiety 05/23/2018    PATIENT REPORTS A LONG HISTORY OF ANXIETY ALONG IWTH AGOURA PHOBIA. SHE HAS BEEN PREVIOUSLY BEEN SEEN BY PSYCH RECENTLY., HER XANAX WAS STOPPED. I WILL REFER THE PATIENT FOR EVALUATION BY MENTAL HEALTH SPECIALIST    Asthma     Cataract     COPD (chronic obstructive pulmonary disease)     Depression     GERD (gastroesophageal reflux disease)     Hypertension     IBS (irritable bowel syndrome)     Low back pain      Past Surgical History:   Procedure Laterality Date    ABDOMINAL SURGERY      BRONCHOSCOPY N/A 4/18/2023    Procedure: BRONCHOSCOPY WITH BRONCHIOALVEOLAR LAVAGE/WASHINGS;  Surgeon: Cabrera Obando MD;  Location: [facility] ENDOSCOPY;  Service: Pulmonary;  Laterality: N/A;  MUCOUS PLUGGING    COLONOSCOPY      ENDOSCOPY N/A 4/5/2023    Procedure: ESOPHAGOGASTRODUODENOSCOPY WITH BIOPSIES;  Surgeon: Shaina Pastrana MD;  Location: [facility] ENDOSCOPY;  Service: Gastroenterology;  Laterality: N/A;  GASTRITIS  HIATAL HERNIA    GALLBLADDER SURGERY       HYSTERECTOMY      VASCULAR SURGERY       Family History   Problem Relation Age of Onset    Diabetes Mother     Cancer Father     Alcohol abuse Father     Cancer Sister     Cancer Brother     Stroke Other     Heart disease Other     Hypertension Other     Cancer Other     Diabetes Other        Home Medications:  Prior to Admission medications    Medication Sig Start Date End Date Taking? Authorizing Provider   albuterol sulfate  (90 Base) MCG/ACT inhaler Inhale 2 puffs Every 4 (Four) Hours As Needed for Wheezing. 6/19/23   Erich Joya DO   ALPRAZolam (XANAX) 0.25 MG tablet 1 tablet. prn 5/4/23   Sandra Berg MD   Anoro Ellipta 62.5-25 MCG/ACT aerosol powder  inhaler INHALE 1 PUFF BY MOUTH DAILY 7/27/23   Erich Joya DO   aspirin 81 MG EC tablet Take 1 tablet by mouth Daily.    Sandra Berg MD   citalopram (CeleXA) 20 MG tablet Take 1 tablet by mouth Every Morning. 6/30/23   Erich Joya DO   Diclofenac Sodium (VOLTAREN) 1 % gel gel Apply 2 g topically to the appropriate area as directed 4 (Four) Times a Day As Needed (joint pain). 4/26/23   Carlos Duffy MD   levETIRAcetam (Keppra) 250 MG tablet Take 1 tablet by mouth 2 (Two) Times a Day for 30 days. For tremors 4/26/23 5/26/23  Carlos Duffy MD   lisinopril (PRINIVIL,ZESTRIL) 20 MG tablet Take 1 tablet by mouth Daily. 6/20/23   Erich Joya DO   mirtazapine (REMERON) 7.5 MG tablet Take 1 tablet by mouth At Night As Needed (for insomnia/agitation.). 4/26/23   Carlos Duffy MD   ondansetron (ZOFRAN) 4 MG tablet  5/4/23   Sandra Berg MD   pravastatin (PRAVACHOL) 40 MG tablet Take 1 tablet by mouth Daily. 6/27/23   Sandra Berg MD   ramelteon (ROZEREM) 8 MG tablet Take 1 tablet by mouth Every Night.  Patient not taking: Reported on 7/27/2023 5/10/23   Erich Joya DO        Social History:   Social History     Tobacco Use    Smoking status: Every Day     Packs/day: 2.00     Years: 49.00     Pack years: 98.00  "    Types: Cigarettes     Start date: 1974     Passive exposure: Current    Smokeless tobacco: Never   Vaping Use    Vaping Use: Unknown   Substance Use Topics    Alcohol use: Never    Drug use: Never       All labs were reviewed and interpreted by me.  All X-rays impressions were independently interpreted by me.  CT scan radiology impression was interpreted by me.      Review of Systems:  Review of Systems   Constitutional:  Negative for chills and fever.   HENT:  Negative for congestion, ear pain and sore throat.    Eyes:  Negative for pain.   Respiratory:  Negative for cough, chest tightness and shortness of breath.    Cardiovascular:  Negative for chest pain.   Gastrointestinal:  Negative for abdominal pain, diarrhea, nausea and vomiting.   Genitourinary:  Negative for flank pain and hematuria.   Musculoskeletal:  Positive for neck pain. Negative for joint swelling.   Skin:  Negative for pallor.   Neurological:  Positive for headaches. Negative for seizures.   All other systems reviewed and are negative.     Physical Exam:  /74 (BP Location: Right arm, Patient Position: Sitting)   Pulse 76   Temp 98.2 °F (36.8 °C) (Oral)   Resp 24   Ht 160 cm (63\")   Wt 52 kg (114 lb 10.2 oz)   LMP  (LMP Unknown)   SpO2 94%   Breastfeeding No   BMI 20.31 kg/m²     Physical Exam  Vitals and nursing note reviewed.   Constitutional:       General: She is not in acute distress.     Appearance: Normal appearance. She is not ill-appearing, toxic-appearing or diaphoretic.   HENT:      Head: Normocephalic and atraumatic.      Mouth/Throat:      Mouth: Mucous membranes are moist.   Eyes:      General: No scleral icterus.  Cardiovascular:      Rate and Rhythm: Normal rate and regular rhythm.      Pulses: Normal pulses.      Heart sounds: Normal heart sounds.   Pulmonary:      Effort: Pulmonary effort is normal. No respiratory distress.      Breath sounds: Normal breath sounds. No stridor. No wheezing, rhonchi or rales. "   Chest:      Chest wall: No tenderness.   Abdominal:      General: Abdomen is flat.      Palpations: Abdomen is soft.      Tenderness: There is no abdominal tenderness.   Musculoskeletal:         General: No swelling, tenderness, deformity or signs of injury. Normal range of motion.      Cervical back: Normal range of motion and neck supple. No rigidity or tenderness.   Skin:     General: Skin is warm and dry.      Coloration: Skin is not jaundiced or pale.      Findings: No bruising, erythema, lesion or rash.   Neurological:      Mental Status: She is alert and oriented to person, place, and time. Mental status is at baseline.      Comments: At baseline, pt has dementia, daughter at bedside                Procedures:  Procedures      Medical Decision Making:      Comorbidities that affect care:    Chronic back pain, dementia, GERD, Asthma, COPD    External Notes reviewed:          The following orders were placed and all results were independently analyzed by me:  Orders Placed This Encounter   Procedures    XR Chest 1 View    CT Head Without Contrast    CT Cervical Spine Without Contrast    [place] Draw    Comprehensive Metabolic Panel    Single High Sensitivity Troponin T    Magnesium    Urinalysis With Microscopic If Indicated (No Culture) - Urine, Clean Catch    CBC Auto Differential    Undress & Gown    Continuous Pulse Oximetry    Vital Signs    ECG 12 Lead ED Triage Standing Order; Weak / Dizzy / AMS    CBC & Differential    Green Top (Gel)    Lavender Top    Gold Top - SST    Light Blue Top       Medications Given in the Emergency Department:  Medications - No data to display       ED Course:    ED Course as of 07/30/23 2110 Fri Jul 28, 2023 2058 Single High Sensitivity Troponin T(!)  Troponin slightly elevated however is lower than all her other previous visits.  Additionally patient is having no chest pain.  Patient, as well as her family, are anxious to be discharged.  Due to patient having a  slightly elevated troponin chronically, and she is having no chest pain or shortness of air, I feel comfortable discharging this patient with no further cardiac work-up. [MS]      ED Course User Index  [MS] Nicole Rocha APRN       Labs:    Lab Results (last 24 hours)       ** No results found for the last 24 hours. **             Imaging:    No Radiology Exams Resulted Within Past 24 Hours      Differential Diagnosis and Discussion:    Trauma:  Differential diagnosis considered but not limited to were subarachnoid hemorrhage, intracranial bleeding, pneumothorax, cardiac contusion, lung contusion, intra-abdominal bleeding, and compartment syndrome of any extremity or other significant traumatic pathology        Patient Care Considerations:          Consultants/Shared Management Plan:    None    Social Determinants of Health:          Disposition and Care Coordination:        I have explained discharge medications and the need for follow up with the patient/caretakers. This was also printed in the discharge instructions. Patient was discharged with the following medications and follow up:      Medication List      No changes were made to your prescriptions during this visit.      Erich Joya,   [address] [place] [address] [phone]             [facility]     Amount and/or Complexity of Data Reviewed  Decide to obtain previous medical records or to obtain history from someone other than the patient: yes         Final diagnoses:   Fall, initial encounter        ED Disposition       ED Disposition   Discharge    Condition   Stable    Comment   --               This medical record created using voice recognition software.                       Nicole Rocha APRN  07/30/23 [id number]

## 2023-07-29 LAB — QT INTERVAL: 339 MS

## 2023-07-29 NOTE — DISCHARGE INSTRUCTIONS
Please know that your head CT and neck CT were negative for any bleed, fracture, or any emergent findings that would warrant intervention.  Your chest x-ray was also within normal limits.  Please continue to take all your home meds as normal.  Return to the ER as needed

## 2023-09-22 ENCOUNTER — PATIENT MESSAGE (OUTPATIENT)
Dept: NEUROLOGY | Facility: CLINIC | Age: 73
End: 2023-09-22
Payer: MEDICARE

## 2024-01-02 RX ORDER — PRAVASTATIN SODIUM 40 MG
TABLET ORAL DAILY
Qty: 90 TABLET | OUTPATIENT
Start: 2024-01-02

## 2024-01-16 RX ORDER — CITALOPRAM 20 MG/1
20 TABLET ORAL EVERY MORNING
Qty: 90 TABLET | Refills: 1 | Status: SHIPPED | OUTPATIENT
Start: 2024-01-16
